# Patient Record
Sex: FEMALE | Race: WHITE | NOT HISPANIC OR LATINO | Employment: FULL TIME | URBAN - METROPOLITAN AREA
[De-identification: names, ages, dates, MRNs, and addresses within clinical notes are randomized per-mention and may not be internally consistent; named-entity substitution may affect disease eponyms.]

---

## 2019-10-23 LAB
EXTERNAL CHLAMYDIA RESULT: NEGATIVE
EXTERNAL HIV SCREEN: NORMAL
HCV AB SER-ACNC: <0.1
N GONORRHOEA RRNA SPEC QL PROBE: NEGATIVE

## 2020-06-30 ENCOUNTER — APPOINTMENT (OUTPATIENT)
Dept: LAB | Facility: CLINIC | Age: 45
End: 2020-06-30
Payer: COMMERCIAL

## 2020-06-30 ENCOUNTER — OFFICE VISIT (OUTPATIENT)
Dept: FAMILY MEDICINE CLINIC | Facility: CLINIC | Age: 45
End: 2020-06-30
Payer: COMMERCIAL

## 2020-06-30 VITALS
WEIGHT: 185 LBS | OXYGEN SATURATION: 99 % | SYSTOLIC BLOOD PRESSURE: 102 MMHG | DIASTOLIC BLOOD PRESSURE: 72 MMHG | TEMPERATURE: 97.9 F | BODY MASS INDEX: 32.78 KG/M2 | HEART RATE: 49 BPM | HEIGHT: 63 IN

## 2020-06-30 DIAGNOSIS — E03.9 HYPOTHYROIDISM, UNSPECIFIED TYPE: ICD-10-CM

## 2020-06-30 DIAGNOSIS — I42.9 CARDIOMYOPATHY, UNSPECIFIED TYPE (HCC): ICD-10-CM

## 2020-06-30 DIAGNOSIS — J45.20 MILD INTERMITTENT ASTHMA, UNSPECIFIED WHETHER COMPLICATED: ICD-10-CM

## 2020-06-30 DIAGNOSIS — D50.9 IRON DEFICIENCY ANEMIA, UNSPECIFIED IRON DEFICIENCY ANEMIA TYPE: ICD-10-CM

## 2020-06-30 DIAGNOSIS — Z00.00 PREVENTATIVE HEALTH CARE: Primary | ICD-10-CM

## 2020-06-30 DIAGNOSIS — T78.40XA ALLERGIC STATE, INITIAL ENCOUNTER: ICD-10-CM

## 2020-06-30 DIAGNOSIS — Z79.899 HIGH RISK MEDICATION USE: ICD-10-CM

## 2020-06-30 DIAGNOSIS — Z12.31 VISIT FOR SCREENING MAMMOGRAM: ICD-10-CM

## 2020-06-30 DIAGNOSIS — Z13.220 SCREENING, LIPID: ICD-10-CM

## 2020-06-30 DIAGNOSIS — Z00.00 ANNUAL PHYSICAL EXAM: ICD-10-CM

## 2020-06-30 LAB
ALBUMIN SERPL BCP-MCNC: 3.5 G/DL (ref 3.5–5)
ALP SERPL-CCNC: 95 U/L (ref 46–116)
ALT SERPL W P-5'-P-CCNC: 27 U/L (ref 12–78)
ANION GAP SERPL CALCULATED.3IONS-SCNC: 8 MMOL/L (ref 4–13)
AST SERPL W P-5'-P-CCNC: 20 U/L (ref 5–45)
BASOPHILS # BLD AUTO: 0.04 THOUSANDS/ΜL (ref 0–0.1)
BASOPHILS NFR BLD AUTO: 1 % (ref 0–1)
BILIRUB SERPL-MCNC: 0.74 MG/DL (ref 0.2–1)
BUN SERPL-MCNC: 11 MG/DL (ref 5–25)
CALCIUM SERPL-MCNC: 9.1 MG/DL (ref 8.3–10.1)
CHLORIDE SERPL-SCNC: 106 MMOL/L (ref 100–108)
CHOLEST SERPL-MCNC: 155 MG/DL (ref 50–200)
CO2 SERPL-SCNC: 24 MMOL/L (ref 21–32)
CREAT SERPL-MCNC: 0.81 MG/DL (ref 0.6–1.3)
EOSINOPHIL # BLD AUTO: 0.3 THOUSAND/ΜL (ref 0–0.61)
EOSINOPHIL NFR BLD AUTO: 5 % (ref 0–6)
ERYTHROCYTE [DISTWIDTH] IN BLOOD BY AUTOMATED COUNT: 12.9 % (ref 11.6–15.1)
GFR SERPL CREATININE-BSD FRML MDRD: 87 ML/MIN/1.73SQ M
GLUCOSE P FAST SERPL-MCNC: 90 MG/DL (ref 65–99)
HCT VFR BLD AUTO: 40.5 % (ref 34.8–46.1)
HDLC SERPL-MCNC: 52 MG/DL
HGB BLD-MCNC: 12.7 G/DL (ref 11.5–15.4)
IMM GRANULOCYTES # BLD AUTO: 0.01 THOUSAND/UL (ref 0–0.2)
IMM GRANULOCYTES NFR BLD AUTO: 0 % (ref 0–2)
LDLC SERPL CALC-MCNC: 90 MG/DL (ref 0–100)
LYMPHOCYTES # BLD AUTO: 2.59 THOUSANDS/ΜL (ref 0.6–4.47)
LYMPHOCYTES NFR BLD AUTO: 41 % (ref 14–44)
MCH RBC QN AUTO: 29.5 PG (ref 26.8–34.3)
MCHC RBC AUTO-ENTMCNC: 31.4 G/DL (ref 31.4–37.4)
MCV RBC AUTO: 94 FL (ref 82–98)
MONOCYTES # BLD AUTO: 0.64 THOUSAND/ΜL (ref 0.17–1.22)
MONOCYTES NFR BLD AUTO: 10 % (ref 4–12)
NEUTROPHILS # BLD AUTO: 2.82 THOUSANDS/ΜL (ref 1.85–7.62)
NEUTS SEG NFR BLD AUTO: 43 % (ref 43–75)
NONHDLC SERPL-MCNC: 103 MG/DL
NRBC BLD AUTO-RTO: 0 /100 WBCS
PLATELET # BLD AUTO: 205 THOUSANDS/UL (ref 149–390)
PMV BLD AUTO: 12.7 FL (ref 8.9–12.7)
POTASSIUM SERPL-SCNC: 4.2 MMOL/L (ref 3.5–5.3)
PROT SERPL-MCNC: 7.5 G/DL (ref 6.4–8.2)
RBC # BLD AUTO: 4.31 MILLION/UL (ref 3.81–5.12)
SODIUM SERPL-SCNC: 138 MMOL/L (ref 136–145)
TRIGL SERPL-MCNC: 67 MG/DL
TSH SERPL DL<=0.05 MIU/L-ACNC: 2.33 UIU/ML (ref 0.36–3.74)
WBC # BLD AUTO: 6.4 THOUSAND/UL (ref 4.31–10.16)

## 2020-06-30 PROCEDURE — 84443 ASSAY THYROID STIM HORMONE: CPT

## 2020-06-30 PROCEDURE — 80053 COMPREHEN METABOLIC PANEL: CPT

## 2020-06-30 PROCEDURE — 36415 COLL VENOUS BLD VENIPUNCTURE: CPT

## 2020-06-30 PROCEDURE — 99386 PREV VISIT NEW AGE 40-64: CPT | Performed by: FAMILY MEDICINE

## 2020-06-30 PROCEDURE — 80061 LIPID PANEL: CPT

## 2020-06-30 PROCEDURE — 85025 COMPLETE CBC W/AUTO DIFF WBC: CPT

## 2020-06-30 RX ORDER — ALBUTEROL SULFATE 90 UG/1
2 AEROSOL, METERED RESPIRATORY (INHALATION) 4 TIMES DAILY PRN
COMMUNITY
Start: 2007-01-08 | End: 2021-12-21 | Stop reason: SDUPTHER

## 2020-06-30 RX ORDER — LORAZEPAM 1 MG/1
TABLET ORAL
COMMUNITY
Start: 2020-06-09 | End: 2022-03-18 | Stop reason: SDUPTHER

## 2020-06-30 RX ORDER — MONTELUKAST SODIUM 10 MG/1
TABLET ORAL DAILY
COMMUNITY
Start: 2007-03-21 | End: 2020-07-16 | Stop reason: SDUPTHER

## 2020-06-30 RX ORDER — LISINOPRIL 10 MG/1
10 TABLET ORAL DAILY
COMMUNITY

## 2020-06-30 RX ORDER — LIOTHYRONINE SODIUM 5 UG/1
5 TABLET ORAL DAILY
COMMUNITY
End: 2020-07-16 | Stop reason: SDUPTHER

## 2020-06-30 RX ORDER — LANOLIN ALCOHOL/MO/W.PET/CERES
CREAM (GRAM) TOPICAL DAILY
COMMUNITY

## 2020-06-30 RX ORDER — BUSPIRONE HYDROCHLORIDE 10 MG/1
10 TABLET ORAL 3 TIMES DAILY
COMMUNITY
End: 2020-07-16 | Stop reason: SDUPTHER

## 2020-06-30 RX ORDER — ESCITALOPRAM OXALATE 20 MG/1
20 TABLET ORAL DAILY
COMMUNITY
End: 2020-07-16 | Stop reason: SDUPTHER

## 2020-06-30 RX ORDER — LEVOTHYROXINE SODIUM 175 UG/1
175 TABLET ORAL DAILY
COMMUNITY
End: 2020-07-16 | Stop reason: SDUPTHER

## 2020-06-30 RX ORDER — LORATADINE 10 MG/1
10 TABLET ORAL DAILY
COMMUNITY
End: 2020-07-16 | Stop reason: SDUPTHER

## 2020-07-16 ENCOUNTER — OFFICE VISIT (OUTPATIENT)
Dept: FAMILY MEDICINE CLINIC | Facility: CLINIC | Age: 45
End: 2020-07-16
Payer: COMMERCIAL

## 2020-07-16 VITALS
HEART RATE: 55 BPM | HEIGHT: 64 IN | SYSTOLIC BLOOD PRESSURE: 120 MMHG | BODY MASS INDEX: 31.58 KG/M2 | TEMPERATURE: 98.4 F | OXYGEN SATURATION: 95 % | DIASTOLIC BLOOD PRESSURE: 78 MMHG | WEIGHT: 185 LBS

## 2020-07-16 DIAGNOSIS — F41.9 ANXIETY: ICD-10-CM

## 2020-07-16 DIAGNOSIS — Z00.00 PREVENTATIVE HEALTH CARE: ICD-10-CM

## 2020-07-16 DIAGNOSIS — D50.9 IRON DEFICIENCY ANEMIA, UNSPECIFIED IRON DEFICIENCY ANEMIA TYPE: ICD-10-CM

## 2020-07-16 DIAGNOSIS — I10 ESSENTIAL HYPERTENSION: ICD-10-CM

## 2020-07-16 DIAGNOSIS — E03.9 HYPOTHYROIDISM, UNSPECIFIED TYPE: Primary | ICD-10-CM

## 2020-07-16 DIAGNOSIS — I42.9 CARDIOMYOPATHY, UNSPECIFIED TYPE (HCC): ICD-10-CM

## 2020-07-16 DIAGNOSIS — T78.40XA ALLERGIC STATE, INITIAL ENCOUNTER: ICD-10-CM

## 2020-07-16 PROBLEM — A08.4 VIRAL GASTROENTERITIS: Status: ACTIVE | Noted: 2020-07-16

## 2020-07-16 PROCEDURE — 3074F SYST BP LT 130 MM HG: CPT | Performed by: FAMILY MEDICINE

## 2020-07-16 PROCEDURE — 99215 OFFICE O/P EST HI 40 MIN: CPT | Performed by: FAMILY MEDICINE

## 2020-07-16 PROCEDURE — 1036F TOBACCO NON-USER: CPT | Performed by: FAMILY MEDICINE

## 2020-07-16 PROCEDURE — 3078F DIAST BP <80 MM HG: CPT | Performed by: FAMILY MEDICINE

## 2020-07-16 PROCEDURE — 3008F BODY MASS INDEX DOCD: CPT | Performed by: FAMILY MEDICINE

## 2020-07-16 RX ORDER — LORATADINE 10 MG/1
10 TABLET ORAL DAILY
Qty: 90 TABLET | Refills: 1 | Status: SHIPPED | OUTPATIENT
Start: 2020-07-16 | End: 2022-03-18 | Stop reason: SDUPTHER

## 2020-07-16 RX ORDER — BUSPIRONE HYDROCHLORIDE 10 MG/1
10 TABLET ORAL 2 TIMES DAILY
Qty: 180 TABLET | Refills: 0 | Status: SHIPPED | OUTPATIENT
Start: 2020-07-16 | End: 2021-12-21

## 2020-07-16 RX ORDER — ESCITALOPRAM OXALATE 20 MG/1
20 TABLET ORAL DAILY
Qty: 90 TABLET | Refills: 0 | Status: SHIPPED | OUTPATIENT
Start: 2020-07-16 | End: 2021-12-21

## 2020-07-16 RX ORDER — MONTELUKAST SODIUM 10 MG/1
10 TABLET ORAL DAILY
Qty: 90 TABLET | Refills: 1 | Status: SHIPPED | OUTPATIENT
Start: 2020-07-16 | End: 2022-03-18 | Stop reason: SDUPTHER

## 2020-07-16 RX ORDER — LEVOTHYROXINE SODIUM 175 UG/1
175 TABLET ORAL DAILY
Qty: 90 TABLET | Refills: 1 | Status: SHIPPED | OUTPATIENT
Start: 2020-07-16 | End: 2022-03-18 | Stop reason: SDUPTHER

## 2020-07-16 RX ORDER — LIOTHYRONINE SODIUM 5 UG/1
5 TABLET ORAL DAILY
Qty: 90 TABLET | Refills: 1 | Status: SHIPPED | OUTPATIENT
Start: 2020-07-16 | End: 2022-03-18 | Stop reason: SDUPTHER

## 2020-07-16 NOTE — PROGRESS NOTES
Subjective:      Patient ID: Bladimir Wheat is a 53 y o  female  Thyroid Problem   Presents for follow-up visit  Patient reports no anxiety, depressed mood, fatigue, menstrual problem, palpitations or tremors  The symptoms have been stable (Levothyroxine 175 micrograms, Cytomel 5 micrograms)  Hypertension   This is a chronic problem  The current episode started more than 1 year ago  The problem is controlled  Associated symptoms include anxiety  Pertinent negatives include no chest pain, headaches, palpitations, peripheral edema or shortness of breath  Risk factors: Cardiomyopathy  Treatments tried: Lisinopril 10 milligram, metoprolol 12 5 milligram b i d  The current treatment provides significant improvement  There are no compliance problems  Identifiable causes of hypertension include a thyroid problem  Anxiety   Presents for follow-up visit  Patient reports no chest pain, decreased concentration, depressed mood, excessive worry, insomnia, irritability, nervous/anxious behavior, palpitations, restlessness, shortness of breath or suicidal ideas  Panic: Improving  Symptoms occur occasionally  The severity of symptoms is causing significant distress  Compliance with medications: Lexapro 20 milligram, BuSpar 10 milligram b i d  Treatment side effects: None  Patient has history of cardiomyopathy, follows up with cardiologist   Brad Church for seasonal allergies  Requesting medication refill  Has history of intermittent uncomplicated asthma  Uses albuterol only as needed    Past Medical History:   Diagnosis Date    Allergic     Anxiety     Asthma     Cardiomyopathy ([address] )     Depression     Disease of thyroid gland     Interstitial cystitis        Family History   Problem Relation Age of Onset    Asthma Father     Arthritis Father     Cancer Mother     Cervical cancer Mother     Arthritis Mother        Past Surgical History:   Procedure Laterality Date    BARIATRIC SURGERY       SECTION      EAR RECONSTRUCTION      HYSTERECTOMY          reports that she has never smoked  She has never used smokeless tobacco  She reports that she drinks alcohol  She reports that she has current or past drug history  Drug: Marijuana  Current Outpatient Medications:     albuterol (Proventil HFA) 90 mcg/act inhaler, Inhale 2 puffs 4 (four) times a day as needed, Disp: , Rfl:     busPIRone (BUSPAR) 10 mg tablet, Take 1 tablet (10 mg total) by mouth 2 (two) times a day, Disp: 180 tablet, Rfl: 0    escitalopram (LEXAPRO) 20 mg tablet, Take 1 tablet (20 mg total) by mouth daily, Disp: 90 tablet, Rfl: 0    levothyroxine 175 mcg tablet, Take 1 tablet (175 mcg total) by mouth daily Needs to be name brand, Disp: 90 tablet, Rfl: 1    liothyronine (CYTOMEL) 5 mcg tablet, Take 1 tablet (5 mcg total) by mouth daily, Disp: 90 tablet, Rfl: 1    lisinopril (ZESTRIL) 10 mg tablet, Take 10 mg by mouth daily, Disp: , Rfl:     loratadine (CLARITIN) 10 mg tablet, Take 1 tablet (10 mg total) by mouth daily, Disp: 90 tablet, Rfl: 1    LORazepam (ATIVAN) 1 mg tablet, TK 1 T PO QD PRA, Disp: , Rfl:     metoprolol tartrate (LOPRESSOR) 25 mg tablet, Take 0 5 tablets (12 5 mg total) by mouth every 12 (twelve) hours, Disp: 30 tablet, Rfl: 0    montelukast (Singulair) 10 mg tablet, Take 1 tablet (10 mg total) by mouth daily, Disp: 90 tablet, Rfl: 1    vitamin B-12 (VITAMIN B-12) 1,000 mcg tablet, Take by mouth daily, Disp: , Rfl:     The following portions of the patient's history were reviewed and updated as appropriate: allergies, current medications, past family history, past medical history, past social history, past surgical history and problem list     Review of Systems   Constitutional: Negative  Negative for fatigue and irritability  Eyes: Negative  Respiratory: Negative  Negative for shortness of breath  Cardiovascular: Negative  Negative for chest pain and palpitations     Gastrointestinal: Negative  Endocrine: Negative  Genitourinary: Negative  Negative for menstrual problem  Musculoskeletal: Negative  Negative for myalgias  Neurological: Negative  Negative for tremors and headaches  Psychiatric/Behavioral: Negative  Negative for decreased concentration and suicidal ideas  The patient is not nervous/anxious and does not have insomnia  Objective:    /78   Pulse 55   Temp 98 4 °F (36 9 °C)   Ht 5' 4" (1 626 m)   Wt 83 9 kg (185 lb)   SpO2 95%   BMI 31 76 kg/m²      Physical Exam   Constitutional: She is oriented to person, place, and time  She appears well-developed and well-nourished  Cardiovascular: Normal rate, regular rhythm and normal heart sounds  Pulmonary/Chest: Effort normal and breath sounds normal    Abdominal: Soft  Bowel sounds are normal    Neurological: She is alert and oriented to person, place, and time     Psychiatric: Her behavior is normal  Judgment normal          Recent Results (from the past 1008 hour(s))   Comprehensive metabolic panel    Collection Time: 06/30/20 10:01 AM   Result Value Ref Range    Sodium 138 136 - 145 mmol/L    Potassium 4 2 3 5 - 5 3 mmol/L    Chloride 106 100 - 108 mmol/L    CO2 24 21 - 32 mmol/L    ANION GAP 8 4 - 13 mmol/L    BUN 11 5 - 25 mg/dL    Creatinine 0 81 0 60 - 1 30 mg/dL    Glucose, Fasting 90 65 - 99 mg/dL    Calcium 9 1 8 3 - 10 1 mg/dL    AST 20 5 - 45 U/L    ALT 27 12 - 78 U/L    Alkaline Phosphatase 95 46 - 116 U/L    Total Protein 7 5 6 4 - 8 2 g/dL    Albumin 3 5 3 5 - 5 0 g/dL    Total Bilirubin 0 74 0 20 - 1 00 mg/dL    eGFR 87 ml/min/1 73sq m   Lipid panel    Collection Time: 06/30/20 10:01 AM   Result Value Ref Range    Cholesterol 155 50 - 200 mg/dL    Triglycerides 67 <=150 mg/dL    HDL, Direct 52 >=40 mg/dL    LDL Calculated 90 0 - 100 mg/dL    Non-HDL-Chol (CHOL-HDL) 103 mg/dl   TSH, 3rd generation with Free T4 reflex    Collection Time: 06/30/20 10:01 AM   Result Value Ref Range    TSH 3RD GENERATON 2 330 0 358 - 3 740 uIU/mL   CBC and differential    Collection Time: 06/30/20 10:01 AM   Result Value Ref Range    WBC 6 40 4 31 - 10 16 Thousand/uL    RBC 4 31 3 81 - 5 12 Million/uL    Hemoglobin 12 7 11 5 - 15 4 g/dL    Hematocrit 40 5 34 8 - 46 1 %    MCV 94 82 - 98 fL    MCH 29 5 26 8 - 34 3 pg    MCHC 31 4 31 4 - 37 4 g/dL    RDW 12 9 11 6 - 15 1 %    MPV 12 7 8 9 - 12 7 fL    Platelets 065 457 - 986 Thousands/uL    nRBC 0 /100 WBCs    Neutrophils Relative 43 43 - 75 %    Immat GRANS % 0 0 - 2 %    Lymphocytes Relative 41 14 - 44 %    Monocytes Relative 10 4 - 12 %    Eosinophils Relative 5 0 - 6 %    Basophils Relative 1 0 - 1 %    Neutrophils Absolute 2 82 1 85 - 7 62 Thousands/µL    Immature Grans Absolute 0 01 0 00 - 0 20 Thousand/uL    Lymphocytes Absolute 2 59 0 60 - 4 47 Thousands/µL    Monocytes Absolute 0 64 0 17 - 1 22 Thousand/µL    Eosinophils Absolute 0 30 0 00 - 0 61 Thousand/µL    Basophils Absolute 0 04 0 00 - 0 10 Thousands/µL       Assessment/Plan:         Problem List Items Addressed This Visit        Endocrine    Hypothyroidism - Primary     Stable on levothyroxine 175 micrograms  On Cytomel 5  Micrograms, which was added to help with persisting fatigue  Patient responded well to Cytomel  Continue same  Relevant Medications    levothyroxine 175 mcg tablet    liothyronine (CYTOMEL) 5 mcg tablet    metoprolol tartrate (LOPRESSOR) 25 mg tablet    Other Relevant Orders    TSH, 3rd generation with Free T4 reflex       Cardiovascular and Mediastinum    Cardiomyopathy ([facility] [address] )     Continue monitoring/management per Cardiology  Relevant Medications    metoprolol tartrate (LOPRESSOR) 25 mg tablet    Essential hypertension     Stable on lisinopril 10 milligram, metoprolol 12 5 milligram b i d   Continue same           Relevant Medications    metoprolol tartrate (LOPRESSOR) 25 mg tablet    Other Relevant Orders    Microalbumin / creatinine urine ratio       Other Allergies     Continue Singulair 10 milligram          Relevant Medications    montelukast (Singulair) 10 mg tablet    loratadine (CLARITIN) 10 mg tablet    Iron deficiency anemia     Hemoglobin stable  Continue monitoring with labs  Relevant Orders    CBC and differential    Anxiety     Stable on BuSpar 10 milligram b i d , Lexapro 20 milligram   Patient takes Ativan and infrequently only as needed  Does not need a refill yet  Will call back for medication refill  Relevant Medications    busPIRone (BUSPAR) 10 mg tablet    escitalopram (LEXAPRO) 20 mg tablet    Other Relevant Orders    Comprehensive metabolic panel      Other Visit Diagnoses     Preventative health care        Relevant Orders    Ambulatory referral to Gynecology        I have spent 40 minutes with Patient and family today in which greater than 50% of this time was spent in counseling/coordination of care regarding Diagnostic results, Prognosis, Risks and benefits of tx options, Intructions for management, Patient and family education, Importance of tx compliance, Risk factor reductions and Impressions

## 2020-07-17 NOTE — ASSESSMENT & PLAN NOTE
Stable on BuSpar 10 milligram b i d , Lexapro 20 milligram   Patient takes Ativan and infrequently only as needed  Does not need a refill yet  Will call back for medication refill

## 2020-07-17 NOTE — ASSESSMENT & PLAN NOTE
Stable on levothyroxine 175 micrograms  On Cytomel 5  Micrograms, which was added to help with persisting fatigue  Patient responded well to Cytomel  Continue same

## 2021-12-21 ENCOUNTER — OFFICE VISIT (OUTPATIENT)
Dept: FAMILY MEDICINE CLINIC | Facility: CLINIC | Age: 46
End: 2021-12-21
Payer: COMMERCIAL

## 2021-12-21 VITALS
BODY MASS INDEX: 29.23 KG/M2 | HEIGHT: 63 IN | TEMPERATURE: 97 F | SYSTOLIC BLOOD PRESSURE: 110 MMHG | WEIGHT: 165 LBS | RESPIRATION RATE: 18 BRPM | DIASTOLIC BLOOD PRESSURE: 60 MMHG | OXYGEN SATURATION: 99 % | HEART RATE: 61 BPM

## 2021-12-21 DIAGNOSIS — I10 ESSENTIAL HYPERTENSION: Primary | ICD-10-CM

## 2021-12-21 DIAGNOSIS — M19.90 ARTHRITIS: ICD-10-CM

## 2021-12-21 DIAGNOSIS — Z98.84 S/P BARIATRIC SURGERY: ICD-10-CM

## 2021-12-21 DIAGNOSIS — Z13.6 SCREENING FOR CARDIOVASCULAR CONDITION: ICD-10-CM

## 2021-12-21 DIAGNOSIS — J45.20 MILD INTERMITTENT ASTHMA, UNSPECIFIED WHETHER COMPLICATED: ICD-10-CM

## 2021-12-21 DIAGNOSIS — F41.9 ANXIETY: ICD-10-CM

## 2021-12-21 DIAGNOSIS — D50.9 IRON DEFICIENCY ANEMIA, UNSPECIFIED IRON DEFICIENCY ANEMIA TYPE: ICD-10-CM

## 2021-12-21 DIAGNOSIS — E03.9 HYPOTHYROIDISM, UNSPECIFIED TYPE: ICD-10-CM

## 2021-12-21 PROBLEM — A08.4 VIRAL GASTROENTERITIS: Status: RESOLVED | Noted: 2020-07-16 | Resolved: 2021-12-21

## 2021-12-21 PROCEDURE — 3725F SCREEN DEPRESSION PERFORMED: CPT | Performed by: FAMILY MEDICINE

## 2021-12-21 PROCEDURE — 3008F BODY MASS INDEX DOCD: CPT | Performed by: FAMILY MEDICINE

## 2021-12-21 PROCEDURE — 99204 OFFICE O/P NEW MOD 45 MIN: CPT | Performed by: FAMILY MEDICINE

## 2021-12-21 PROCEDURE — 1036F TOBACCO NON-USER: CPT | Performed by: FAMILY MEDICINE

## 2021-12-21 RX ORDER — TRAZODONE HYDROCHLORIDE 50 MG/1
TABLET ORAL DAILY
COMMUNITY
Start: 2021-12-16 | End: 2022-03-18 | Stop reason: SDUPTHER

## 2021-12-21 RX ORDER — PAROXETINE 30 MG/1
TABLET, FILM COATED ORAL DAILY
COMMUNITY
Start: 2021-12-15 | End: 2022-03-18 | Stop reason: SDUPTHER

## 2021-12-21 RX ORDER — ALBUTEROL SULFATE 90 UG/1
2 AEROSOL, METERED RESPIRATORY (INHALATION) 4 TIMES DAILY PRN
Qty: 1 G | Refills: 3 | Status: SHIPPED | OUTPATIENT
Start: 2021-12-21

## 2021-12-23 ENCOUNTER — TELEPHONE (OUTPATIENT)
Dept: ADMINISTRATIVE | Facility: OTHER | Age: 46
End: 2021-12-23

## 2021-12-23 NOTE — LETTER
Procedure Request Form: Mammogram      Date Requested: 21  Patient: Giorgi Carty  Patient : 1975   Referring Provider: Abby Pettit, MD        Date of Procedure ______________________________       The above patient has informed us that they have completed their   most recent Mammogram at your facility  Please complete   this form and attach all corresponding procedure reports/results  Comments __________________________________________________________  ____________________________________________________________________  ____________________________________________________________________  ____________________________________________________________________    Facility Completing Procedure _________________________________________    Form Completed By (print name) _______________________________________      Signature __________________________________________________________      These reports are needed for  compliance  Please fax this completed form and a copy of the procedure report to our office located at [place] [address] as soon as possible to [phone] attention Shayy Lindsay: Phone [phone]    We thank you for your assistance in treating our mutual patient

## 2021-12-23 NOTE — TELEPHONE ENCOUNTER
----- Message from Alexis Ascencio DO sent at 12/21/2021  1:21 PM EST -----  Regarding: Terryl Abe Dr Claudeen Fitz - ordered ammo we need for chart - old pcp in [place]

## 2021-12-28 NOTE — TELEPHONE ENCOUNTER
Upon review of the In Basket request and the patient's chart, initial outreach has been made via fax, please see Contacts section for details       Thank you  Bri Hernandez MA

## 2022-01-03 NOTE — TELEPHONE ENCOUNTER
Upon review of the In Basket request we were able to locate, review, and update the patient chart as requested for Mammogram     Any additional questions or concerns should be emailed to the Practice Liaisons via [email or web address], please do not reply via In Basket      Thank you  Helen Wyatt MA

## 2022-01-20 ENCOUNTER — DOCUMENTATION (OUTPATIENT)
Dept: AUDIOLOGY | Facility: CLINIC | Age: 47
End: 2022-01-20

## 2022-01-20 DIAGNOSIS — H90.3 SENSORY HEARING LOSS, BILATERAL: Primary | ICD-10-CM

## 2022-01-20 NOTE — PROGRESS NOTES
Progress Note    Name:  Bladimir Wheat  :  1975  Age:  48 y o    Date of Evaluation: 22     Scanned copy of audio from [facility]      Netta Gonzalez, Evonne , [address] #[id number]

## 2022-02-08 ENCOUNTER — OFFICE VISIT (OUTPATIENT)
Dept: AUDIOLOGY | Facility: CLINIC | Age: 47
End: 2022-02-08
Payer: COMMERCIAL

## 2022-02-08 DIAGNOSIS — H90.3 SENSORY HEARING LOSS, BILATERAL: Primary | ICD-10-CM

## 2022-02-08 PROCEDURE — 92557 COMPREHENSIVE HEARING TEST: CPT | Performed by: AUDIOLOGIST

## 2022-02-08 PROCEDURE — 92567 TYMPANOMETRY: CPT | Performed by: AUDIOLOGIST

## 2022-02-08 NOTE — PROGRESS NOTES
HEARING EVALUATION    Name:  Elissa Pace  :  1975  Age:  48 y o  Date of Evaluation: 22      History: Known Hearing Loss binaurally  Reason for visit: Elissa Pace was seen today at the request of Dr Andreina Kelley for an evaluation of hearing  Michell Garcia reported constant tinnitus in both ears, no otalgia, and no dizziness  Michell Garcia has a family history of hearing loss and her sister wears binaural hearing aids  Michell Garcia is having trouble hearing and understanding especially in noisy environments  EVALUATION:    Otoscopic Evaluation:   Right Ear: Clear and healthy ear canal and tympanic membrane   Left Ear: Clear and healthy ear canal and tympanic membrane, small scab in bottom portion of ear canal    Tympanometry:   Right Ear: Type A - normal middle ear pressure and compliance   Left Ear: Type A - normal middle ear pressure and compliance    Audiogram Results:  Pure tone testing revealed a mild sloping to moderately severe sensorineural hearing loss bilaterally  Speech Reception Thresholds (SRT) were obhtained at 40 dB HL for the right ear and 35 dB HL for the left ear  Testing was consistent and reliable  Word recognition scores were excellent bilaterally  This degree of hearing loss will interfere with Sweta's ability to hear and understand conversational speech especially in noisy environments or when the speaker is at a distance  Michell Garcia is an excellent candidate for hearing aids  *see attached audiogram      RECOMMENDATIONS:  Hearing Aid Evaluation for a trial with hearing aids  Return for yearly audiologic evaluations to monitor hearing sensitivity  PATIENT EDUCATION:   These results and recommendations were discussed with Michell Garcia  Questions were addressed and the patient was encouraged to contact our department should concerns arise  Michell Garcia was very interested in obtaining hearing aids but will wait until she is more financially able   I gave Michell Garcia reading material about living with tinnitus and living with hearing loss         Betty Bolton, AuD , [address] [facility] #[id number]

## 2022-03-18 DIAGNOSIS — E03.9 HYPOTHYROIDISM, UNSPECIFIED TYPE: ICD-10-CM

## 2022-03-18 DIAGNOSIS — T78.40XA ALLERGY, INITIAL ENCOUNTER: ICD-10-CM

## 2022-03-18 DIAGNOSIS — I42.9 CARDIOMYOPATHY, UNSPECIFIED TYPE (HCC): ICD-10-CM

## 2022-03-18 DIAGNOSIS — F41.9 ANXIETY: Primary | ICD-10-CM

## 2022-03-18 RX ORDER — TRAZODONE HYDROCHLORIDE 50 MG/1
50 TABLET ORAL DAILY
Qty: 30 TABLET | Refills: 0 | Status: SHIPPED | OUTPATIENT
Start: 2022-03-18 | End: 2022-04-28 | Stop reason: SDUPTHER

## 2022-03-18 RX ORDER — LORAZEPAM 1 MG/1
1 TABLET ORAL DAILY PRN
Qty: 30 TABLET | Refills: 0 | Status: SHIPPED | OUTPATIENT
Start: 2022-03-18 | End: 2022-06-20 | Stop reason: SDUPTHER

## 2022-03-18 RX ORDER — LEVOTHYROXINE SODIUM 175 UG/1
175 TABLET ORAL DAILY
Qty: 90 TABLET | Refills: 1 | Status: SHIPPED | OUTPATIENT
Start: 2022-03-18 | End: 2022-07-13 | Stop reason: SDUPTHER

## 2022-03-18 RX ORDER — LORATADINE 10 MG/1
10 TABLET ORAL DAILY
Qty: 90 TABLET | Refills: 1 | Status: SHIPPED | OUTPATIENT
Start: 2022-03-18 | End: 2022-07-13

## 2022-03-18 RX ORDER — PAROXETINE 30 MG/1
30 TABLET, FILM COATED ORAL DAILY
Qty: 90 TABLET | Refills: 1 | Status: SHIPPED | OUTPATIENT
Start: 2022-03-18

## 2022-03-18 RX ORDER — VITAMIN B COMPLEX
TABLET ORAL
COMMUNITY

## 2022-03-18 RX ORDER — MONTELUKAST SODIUM 10 MG/1
10 TABLET ORAL DAILY
Qty: 90 TABLET | Refills: 1 | Status: SHIPPED | OUTPATIENT
Start: 2022-03-18

## 2022-03-18 RX ORDER — LIOTHYRONINE SODIUM 5 UG/1
5 TABLET ORAL DAILY
Qty: 90 TABLET | Refills: 1 | Status: SHIPPED | OUTPATIENT
Start: 2022-03-18

## 2022-03-28 ENCOUNTER — APPOINTMENT (EMERGENCY)
Dept: RADIOLOGY | Facility: HOSPITAL | Age: 47
End: 2022-03-28
Payer: COMMERCIAL

## 2022-03-28 ENCOUNTER — HOSPITAL ENCOUNTER (EMERGENCY)
Facility: HOSPITAL | Age: 47
Discharge: HOME | End: 2022-03-28
Attending: EMERGENCY MEDICINE | Admitting: EMERGENCY MEDICINE
Payer: COMMERCIAL

## 2022-03-28 VITALS
SYSTOLIC BLOOD PRESSURE: 110 MMHG | BODY MASS INDEX: 29.23 KG/M2 | TEMPERATURE: 96.4 F | OXYGEN SATURATION: 98 % | DIASTOLIC BLOOD PRESSURE: 51 MMHG | HEIGHT: 63 IN | HEART RATE: 59 BPM | WEIGHT: 165 LBS | RESPIRATION RATE: 18 BRPM

## 2022-03-28 DIAGNOSIS — R10.13 EPIGASTRIC PAIN: Primary | ICD-10-CM

## 2022-03-28 LAB
ALBUMIN SERPL BCP-MCNC: 3.8 G/DL (ref 3.5–5)
ALP SERPL-CCNC: 66 U/L (ref 46–116)
ALT SERPL W P-5'-P-CCNC: 26 U/L (ref 12–78)
ANION GAP SERPL CALCULATED.3IONS-SCNC: 6 MMOL/L (ref 4–13)
AST SERPL W P-5'-P-CCNC: 24 U/L (ref 5–45)
ATRIAL RATE: 55 BPM
BASOPHILS # BLD AUTO: 0.04 THOUSANDS/ΜL (ref 0–0.1)
BASOPHILS NFR BLD AUTO: 1 % (ref 0–1)
BILIRUB DIRECT SERPL-MCNC: 0.25 MG/DL (ref 0–0.2)
BILIRUB SERPL-MCNC: 0.93 MG/DL (ref 0.2–1)
BUN SERPL-MCNC: 14 MG/DL (ref 5–25)
CALCIUM SERPL-MCNC: 8.2 MG/DL (ref 8.3–10.1)
CARDIAC TROPONIN I PNL SERPL HS: 3 NG/L
CHLORIDE SERPL-SCNC: 108 MMOL/L (ref 100–108)
CO2 SERPL-SCNC: 26 MMOL/L (ref 21–32)
CREAT SERPL-MCNC: 0.85 MG/DL (ref 0.6–1.3)
EOSINOPHIL # BLD AUTO: 0.25 THOUSAND/ΜL (ref 0–0.61)
EOSINOPHIL NFR BLD AUTO: 5 % (ref 0–6)
ERYTHROCYTE [DISTWIDTH] IN BLOOD BY AUTOMATED COUNT: 13.4 % (ref 11.6–15.1)
GFR SERPL CREATININE-BSD FRML MDRD: 81 ML/MIN/1.73SQ M
GLUCOSE SERPL-MCNC: 95 MG/DL (ref 65–140)
HCT VFR BLD AUTO: 37 % (ref 34.8–46.1)
HGB BLD-MCNC: 11.8 G/DL (ref 11.5–15.4)
IMM GRANULOCYTES # BLD AUTO: 0.01 THOUSAND/UL (ref 0–0.2)
IMM GRANULOCYTES NFR BLD AUTO: 0 % (ref 0–2)
LIPASE SERPL-CCNC: 55 U/L (ref 73–393)
LYMPHOCYTES # BLD AUTO: 1.77 THOUSANDS/ΜL (ref 0.6–4.47)
LYMPHOCYTES NFR BLD AUTO: 33 % (ref 14–44)
MCH RBC QN AUTO: 29.2 PG (ref 26.8–34.3)
MCHC RBC AUTO-ENTMCNC: 31.9 G/DL (ref 31.4–37.4)
MCV RBC AUTO: 92 FL (ref 82–98)
MONOCYTES # BLD AUTO: 0.72 THOUSAND/ΜL (ref 0.17–1.22)
MONOCYTES NFR BLD AUTO: 14 % (ref 4–12)
NEUTROPHILS # BLD AUTO: 2.54 THOUSANDS/ΜL (ref 1.85–7.62)
NEUTS SEG NFR BLD AUTO: 47 % (ref 43–75)
NRBC BLD AUTO-RTO: 0 /100 WBCS
P AXIS: 70 DEGREES
PLATELET # BLD AUTO: 191 THOUSANDS/UL (ref 149–390)
PMV BLD AUTO: 10.7 FL (ref 8.9–12.7)
POTASSIUM SERPL-SCNC: 3.3 MMOL/L (ref 3.5–5.3)
PR INTERVAL: 172 MS
PROT SERPL-MCNC: 7 G/DL (ref 6.4–8.2)
QRS AXIS: 54 DEGREES
QRSD INTERVAL: 90 MS
QT INTERVAL: 438 MS
QTC INTERVAL: 419 MS
RBC # BLD AUTO: 4.04 MILLION/UL (ref 3.81–5.12)
SODIUM SERPL-SCNC: 140 MMOL/L (ref 136–145)
T WAVE AXIS: -57 DEGREES
VENTRICULAR RATE: 55 BPM
WBC # BLD AUTO: 5.33 THOUSAND/UL (ref 4.31–10.16)

## 2022-03-28 PROCEDURE — 93005 ELECTROCARDIOGRAM TRACING: CPT

## 2022-03-28 PROCEDURE — 93010 ELECTROCARDIOGRAM REPORT: CPT | Performed by: INTERNAL MEDICINE

## 2022-03-28 PROCEDURE — 36415 COLL VENOUS BLD VENIPUNCTURE: CPT | Performed by: EMERGENCY MEDICINE

## 2022-03-28 PROCEDURE — 96375 TX/PRO/DX INJ NEW DRUG ADDON: CPT

## 2022-03-28 PROCEDURE — 80048 BASIC METABOLIC PNL TOTAL CA: CPT | Performed by: EMERGENCY MEDICINE

## 2022-03-28 PROCEDURE — 99285 EMERGENCY DEPT VISIT HI MDM: CPT | Performed by: EMERGENCY MEDICINE

## 2022-03-28 PROCEDURE — 80076 HEPATIC FUNCTION PANEL: CPT | Performed by: EMERGENCY MEDICINE

## 2022-03-28 PROCEDURE — 85025 COMPLETE CBC W/AUTO DIFF WBC: CPT | Performed by: EMERGENCY MEDICINE

## 2022-03-28 PROCEDURE — 76705 ECHO EXAM OF ABDOMEN: CPT

## 2022-03-28 PROCEDURE — 99284 EMERGENCY DEPT VISIT MOD MDM: CPT

## 2022-03-28 PROCEDURE — 84484 ASSAY OF TROPONIN QUANT: CPT | Performed by: EMERGENCY MEDICINE

## 2022-03-28 PROCEDURE — 96374 THER/PROPH/DIAG INJ IV PUSH: CPT

## 2022-03-28 PROCEDURE — 83690 ASSAY OF LIPASE: CPT | Performed by: EMERGENCY MEDICINE

## 2022-03-28 RX ORDER — KETOROLAC TROMETHAMINE 30 MG/ML
15 INJECTION, SOLUTION INTRAMUSCULAR; INTRAVENOUS ONCE
Status: COMPLETED | OUTPATIENT
Start: 2022-03-28 | End: 2022-03-28

## 2022-03-28 RX ORDER — FAMOTIDINE 20 MG/1
20 TABLET, FILM COATED ORAL 2 TIMES DAILY
Qty: 28 TABLET | Refills: 0 | Status: SHIPPED | OUTPATIENT
Start: 2022-03-28 | End: 2022-07-13

## 2022-03-28 RX ADMIN — FAMOTIDINE 20 MG: 10 INJECTION INTRAVENOUS at 09:57

## 2022-03-28 RX ADMIN — KETOROLAC TROMETHAMINE 15 MG: 30 INJECTION, SOLUTION INTRAMUSCULAR at 09:57

## 2022-03-28 NOTE — ED PROCEDURE NOTE
PROCEDURE  ECG 12 Lead Documentation Only    Date/Time: 3/28/2022 10:13 AM  Performed by: Angelica Tolbert DO  Authorized by: Angelica Tolbert DO     ECG reviewed by me, the ED Provider: yes    Patient location:  ED  Interpretation:     Interpretation: abnormal    Rate:     ECG rate:  55    ECG rate assessment: bradycardic    Rhythm:     Rhythm: sinus rhythm    ST segments:     ST segments:  Normal  T waves:     T waves: inverted      Inverted:  V3 and V4         Angelica Tolbert DO  03/28/22 1014

## 2022-03-29 NOTE — ED PROVIDER NOTES
History  Chief Complaint   Patient presents with    Abdominal Pain     RUQ pain for 2-3 weeks and pregressively getting worse     Patient presents for evaluation of abdominal pain for last 2-3 weeks  Patient states has been getting progressively worsened has been constant  Patient has not tried taking anything for the pain at home  Patient denies any modifying factors for symptoms  Pain is not made worse or better by food  Denies any nausea or vomiting  Denies any diarrhea or constipation  Pain sometimes radiates up into her chest       History provided by:  Patient   used: No    Abdominal Pain  Associated symptoms: chest pain    Associated symptoms: no chills, no constipation, no cough, no diarrhea, no dysuria, no fever, no hematuria, no nausea, no shortness of breath, no sore throat and no vomiting        Prior to Admission Medications   Prescriptions Last Dose Informant Patient Reported? Taking?    LORazepam (ATIVAN) 1 mg tablet   No No   Sig: Take 1 tablet (1 mg total) by mouth daily as needed for anxiety   PARoxetine (PAXIL) 30 mg tablet   No No   Sig: Take 1 tablet (30 mg total) by mouth in the morning   albuterol (Proventil HFA) 90 mcg/act inhaler   No No   Sig: Inhale 2 puffs 4 (four) times a day as needed for wheezing   cholecalciferol (VITAMIN D3) 25 mcg (1,000 units) tablet   Yes No   levothyroxine 175 mcg tablet   No No   Sig: Take 1 tablet (175 mcg total) by mouth daily Needs to be name brand   liothyronine (CYTOMEL) 5 mcg tablet   No No   Sig: Take 1 tablet (5 mcg total) by mouth daily   lisinopril (ZESTRIL) 10 mg tablet   Yes No   Sig: Take 10 mg by mouth daily   loratadine (CLARITIN) 10 mg tablet   No No   Sig: Take 1 tablet (10 mg total) by mouth daily   metoprolol tartrate (LOPRESSOR) 25 mg tablet   No No   Sig: Take 0 5 tablets (12 5 mg total) by mouth every 12 (twelve) hours   montelukast (Singulair) 10 mg tablet   No No   Sig: Take 1 tablet (10 mg total) by mouth daily traZODone (DESYREL) 50 mg tablet   No No   Sig: Take 1 tablet (50 mg total) by mouth daily   vitamin B-12 (VITAMIN B-12) 1,000 mcg tablet   Yes No   Sig: Take by mouth daily      Facility-Administered Medications: None       Past Medical History:   Diagnosis Date    Allergic     Anxiety     Asthma     Cardiomyopathy ([facility] [address] )     Depression     Disease of thyroid gland     Interstitial cystitis        Past Surgical History:   Procedure Laterality Date    BARIATRIC SURGERY       SECTION      EAR RECONSTRUCTION      HYSTERECTOMY         Family History   Problem Relation Age of Onset    Asthma Father     Arthritis Father     Cancer Mother     Cervical cancer Mother     Arthritis Mother      I have reviewed and agree with the history as documented  E-Cigarette/Vaping    E-Cigarette Use Never User      E-Cigarette/Vaping Substances    Nicotine No     THC No     CBD No     Flavoring No     Other No     Unknown No      Social History     Tobacco Use    Smoking status: Never Smoker    Smokeless tobacco: Never Used   Vaping Use    Vaping Use: Never used   Substance Use Topics    Alcohol use: Yes     Comment: social    Drug use: Yes     Types: Marijuana       Review of Systems   Constitutional: Negative for chills and fever  HENT: Negative for ear pain and sore throat  Eyes: Negative for pain and visual disturbance  Respiratory: Negative for cough and shortness of breath  Cardiovascular: Positive for chest pain  Negative for palpitations  Gastrointestinal: Positive for abdominal pain  Negative for blood in stool, constipation, diarrhea, nausea and vomiting  Genitourinary: Negative for dysuria and hematuria  Musculoskeletal: Negative for arthralgias and back pain  Skin: Negative for color change and rash  Neurological: Negative for seizures and syncope  All other systems reviewed and are negative  Physical Exam  Physical Exam  Vitals and nursing note reviewed  Constitutional:       General: She is not in acute distress  HENT:      Head: Atraumatic  Right Ear: External ear normal       Left Ear: External ear normal       Nose: Nose normal       Mouth/Throat:      Mouth: Mucous membranes are moist       Pharynx: Oropharynx is clear  Eyes:      General: No scleral icterus  Conjunctiva/sclera: Conjunctivae normal    Cardiovascular:      Rate and Rhythm: Normal rate and regular rhythm  Pulses: Normal pulses  Pulmonary:      Effort: Pulmonary effort is normal  No respiratory distress  Breath sounds: Normal breath sounds  Abdominal:      General: Abdomen is flat  Bowel sounds are normal  There is no distension  Palpations: Abdomen is soft  Tenderness: There is abdominal tenderness  There is no guarding or rebound  Comments: Tenderness in epigastric and right upper quadrant  Musculoskeletal:         General: No deformity  Normal range of motion  Skin:     Capillary Refill: Capillary refill takes less than 2 seconds  Findings: No rash  Neurological:      General: No focal deficit present  Mental Status: She is alert and oriented to person, place, and time           Vital Signs  ED Triage Vitals   Temperature Pulse Respirations Blood Pressure SpO2   03/28/22 0912 03/28/22 0912 03/28/22 0912 03/28/22 0912 03/28/22 0912   98 1 °F (36 7 °C) 73 18 127/60 99 %      Temp Source Heart Rate Source Patient Position - Orthostatic VS BP Location FiO2 (%)   03/28/22 1121 03/28/22 1121 03/28/22 1121 03/28/22 1121 --   Tympanic Monitor Lying Right arm       Pain Score       03/28/22 0912       7           Vitals:    03/28/22 0912 03/28/22 1121   BP: 127/60 110/51   Pulse: 73 59   Patient Position - Orthostatic VS:  Lying         Visual Acuity      ED Medications  Medications   ketorolac (TORADOL) injection 15 mg (15 mg Intravenous Given 3/28/22 0957)   famotidine (PEPCID) injection 20 mg (20 mg Intravenous Given 3/28/22 0957) Diagnostic Studies  Results Reviewed     Procedure Component Value Units Date/Time    HS Troponin 0hr (reflex protocol) [[id number]]  (Normal) Collected: 03/28/22 0927    Lab Status: Final result Specimen: Blood from Arm, Left Updated: 03/28/22 1053     hs TnI 0hr 3 ng/L     Hepatic function panel [[id number]]  (Abnormal) Collected: 03/28/22 0927    Lab Status: Final result Specimen: Blood from Arm, Left Updated: 03/28/22 0950     Total Bilirubin 0 93 mg/dL      Bilirubin, Direct 0 25 mg/dL      Alkaline Phosphatase 66 U/L      AST 24 U/L      ALT 26 U/L      Total Protein 7 0 g/dL      Albumin 3 8 g/dL     Lipase [[id number]]  (Abnormal) Collected: 03/28/22 0927    Lab Status: Final result Specimen: Blood from Arm, Left Updated: 03/28/22 0950     Lipase 55 u/L     Basic metabolic panel [[id number]]  (Abnormal) Collected: 03/28/22 0927    Lab Status: Final result Specimen: Blood from Arm, Left Updated: 03/28/22 0950     Sodium 140 mmol/L      Potassium 3 3 mmol/L      Chloride 108 mmol/L      CO2 26 mmol/L      ANION GAP 6 mmol/L      BUN 14 mg/dL      Creatinine 0 85 mg/dL      Glucose 95 mg/dL      Calcium 8 2 mg/dL      eGFR 81 ml/min/1 73sq m     Narrative:      Atif guidelines for Chronic Kidney Disease (CKD):     Stage 1 with normal or high GFR (GFR > 90 mL/min/1 73 square meters)    Stage 2 Mild CKD (GFR = 60-89 mL/min/1 73 square meters)    Stage 3A Moderate CKD (GFR = 45-59 mL/min/1 73 square meters)    Stage 3B Moderate CKD (GFR = 30-44 mL/min/1 73 square meters)    Stage 4 Severe CKD (GFR = 15-29 mL/min/1 73 square meters)    Stage 5 End Stage CKD (GFR <15 mL/min/1 73 square meters)  Note: GFR calculation is accurate only with a steady state creatinine    CBC and differential [[id number]]  (Abnormal) Collected: 03/28/22 0927    Lab Status: Final result Specimen: Blood from Arm, Left Updated: 03/28/22 0933     WBC 5 33 Thousand/uL      RBC 4 04 Million/uL Hemoglobin 11 8 g/dL      Hematocrit 37 0 %      MCV 92 fL      MCH 29 2 pg      MCHC 31 9 g/dL      RDW 13 4 %      MPV 10 7 fL      Platelets 166 Thousands/uL      nRBC 0 /100 WBCs      Neutrophils Relative 47 %      Immat GRANS % 0 %      Lymphocytes Relative 33 %      Monocytes Relative 14 %      Eosinophils Relative 5 %      Basophils Relative 1 %      Neutrophils Absolute 2 54 Thousands/µL      Immature Grans Absolute 0 01 Thousand/uL      Lymphocytes Absolute 1 77 Thousands/µL      Monocytes Absolute 0 72 Thousand/µL      Eosinophils Absolute 0 25 Thousand/µL      Basophils Absolute 0 04 Thousands/µL                  US right upper quadrant   Final Result by Dahiana Gaviria MD (03/28 1109)      Normal       Workstation performed: [id number]                    Procedures  Procedures         ED Course                               SBIRT 22yo+      Most Recent Value   SBIRT (23 yo +)    In order to provide better care to our patients, we are screening all of our patients for alcohol and drug use  Would it be okay to ask you these screening questions? No Filed at: 03/28/2022 5309                    [facility]  Number of Diagnoses or Management Options  Epigastric pain  Diagnosis management comments: Pulse ox 98% on room air indicating adequate oxygenation  On repeat exam patient reported feeling better  Lab work and ultrasound results discussed with the patient  Will start patient on Pepcid twice a day and have her follow-up with GI  Patient advised to return if symptoms worsen         Amount and/or Complexity of Data Reviewed  Clinical lab tests: ordered and reviewed  Tests in the radiology section of CPT®: ordered and reviewed  Decide to obtain previous medical records or to obtain history from someone other than the patient: yes  Review and summarize past medical records: yes    Patient Progress  Patient progress: improved      Disposition  Final diagnoses:   Epigastric pain     Time reflects when diagnosis was documented in both MDM as applicable and the Disposition within this note     Time User Action Codes Description Comment    3/28/2022 11:11 AM Rhiannon Stock Add [R10 13] Epigastric pain       ED Disposition     ED Disposition Condition Date/Time Comment    Discharge Stable Mon Mar 28, 2022 11:11 AM Chirag Nearing discharge to home/self care              Follow-up Information     Follow up With Specialties Details Why Contact Kemar García MD Gastroenterology In 1 week  [facility]  [phone]            Discharge Medication List as of 3/28/2022 11:12 AM      START taking these medications    Details   famotidine (PEPCID) 20 mg tablet Take 1 tablet (20 mg total) by mouth 2 (two) times a day for 14 days, Starting Mon 3/28/2022, Until Mon 4/11/2022, Normal         CONTINUE these medications which have NOT CHANGED    Details   albuterol (Proventil HFA) 90 mcg/act inhaler Inhale 2 puffs 4 (four) times a day as needed for wheezing, Starting Tue 12/21/2021, Normal      cholecalciferol (VITAMIN D3) 25 mcg (1,000 units) tablet Historical Med      levothyroxine 175 mcg tablet Take 1 tablet (175 mcg total) by mouth daily Needs to be name brand, Starting Fri 3/18/2022, Normal      liothyronine (CYTOMEL) 5 mcg tablet Take 1 tablet (5 mcg total) by mouth daily, Starting Fri 3/18/2022, Normal      lisinopril (ZESTRIL) 10 mg tablet Take 10 mg by mouth daily, Historical Med      loratadine (CLARITIN) 10 mg tablet Take 1 tablet (10 mg total) by mouth daily, Starting Fri 3/18/2022, Normal      LORazepam (ATIVAN) 1 mg tablet Take 1 tablet (1 mg total) by mouth daily as needed for anxiety, Starting Fri 3/18/2022, Normal      metoprolol tartrate (LOPRESSOR) 25 mg tablet Take 0 5 tablets (12 5 mg total) by mouth every 12 (twelve) hours, Starting Fri 3/18/2022, No Print      montelukast (Singulair) 10 mg tablet Take 1 tablet (10 mg total) by mouth daily, Starting Fri 3/18/2022, Normal      PARoxetine (PAXIL) 30 mg tablet Take 1 tablet (30 mg total) by mouth in the morning, Starting Fri 3/18/2022, Normal      traZODone (DESYREL) 50 mg tablet Take 1 tablet (50 mg total) by mouth daily, Starting Fri 3/18/2022, Normal      vitamin B-12 (VITAMIN B-12) 1,000 mcg tablet Take by mouth daily, Historical Med             No discharge procedures on file      PDMP Review     None          ED Provider  Electronically Signed by           Rosario Sánchez DO  03/29/22 0759

## 2022-04-05 ENCOUNTER — OFFICE VISIT (OUTPATIENT)
Dept: GASTROENTEROLOGY | Facility: CLINIC | Age: 47
End: 2022-04-05
Payer: COMMERCIAL

## 2022-04-05 VITALS
HEART RATE: 45 BPM | TEMPERATURE: 97.9 F | SYSTOLIC BLOOD PRESSURE: 108 MMHG | DIASTOLIC BLOOD PRESSURE: 70 MMHG | WEIGHT: 167 LBS | BODY MASS INDEX: 29.59 KG/M2 | OXYGEN SATURATION: 99 % | HEIGHT: 63 IN

## 2022-04-05 DIAGNOSIS — R10.13 EPIGASTRIC PAIN: Primary | ICD-10-CM

## 2022-04-05 DIAGNOSIS — Z12.11 COLON CANCER SCREENING: ICD-10-CM

## 2022-04-05 PROCEDURE — 1036F TOBACCO NON-USER: CPT | Performed by: PHYSICIAN ASSISTANT

## 2022-04-05 PROCEDURE — 3008F BODY MASS INDEX DOCD: CPT | Performed by: PHYSICIAN ASSISTANT

## 2022-04-05 PROCEDURE — 99213 OFFICE O/P EST LOW 20 MIN: CPT | Performed by: PHYSICIAN ASSISTANT

## 2022-04-05 RX ORDER — OMEPRAZOLE 40 MG/1
40 CAPSULE, DELAYED RELEASE ORAL DAILY
Qty: 30 CAPSULE | Refills: 2 | Status: SHIPPED | OUTPATIENT
Start: 2022-04-05

## 2022-04-05 NOTE — PATIENT INSTRUCTIONS
Scheduled date of colonoscopy/EGD (as of today): 05/04/22  Physician performing colonoscopy: Karol Abdul  Location of colonoscopy: [facility]  Bowel prep reviewed with patient: Marshall Gallego  Instructions reviewed with patient by: VIRGINIA  Clearances: COVID TEST 04/28/22

## 2022-04-05 NOTE — PROGRESS NOTES
[facility] - Outpatient Consultation  Emperatriz Mclean 48 y o  female MRN: [id number]  Encounter: [id number]          ASSESSMENT AND PLAN:      #1  Epigastric pain: for last several weeks, was seen in ED on 3/28, had normal US and labs, has hx gastric bypass, suspect PUD or anastomotic ulcer, some relief with pepcid if she takes 2 at a time  No significant NSAID use  -start omeprazole 40 mg once daily, call in 1-2 weeks if no improvement and can increase to BID  -avoid NSAIDs and alcohol  -nonulcerogenic diet  -patient does admit to drinking large amounts of caffeine  -plan for EGD to further assess, rule out H pylori, assess anastomosis, discussed procedure, risks and benefits in detail with patient  -advised to go to ED with any black stools or vomiting coffee grounds or blood  #2  Colon cancer screening: average risk, no family history of colon cancer, reports having colonoscopy in her 19's, no polyps at that time  -plan for colonoscopy  -Discussed procedure, prep, risks including bleeding, infection, and perforation and benefits with patient in detail  -Suprep ordered       ______________________________________________________________________    HPI:  This is a 75-year-old female with a history of hypothyroidism, cardiomyopathy, hypertension, anxiety, and history of gastric bypass in 2015 who reports for a new patient visit for epigastric abdominal pain  Patient reports that she was having epigastric discomfort approximately a 5/10 regularly for a few weeks and subsequently went to the emergency room on 03/28 to rule out cardiac etiology  She had labs done as well as an ultrasound which did not show any issues with her gallbladder or her pancreas  It was suspected that she could have gastritis or peptic ulcer disease and she was sent home on Pepcid 20 mg twice daily    She reports that at times she will take 2 of these at a time and reports that this gives her some relief in her abdominal discomfort  She sometimes has worsening pain after eating  It is typically in her epigastric radiating into right upper quadrant  She has been eating regularly and denies any nausea or vomiting  She reports that she does not use NSAIDs very frequently but she does admit to drinking a large amount of coffee on a daily basis  She denies any tobacco use reports that she drinks a maximum of 3 drinks on a weekly basis  She denies any recent unexplained weight loss  She denies any black tarry stools or blood in the stool  Her bowel movements are regular  Denies any family history of esophageal, gastric, or colon cancer  She reports she had a lap band previously which slipped and then ended up getting this removed and having gastric bypass performed in 2015  She reports having an endoscopy and colonoscopy in her 25s but have not had anything since then  She does have a family history of colitis  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 [employer] but looking for a job  REVIEW OF SYSTEMS:    CONSTITUTIONAL: Denies any fever, chills, rigors, and weight loss  HEENT: No earache or tinnitus  Denies hearing loss or visual disturbances  CARDIOVASCULAR: No chest pain or palpitations  RESPIRATORY: Denies any cough, hemoptysis, shortness of breath or dyspnea on exertion  GASTROINTESTINAL: As noted in the History of Present Illness  GENITOURINARY: No problems with urination  Denies any hematuria or dysuria  NEUROLOGIC: No dizziness or vertigo, denies headaches  MUSCULOSKELETAL: Denies any muscle or joint pain  SKIN: Denies skin rashes or itching  ENDOCRINE: Denies excessive thirst  Denies intolerance to heat or cold  PSYCHOSOCIAL: Denies depression or anxiety  Denies any recent memory loss         Historical Information   Past Medical History:   Diagnosis Date    Allergic     Anxiety     Asthma     Cardiomyopathy ([facility] [address]     Depression  Disease of thyroid gland     Interstitial cystitis      Past Surgical History:   Procedure Laterality Date    BARIATRIC SURGERY       SECTION      EAR RECONSTRUCTION      HYSTERECTOMY       Social History   Social History     Substance and Sexual Activity   Alcohol Use Yes    Comment: social     Social History     Substance and Sexual Activity   Drug Use Yes    Types: Marijuana     Social History     Tobacco Use   Smoking Status Never Smoker   Smokeless Tobacco Never Used     Family History   Problem Relation Age of Onset    Asthma Father     Arthritis Father     Cancer Mother     Cervical cancer Mother     Arthritis Mother        Meds/Allergies       Current Outpatient Medications:     albuterol (Proventil HFA) 90 mcg/act inhaler    cholecalciferol (VITAMIN D3) 25 mcg (1,000 units) tablet    famotidine (PEPCID) 20 mg tablet    levothyroxine 175 mcg tablet    liothyronine (CYTOMEL) 5 mcg tablet    lisinopril (ZESTRIL) 10 mg tablet    LORazepam (ATIVAN) 1 mg tablet    metoprolol tartrate (LOPRESSOR) 25 mg tablet    montelukast (Singulair) 10 mg tablet    PARoxetine (PAXIL) 30 mg tablet    traZODone (DESYREL) 50 mg tablet    vitamin B-12 (VITAMIN B-12) 1,000 mcg tablet    loratadine (CLARITIN) 10 mg tablet    Na Sulfate-K Sulfate-Mg Sulf 17 5-3 13-1 6 GM/177ML SOLN    omeprazole (PriLOSEC) 40 MG capsule    No Known Allergies        Objective     Blood pressure 108/70, pulse (!) 45, temperature 97 9 °F (36 6 °C), height 5' 3" (1 6 m), weight 75 8 kg (167 lb), SpO2 99 %  Body mass index is 29 58 kg/m²  PHYSICAL EXAM:      General Appearance:   Alert, cooperative, no distress   HEENT:   Normocephalic, atraumatic, anicteric      Neck:  Supple, symmetrical, trachea midline   Lungs:   Clear to auscultation bilaterally; no rales, rhonchi or wheezing; respirations unlabored    Heart[de-identified]   Regular rate and rhythm; no murmur, rub, or gallop     Abdomen:   Soft, epigastric discomfort to palpation, non-distended; normal bowel sounds; no masses, no organomegaly    Genitalia:   Deferred    Rectal:   Deferred    Extremities:  No cyanosis, clubbing or edema    Pulses:  2+ and symmetric    Skin:  No jaundice, rashes, or lesions    Lymph nodes:  No palpable cervical lymphadenopathy        Lab Results:   No visits with results within 1 Day(s) from this visit     Latest known visit with results is:   Admission on 03/28/2022, Discharged on 03/28/2022   Component Date Value    WBC 03/28/2022 5 33     RBC 03/28/2022 4 04     Hemoglobin 03/28/2022 11 8     Hematocrit 03/28/2022 37 0     MCV 03/28/2022 92     MCH 03/28/2022 29 2     MCHC 03/28/2022 31 9     RDW 03/28/2022 13 4     MPV 03/28/2022 10 7     Platelets 76/17/8265 191     nRBC 03/28/2022 0     Neutrophils Relative 03/28/2022 47     Immat GRANS % 03/28/2022 0     Lymphocytes Relative 03/28/2022 33     Monocytes Relative 03/28/2022 14*    Eosinophils Relative 03/28/2022 5     Basophils Relative 03/28/2022 1     Neutrophils Absolute 03/28/2022 2 54     Immature Grans Absolute 03/28/2022 0 01     Lymphocytes Absolute 03/28/2022 1 77     Monocytes Absolute 03/28/2022 0 72     Eosinophils Absolute 03/28/2022 0 25     Basophils Absolute 03/28/2022 0 04     Sodium 03/28/2022 140     Potassium 03/28/2022 3 3*    Chloride 03/28/2022 108     CO2 03/28/2022 26     ANION GAP 03/28/2022 6     BUN 03/28/2022 14     Creatinine 03/28/2022 0 85     Glucose 03/28/2022 95     Calcium 03/28/2022 8 2*    eGFR 03/28/2022 81     Total Bilirubin 03/28/2022 0 93     Bilirubin, Direct 03/28/2022 0 25*    Alkaline Phosphatase 03/28/2022 66     AST 03/28/2022 24     ALT 03/28/2022 26     Total Protein 03/28/2022 7 0     Albumin 03/28/2022 3 8     Lipase 03/28/2022 55*    hs TnI 0hr 03/28/2022 3     Ventricular Rate 03/28/2022 55     Atrial Rate 03/28/2022 55     NH Interval 03/28/2022 172     QRSD Interval 03/28/2022 90     QT Interval 03/28/2022 438     QTC Interval 03/28/2022 419     P Axis 03/28/2022 70     QRS Axis 03/28/2022 54     T Wave Axis 03/28/2022 -62          Radiology Results:   US right upper quadrant    Result Date: 3/28/2022  Narrative: RIGHT UPPER QUADRANT ULTRASOUND INDICATION:     RUQ pain  COMPARISON:  No prior ultrasound available for comparison  Correlation with CT abdomen pelvis August 31, 2015 TECHNIQUE:   Real-time ultrasound of the right upper quadrant was performed with a curvilinear transducer with both volumetric sweeps and still imaging techniques  FINDINGS: PANCREAS:  Visualized portions of the pancreas are within normal limits  AORTA AND IVC:  Visualized portions are normal for patient age  LIVER: Size:  Within normal range  The liver measures 13 7 cm in the midclavicular line  Contour:  Surface contour is smooth  Parenchyma:  Echogenicity and echotexture are within normal limits  No liver mass identified  Limited imaging of the main portal vein shows it to be patent and hepatopetal   BILIARY: The gallbladder is normal in caliber  No wall thickening or pericholecystic fluid  No stones or sludge identified  No sonographic Cagle's sign  No intrahepatic biliary dilatation  CBD measures 5 mm  No choledocholithiasis  KIDNEY: Right kidney measures 11 6 x 4 8 x 5 2 cm  Volume 151 0 mL Kidney within normal limits  ASCITES:   None       Impression: Normal  Workstation performed: [id number]

## 2022-04-28 ENCOUNTER — TELEPHONE (OUTPATIENT)
Dept: GASTROENTEROLOGY | Facility: AMBULARY SURGERY CENTER | Age: 47
End: 2022-04-28

## 2022-04-28 DIAGNOSIS — F41.9 ANXIETY: ICD-10-CM

## 2022-04-28 RX ORDER — TRAZODONE HYDROCHLORIDE 50 MG/1
50 TABLET ORAL DAILY
Qty: 30 TABLET | Refills: 0 | Status: SHIPPED | OUTPATIENT
Start: 2022-04-28 | End: 2022-06-20 | Stop reason: SDUPTHER

## 2022-04-28 NOTE — TELEPHONE ENCOUNTER
Pt called the GI office to reschedule her colonoscopy/egd w/ dr Cortney Claire @ [facility] from 5/4/2022 to 7/7/2022/pt states she has too much going on right now per pt

## 2022-05-19 ENCOUNTER — TELEPHONE (OUTPATIENT)
Dept: FAMILY MEDICINE CLINIC | Facility: CLINIC | Age: 47
End: 2022-05-19

## 2022-05-19 NOTE — TELEPHONE ENCOUNTER
Looks like pt had labs ordered in December , they imnclude tsh etc   Can we print these up and have pt  to have done?

## 2022-05-19 NOTE — TELEPHONE ENCOUNTER
I believe pt has a number of labs to get  That were ordered at our last visist and I believe that includes the TSH    Please call pt, print labs she can  up front

## 2022-05-19 NOTE — TELEPHONE ENCOUNTER
Left message on machine letting pt know order is up front for   No further action needed   Bard Luciano, Kylie

## 2022-05-19 NOTE — TELEPHONE ENCOUNTER
----- Message from Ismael Ruiz, [address] [place] sent at 5/19/2022  1:25 PM EDT -----  Regarding: FW: Blood work    ----- Message -----  From: Elisa Cuevas  Sent: 5/19/2022   1:22 PM EDT  To: THE [facility]  Subject: Blood work                                       I'm looking for a copy of my blood work to get done from my last visit  Also I'm experiencing some fatigue more than usual wondering if he wanted to do a tyroid Panel or any other testing when I go to get other blood work completed  Please let me know   Thank you

## 2022-05-19 NOTE — TELEPHONE ENCOUNTER
----- Message from Kyle Molina sent at 5/19/2022  1:25 PM EDT -----  Regarding: FW: Blood work    ----- Message -----  From: Marlin Stern  Sent: 5/19/2022   1:22 PM EDT  To: THE [facility]  Subject: Blood work                                       I'm looking for a copy of my blood work to get done from my last visit  Also I'm experiencing some fatigue more than usual wondering if he wanted to do a tyroid Panel or any other testing when I go to get other blood work completed  Please let me know   Thank you

## 2022-06-08 DIAGNOSIS — L25.9 CONTACT DERMATITIS, UNSPECIFIED CONTACT DERMATITIS TYPE, UNSPECIFIED TRIGGER: Primary | ICD-10-CM

## 2022-06-08 RX ORDER — METHYLPREDNISOLONE 4 MG/1
TABLET ORAL
Qty: 21 EACH | Refills: 0 | Status: SHIPPED | OUTPATIENT
Start: 2022-06-08 | End: 2022-09-06

## 2022-06-13 ENCOUNTER — OFFICE VISIT (OUTPATIENT)
Dept: URGENT CARE | Facility: CLINIC | Age: 47
End: 2022-06-13
Payer: COMMERCIAL

## 2022-06-13 VITALS
HEART RATE: 49 BPM | WEIGHT: 167 LBS | HEIGHT: 63 IN | BODY MASS INDEX: 29.59 KG/M2 | OXYGEN SATURATION: 97 % | TEMPERATURE: 98 F

## 2022-06-13 DIAGNOSIS — L23.7 POISON IVY DERMATITIS: Primary | ICD-10-CM

## 2022-06-13 PROCEDURE — 99203 OFFICE O/P NEW LOW 30 MIN: CPT | Performed by: PHYSICIAN ASSISTANT

## 2022-06-13 PROCEDURE — 1036F TOBACCO NON-USER: CPT | Performed by: PHYSICIAN ASSISTANT

## 2022-06-13 PROCEDURE — 3008F BODY MASS INDEX DOCD: CPT | Performed by: PHYSICIAN ASSISTANT

## 2022-06-13 RX ORDER — PREDNISONE 10 MG/1
TABLET ORAL
Qty: 20 TABLET | Refills: 0 | Status: SHIPPED | OUTPATIENT
Start: 2022-06-14 | End: 2022-06-26

## 2022-06-14 NOTE — PROGRESS NOTES
[facility] [employer] [facility]        NAME: Cayden Snow is a 48 y o  female  : 1975    MRN: [id number]  DATE: 2022  TIME: 8:53 PM    Assessment and Plan   Poison ivy dermatitis [L23 7]  1  Poison ivy dermatitis  predniSONE 10 mg tablet     Poison ivy needs to be treated with at least 10-14 days of steroids or else rebound will occur  Explained this to pt and she verbalized understanding  She does have h/o cardiomyopathy but states she has taken steroids multiple times in the past without a problem  Discussed strict return to care precautions as well as red flag symptoms which should prompt immediate ED referral  Pt verbalized understanding and is in agreement with plan  Please follow up with your primary care provider within the next week  Please remember that your visit today was with an urgent care provider and should not replace follow up with your primary care provider for chronic medical issues or annual physicals  Patient Instructions       Follow up with PCP in 3-5 days  Proceed to  ER if symptoms worsen  Chief Complaint     Chief Complaint   Patient presents with   [employer]     Has been on dose pack finished today still spreading          History of Present Illness       Rash  This is a recurrent problem  The current episode started in the past 7 days  The problem has been gradually worsening since onset  The affected locations include the torso, left upper leg, left leg, left ankle and right arm  The rash is characterized by redness, swelling and itchiness  She was exposed to plant contact and poison ivy/oak  The rash first occurred outside  Associated symptoms include itching  Pertinent negatives include no congestion, cough, decreased physical activity, diarrhea, facial edema, fatigue, fever, joint pain, rhinorrhea, shortness of breath, sore throat or vomiting   Past treatments include oral steroids (just finished Medrol dose pack prescribed by pcp, started to improve but now worse again)  The treatment provided mild relief  There were no sick contacts  Review of Systems   Review of Systems   Constitutional: Negative for chills, diaphoresis, fatigue and fever  HENT: Negative for congestion, rhinorrhea and sore throat  Eyes: Negative for pain, discharge and itching  Respiratory: Negative for cough, chest tightness, shortness of breath and wheezing  Cardiovascular: Negative for chest pain  Gastrointestinal: Negative for diarrhea, nausea and vomiting  Musculoskeletal: Negative for joint pain and myalgias  Skin: Positive for itching and rash  Neurological: Negative for weakness and numbness  Current Medications       Current Outpatient Medications:     cholecalciferol (VITAMIN D3) 25 mcg (1,000 units) tablet, , Disp: , Rfl:     levothyroxine 175 mcg tablet, Take 1 tablet (175 mcg total) by mouth daily Needs to be name brand, Disp: 90 tablet, Rfl: 1    liothyronine (CYTOMEL) 5 mcg tablet, Take 1 tablet (5 mcg total) by mouth daily, Disp: 90 tablet, Rfl: 1    lisinopril (ZESTRIL) 10 mg tablet, Take 10 mg by mouth daily, Disp: , Rfl:     LORazepam (ATIVAN) 1 mg tablet, Take 1 tablet (1 mg total) by mouth daily as needed for anxiety, Disp: 30 tablet, Rfl: 0    metoprolol tartrate (LOPRESSOR) 25 mg tablet, Take 0 5 tablets (12 5 mg total) by mouth every 12 (twelve) hours, Disp: 30 tablet, Rfl: 0    montelukast (Singulair) 10 mg tablet, Take 1 tablet (10 mg total) by mouth daily, Disp: 90 tablet, Rfl: 1    PARoxetine (PAXIL) 30 mg tablet, Take 1 tablet (30 mg total) by mouth in the morning, Disp: 90 tablet, Rfl: 1    [START ON 6/14/2022] predniSONE 10 mg tablet, Take 1 tablet (10 mg total) by mouth 3 (three) times a day for 3 days, THEN 1 tablet (10 mg total) 2 (two) times a day for 3 days, THEN 1 tablet (10 mg total) daily for 3 days, THEN 0 5 tablets (5 mg total) daily for 3 days  , Disp: 20 tablet, Rfl: 0    traZODone (DESYREL) 50 mg tablet, Take 1 tablet (50 mg total) by mouth daily, Disp: 30 tablet, Rfl: 0    vitamin B-12 (VITAMIN B-12) 1,000 mcg tablet, Take by mouth daily, Disp: , Rfl:     albuterol (Proventil HFA) 90 mcg/act inhaler, Inhale 2 puffs 4 (four) times a day as needed for wheezing (Patient not taking: Reported on 2022), Disp: 1 g, Rfl: 3    famotidine (PEPCID) 20 mg tablet, Take 1 tablet (20 mg total) by mouth 2 (two) times a day for 14 days, Disp: 28 tablet, Rfl: 0    loratadine (CLARITIN) 10 mg tablet, Take 1 tablet (10 mg total) by mouth daily (Patient not taking: Reported on 2022 ), Disp: 90 tablet, Rfl: 1    methylPREDNISolone 4 MG tablet therapy pack, Use as directed on package (Patient not taking: Reported on 2022), Disp: 21 each, Rfl: 0    Na Sulfate-K Sulfate-Mg Sulf 17 5-3 13-1 6 GM/177ML SOLN, Take 1 kit by mouth once for 1 dose, Disp: 354 mL, Rfl: 0    omeprazole (PriLOSEC) 40 MG capsule, Take 1 capsule (40 mg total) by mouth daily (Patient not taking: Reported on 2022), Disp: 30 capsule, Rfl: 2    Current Allergies     Allergies as of 2022    (No Known Allergies)            The following portions of the patient's history were reviewed and updated as appropriate: allergies, current medications, past family history, past medical history, past social history, past surgical history and problem list      Past Medical History:   Diagnosis Date    Allergic     Anxiety     Asthma     Cardiomyopathy ([address] )     Depression     Disease of thyroid gland     Interstitial cystitis        Past Surgical History:   Procedure Laterality Date    BARIATRIC SURGERY       SECTION      EAR RECONSTRUCTION      HYSTERECTOMY         Family History   Problem Relation Age of Onset    Asthma Father     Arthritis Father     Cancer Mother     Cervical cancer Mother     Arthritis Mother          Medications have been verified          Objective   Pulse (!) 49 Comment: history of low heart rate from metoprolol Temp 98 °F (36 7 °C)   Ht 5' 3" (1 6 m)   Wt 75 8 kg (167 lb)   SpO2 97%   BMI 29 58 kg/m²        Physical Exam     Physical Exam  Vitals and nursing note reviewed  Constitutional:       General: She is not in acute distress  Appearance: Normal appearance  She is not ill-appearing  HENT:      Head: Normocephalic and atraumatic  Cardiovascular:      Rate and Rhythm: Normal rate  Pulmonary:      Effort: Pulmonary effort is normal  No respiratory distress  Skin:     General: Skin is warm and dry  Capillary Refill: Capillary refill takes less than 2 seconds  Findings: Rash (maculopapular rash present as described in HPI) present  Neurological:      Mental Status: She is alert and oriented to person, place, and time     Psychiatric:         Behavior: Behavior normal

## 2022-06-20 DIAGNOSIS — F41.9 ANXIETY: ICD-10-CM

## 2022-06-20 RX ORDER — TRAZODONE HYDROCHLORIDE 50 MG/1
50 TABLET ORAL DAILY
Qty: 30 TABLET | Refills: 0 | Status: SHIPPED | OUTPATIENT
Start: 2022-06-20 | End: 2022-08-04 | Stop reason: SDUPTHER

## 2022-06-20 RX ORDER — LORAZEPAM 1 MG/1
1 TABLET ORAL DAILY PRN
Qty: 30 TABLET | Refills: 0 | Status: SHIPPED | OUTPATIENT
Start: 2022-06-20

## 2022-06-20 NOTE — TELEPHONE ENCOUNTER
LVM for pt re: rescheduling GI procedure scheduled for 7/7/2022 at [facility] [place] per pt's request/provided direct phone # in scheduling on pt's phone message

## 2022-06-22 ENCOUNTER — TELEPHONE (OUTPATIENT)
Dept: GASTROENTEROLOGY | Facility: AMBULARY SURGERY CENTER | Age: 47
End: 2022-06-22

## 2022-06-22 NOTE — TELEPHONE ENCOUNTER
Pt called the GI office to schedule egd /colonoscopy that she cxd twice in the past  Pt is scheduled for 9/9/2022 at [facility] w/ dr Yoon Crawley  COVID test re-ordered for 9/3/2022/pt states she has bowel prep instructions from before

## 2022-07-12 LAB
ALBUMIN SERPL-MCNC: 4.1 G/DL (ref 3.8–4.8)
ALBUMIN/GLOB SERPL: 2.1 {RATIO} (ref 1.2–2.2)
ALP SERPL-CCNC: 60 IU/L (ref 44–121)
ALT SERPL-CCNC: 14 IU/L (ref 0–32)
ANA SER QL: NEGATIVE
AST SERPL-CCNC: 19 IU/L (ref 0–40)
BILIRUB SERPL-MCNC: 0.4 MG/DL (ref 0–1.2)
BUN SERPL-MCNC: 12 MG/DL (ref 6–24)
BUN/CREAT SERPL: 14 (ref 9–23)
CALCIUM SERPL-MCNC: 9 MG/DL (ref 8.7–10.2)
CCP IGA+IGG SERPL IA-ACNC: 5 UNITS (ref 0–19)
CHLORIDE SERPL-SCNC: 106 MMOL/L (ref 96–106)
CHOLEST SERPL-MCNC: 162 MG/DL (ref 100–199)
CO2 SERPL-SCNC: 23 MMOL/L (ref 20–29)
CREAT SERPL-MCNC: 0.83 MG/DL (ref 0.57–1)
CRP SERPL-MCNC: <1 MG/L (ref 0–10)
EGFR: 87 ML/MIN/1.73
ERYTHROCYTE [DISTWIDTH] IN BLOOD BY AUTOMATED COUNT: 13.2 % (ref 11.7–15.4)
ERYTHROCYTE [SEDIMENTATION RATE] IN BLOOD BY WESTERGREN METHOD: 2 MM/HR (ref 0–32)
GLOBULIN SER-MCNC: 2 G/DL (ref 1.5–4.5)
GLUCOSE SERPL-MCNC: 89 MG/DL (ref 65–99)
HCT VFR BLD AUTO: 31.8 % (ref 34–46.6)
HDLC SERPL-MCNC: 55 MG/DL
HGB BLD-MCNC: 10.6 G/DL (ref 11.1–15.9)
LDLC SERPL CALC-MCNC: 92 MG/DL (ref 0–99)
LDLC/HDLC SERPL: 1.7 RATIO (ref 0–3.2)
MCH RBC QN AUTO: 29.8 PG (ref 26.6–33)
MCHC RBC AUTO-ENTMCNC: 33.3 G/DL (ref 31.5–35.7)
MCV RBC AUTO: 89 FL (ref 79–97)
MICRODELETION SYND BLD/T FISH: NORMAL
PLATELET # BLD AUTO: 172 X10E3/UL (ref 150–450)
POTASSIUM SERPL-SCNC: 3.9 MMOL/L (ref 3.5–5.2)
PROT SERPL-MCNC: 6.1 G/DL (ref 6–8.5)
RBC # BLD AUTO: 3.56 X10E6/UL (ref 3.77–5.28)
RHEUMATOID FACT SERPL-ACNC: <10 IU/ML
SL AMB VLDL CHOLESTEROL CALC: 15 MG/DL (ref 5–40)
SODIUM SERPL-SCNC: 143 MMOL/L (ref 134–144)
TRIGL SERPL-MCNC: 80 MG/DL (ref 0–149)
TSH SERPL DL<=0.005 MIU/L-ACNC: 5.5 UIU/ML (ref 0.45–4.5)
WBC # BLD AUTO: 5.3 X10E3/UL (ref 3.4–10.8)

## 2022-07-13 ENCOUNTER — OFFICE VISIT (OUTPATIENT)
Dept: FAMILY MEDICINE CLINIC | Facility: CLINIC | Age: 47
End: 2022-07-13
Payer: COMMERCIAL

## 2022-07-13 VITALS
DIASTOLIC BLOOD PRESSURE: 50 MMHG | HEIGHT: 63 IN | TEMPERATURE: 98.2 F | OXYGEN SATURATION: 97 % | RESPIRATION RATE: 16 BRPM | SYSTOLIC BLOOD PRESSURE: 80 MMHG | HEART RATE: 72 BPM | WEIGHT: 176 LBS | BODY MASS INDEX: 31.18 KG/M2

## 2022-07-13 DIAGNOSIS — E66.9 OBESITY (BMI 30.0-34.9): ICD-10-CM

## 2022-07-13 DIAGNOSIS — I42.9 CARDIOMYOPATHY, UNSPECIFIED TYPE (HCC): ICD-10-CM

## 2022-07-13 DIAGNOSIS — Z23 NEED FOR VACCINATION: ICD-10-CM

## 2022-07-13 DIAGNOSIS — E03.9 HYPOTHYROIDISM, UNSPECIFIED TYPE: ICD-10-CM

## 2022-07-13 DIAGNOSIS — I10 ESSENTIAL HYPERTENSION: Primary | ICD-10-CM

## 2022-07-13 DIAGNOSIS — D50.9 IRON DEFICIENCY ANEMIA, UNSPECIFIED IRON DEFICIENCY ANEMIA TYPE: ICD-10-CM

## 2022-07-13 PROCEDURE — 3074F SYST BP LT 130 MM HG: CPT | Performed by: FAMILY MEDICINE

## 2022-07-13 PROCEDURE — 99214 OFFICE O/P EST MOD 30 MIN: CPT | Performed by: FAMILY MEDICINE

## 2022-07-13 PROCEDURE — 90471 IMMUNIZATION ADMIN: CPT

## 2022-07-13 PROCEDURE — 90715 TDAP VACCINE 7 YRS/> IM: CPT

## 2022-07-13 PROCEDURE — 3078F DIAST BP <80 MM HG: CPT | Performed by: FAMILY MEDICINE

## 2022-07-13 RX ORDER — LISINOPRIL 5 MG/1
5 TABLET ORAL DAILY
COMMUNITY
Start: 2022-04-19

## 2022-07-13 RX ORDER — METOPROLOL SUCCINATE 25 MG/1
TABLET, EXTENDED RELEASE ORAL DAILY
COMMUNITY
Start: 2022-04-19

## 2022-07-13 RX ORDER — LEVOTHYROXINE SODIUM 0.2 MG/1
200 TABLET ORAL DAILY
Qty: 90 TABLET | Refills: 1 | Status: SHIPPED | OUTPATIENT
Start: 2022-07-13

## 2022-07-13 NOTE — PATIENT INSTRUCTIONS
Obesity   AMBULATORY CARE:   Obesity  means your body mass index (BMI) is greater than 30  Your healthcare provider will use your height and weight to measure your BMI  The risks of obesity include  many health problems, including injuries or physical disability  · Diabetes (high blood sugar level)    · High blood pressure or high cholesterol    · Heart disease    · Stroke    · Gallbladder or liver disease    · Cancer of the colon, breast, prostate, liver, or kidney    · Sleep apnea    · Arthritis or gout    Screening  is done to check for health conditions before you have signs or symptoms  If you are 28to 79years old, your blood sugar level may be checked every 3 years for signs of prediabetes or diabetes  Your healthcare provider will check your blood pressure at each visit  High blood pressure can lead to a stroke or other problems  Your provider may check for signs of heart disease, cancer, or other health problems  Seek care immediately if:   · You have a severe headache, confusion, or difficulty speaking  · You have weakness on one side of your body  · You have chest pain, sweating, or shortness of breath  Call your doctor if:   · You have symptoms of gallbladder or liver disease, such as pain in your upper abdomen  · You have knee or hip pain and discomfort while walking  · You have symptoms of diabetes, such as intense hunger and thirst, and frequent urination  · You have symptoms of sleep apnea, such as snoring or daytime sleepiness  · You have questions or concerns about your condition or care  Treatment for obesity  focuses on helping you lose weight to improve your health  Even a small decrease in BMI can reduce the risk for many health problems  Your healthcare provider will help you set a weight-loss goal   · Lifestyle changes  are the first step in treating obesity  These include making healthy food choices and getting regular physical activity   Your healthcare provider may suggest a weight-loss program that involves coaching, education, and therapy  · Medicine  may help you lose weight when it is used with a healthy foods and physical activity  · Surgery  can help you lose weight if you are very obese and have other health problems  There are several types of weight-loss surgery  Ask your healthcare provider for more information  Tips for safe weight loss:   · Set small, realistic goals  An example of a small goal is to walk for 20 minutes 5 days a week  Anther goal is to lose 5% of your body weight  · Tell friends, family members, and coworkers about your goals  and ask for their support  Ask a friend to lose weight with you, or join a weight-loss support group  · Identify foods or triggers that may cause you to overeat , and find ways to avoid them  Remove tempting high-calorie foods from your home and workplace  Place a bowl of fresh fruit on your kitchen counter  If stress causes you to eat, then find other ways to cope with stress  A counselor or therapist may be able to help you  · Keep a diary to track what you eat and drink  Also write down how many minutes of physical activity you do each day  Weigh yourself once a week and record it in your diary  Eating changes: You will need to eat 500 to 1,000 fewer calories each day than you currently eat to lose 1 to 2 pounds a week  The following changes will help you cut calories:  · Eat smaller portions  Use small plates, no larger than 9 inches in diameter  Fill your plate half full of fruits and vegetables  Measure your food using measuring cups until you know what a serving size looks like  · Eat 3 meals and 1 or 2 snacks each day  Plan your meals in advance  [place] and eat at home most of the time  Eat slowly  Do not skip meals  Skipping meals can lead to overeating later in the day  This can make it harder for you to lose weight   Talk with a dietitian to help you make a meal plan and schedule that is right for you  · Eat fruits and vegetables at every meal   They are low in calories and high in fiber, which makes you feel full  Do not add butter, margarine, or cream sauce to vegetables  Use herbs to season steamed vegetables  · Eat less fat and fewer fried foods  Eat more baked or grilled chicken and fish  These protein sources are lower in calories and fat than red meat  Limit fast food  Dress your salads with olive oil and vinegar instead of bottled dressing  · Limit the amount of sugar you eat  Do not drink sugary beverages  Limit alcohol  Activity changes:  Physical activity is good for your body in many ways  It helps you burn calories and build strong muscles  It decreases stress and depression, and improves your mood  It can also help you sleep better  Talk to your healthcare provider before you begin an exercise program   · Exercise for at least 30 minutes 5 days a week  Start slowly  Set aside time each day for physical activity that you enjoy and that is convenient for you  It is best to do both weight training and an activity that increases your heart rate, such as walking, bicycling, or swimming  · Find ways to be more active  Do yard work and housecleaning  Walk up the stairs instead of using elevators  Spend your leisure time going to events that require walking, such as outdoor festivals or fairs  This extra physical activity can help you lose weight and keep it off  Follow up with your doctor as directed: You may need to meet with a dietitian  Write down your questions so you remember to ask them during your visits  © Copyright [employer] 2022 Information is for End User's use only and may not be sold, redistributed or otherwise used for commercial purposes  All illustrations and images included in CareNotes® are the copyrighted property of [employer]  or Hanna Jensen   The above information is an  only   It is not intended as medical advice for individual conditions or treatments  Talk to your doctor, nurse or pharmacist before following any medical regimen to see if it is safe and effective for you

## 2022-07-13 NOTE — PROGRESS NOTES
Assessment/Plan:    1  Essential hypertension  Assessment & Plan:  Pt states her BP is usually low like this all the time or lower - has always been    Orders:  -     Comprehensive metabolic panel; Future; Expected date: 12/25/2022  -     Lipid Panel with Direct LDL reflex; Future; Expected date: 12/25/2022  -     Comprehensive metabolic panel  -     Lipid Panel with Direct LDL reflex    2  Iron deficiency anemia, unspecified iron deficiency anemia type  Assessment & Plan: Will follow studies    Orders:  -     CBC; Future  -     TIBC; Future  -     Iron; Future  -     Ferritin; Future  -     CBC  -     TIBC  -     Iron  -     Ferritin  -     CBC; Future; Expected date: 12/25/2022  -     Lipid Panel with Direct LDL reflex; Future; Expected date: 12/25/2022  -     CBC  -     Lipid Panel with Direct LDL reflex    3  Peripartum cardiomyopathy  Assessment & Plan:  Pt on BB long term    Orders:  -     Lipid Panel with Direct LDL reflex; Future; Expected date: 12/25/2022  -     Lipid Panel with Direct LDL reflex    4  Hypothyroidism, unspecified type  Assessment & Plan:  Thyroid meds increased    Orders:  -     levothyroxine 200 mcg tablet; Take 1 tablet (200 mcg total) by mouth daily Needs to be name brand  -     TSH, 3rd generation; Future; Expected date: 08/27/2022  -     TSH, 3rd generation  -     Lipid Panel with Direct LDL reflex; Future; Expected date: 12/25/2022  -     TSH, 3rd generation; Future; Expected date: 12/25/2022  -     Lipid Panel with Direct LDL reflex  -     TSH, 3rd generation    5  Need for vaccination  -     TDAP VACCINE GREATER THAN OR EQUAL TO 6YO IM  -     Lipid Panel with Direct LDL reflex; Future; Expected date: 12/25/2022  -     Lipid Panel with Direct LDL reflex    6  Obesity (BMI 30 0-34 9)  -     Lipid Panel with Direct LDL reflex; Future; Expected date: 12/25/2022  -     Lipid Panel with Direct LDL reflex    7  BMI 31 0-31 9,adult  -     Lipid Panel with Direct LDL reflex;  Future; Expected date: 12/25/2022  -     Lipid Panel with Direct LDL reflex    8  Cardiomyopathy, unspecified type ([facility] [address] )  Assessment & Plan:  Pt on BB long term    Orders:  -     Lipid Panel with Direct LDL reflex; Future; Expected date: 12/25/2022  -     Lipid Panel with Direct LDL reflex            Patient Instructions     Obesity   AMBULATORY CARE:   Obesity  means your body mass index (BMI) is greater than 30  Your healthcare provider will use your height and weight to measure your BMI  The risks of obesity include  many health problems, including injuries or physical disability  Diabetes (high blood sugar level)    High blood pressure or high cholesterol    Heart disease    Stroke    Gallbladder or liver disease    Cancer of the colon, breast, prostate, liver, or kidney    Sleep apnea    Arthritis or gout    Screening  is done to check for health conditions before you have signs or symptoms  If you are 28to 79years old, your blood sugar level may be checked every 3 years for signs of prediabetes or diabetes  Your healthcare provider will check your blood pressure at each visit  High blood pressure can lead to a stroke or other problems  Your provider may check for signs of heart disease, cancer, or other health problems  Seek care immediately if:   You have a severe headache, confusion, or difficulty speaking  You have weakness on one side of your body  You have chest pain, sweating, or shortness of breath  Call your doctor if:   You have symptoms of gallbladder or liver disease, such as pain in your upper abdomen  You have knee or hip pain and discomfort while walking  You have symptoms of diabetes, such as intense hunger and thirst, and frequent urination  You have symptoms of sleep apnea, such as snoring or daytime sleepiness  You have questions or concerns about your condition or care  Treatment for obesity  focuses on helping you lose weight to improve your health   Even a small decrease in BMI can reduce the risk for many health problems  Your healthcare provider will help you set a weight-loss goal   Lifestyle changes  are the first step in treating obesity  These include making healthy food choices and getting regular physical activity  Your healthcare provider may suggest a weight-loss program that involves coaching, education, and therapy  Medicine  may help you lose weight when it is used with a healthy foods and physical activity  Surgery  can help you lose weight if you are very obese and have other health problems  There are several types of weight-loss surgery  Ask your healthcare provider for more information  Tips for safe weight loss:   Set small, realistic goals  An example of a small goal is to walk for 20 minutes 5 days a week  Anther goal is to lose 5% of your body weight  Tell friends, family members, and coworkers about your goals  and ask for their support  Ask a friend to lose weight with you, or join a weight-loss support group  Identify foods or triggers that may cause you to overeat , and find ways to avoid them  Remove tempting high-calorie foods from your home and workplace  Place a bowl of fresh fruit on your kitchen counter  If stress causes you to eat, then find other ways to cope with stress  A counselor or therapist may be able to help you  Keep a diary to track what you eat and drink  Also write down how many minutes of physical activity you do each day  Weigh yourself once a week and record it in your diary  Eating changes: You will need to eat 500 to 1,000 fewer calories each day than you currently eat to lose 1 to 2 pounds a week  The following changes will help you cut calories:  Eat smaller portions  Use small plates, no larger than 9 inches in diameter  Fill your plate half full of fruits and vegetables  Measure your food using measuring cups until you know what a serving size looks like           Eat 3 meals and 1 or 2 snacks each day  Plan your meals in advance  T[place] and eat at home most of the time  Eat slowly  Do not skip meals  Skipping meals can lead to overeating later in the day  This can make it harder for you to lose weight  Talk with a dietitian to help you make a meal plan and schedule that is right for you  Eat fruits and vegetables at every meal   They are low in calories and high in fiber, which makes you feel full  Do not add butter, margarine, or cream sauce to vegetables  Use herbs to season steamed vegetables  Eat less fat and fewer fried foods  Eat more baked or grilled chicken and fish  These protein sources are lower in calories and fat than red meat  Limit fast food  Dress your salads with olive oil and vinegar instead of bottled dressing  Limit the amount of sugar you eat  Do not drink sugary beverages  Limit alcohol  Activity changes:  Physical activity is good for your body in many ways  It helps you burn calories and build strong muscles  It decreases stress and depression, and improves your mood  It can also help you sleep better  Talk to your healthcare provider before you begin an exercise program   Exercise for at least 30 minutes 5 days a week  Start slowly  Set aside time each day for physical activity that you enjoy and that is convenient for you  It is best to do both weight training and an activity that increases your heart rate, such as walking, bicycling, or swimming  Find ways to be more active  Do yard work and housecleaning  Walk up the stairs instead of using elevators  Spend your leisure time going to events that require walking, such as outdoor festivals or fairs  This extra physical activity can help you lose weight and keep it off  Follow up with your doctor as directed: You may need to meet with a dietitian  Write down your questions so you remember to ask them during your visits    © Copyright [employer] 2022 Information is for End User's use only and may not be sold, redistributed or otherwise used for commercial purposes  All illustrations and images included in CareNotes® are the copyrighted property of A [employer] A M [employer] Inc  or Hanna Cazares  The above information is an  only  It is not intended as medical advice for individual conditions or treatments  Talk to your doctor, nurse or pharmacist before following any medical regimen to see if it is safe and effective for you  Return in about 6 months (around 1/13/2023) for Recheck  Subjective:      Patient ID: Franky Martinez is a 48 y o  female  Chief Complaint   Patient presents with    Follow-up     Cat Nguyen MA         Pt is here to follow up labs        The following portions of the patient's history were reviewed and updated as appropriate: allergies, current medications, past family history, past medical history, past social history, past surgical history and problem list     Review of Systems   Constitutional: Negative  Negative for activity change, appetite change, chills, diaphoresis and fatigue  HENT: Negative  Negative for dental problem, ear pain, sinus pressure and sore throat  Eyes: Negative  Negative for photophobia, pain, discharge, redness, itching and visual disturbance  Respiratory: Negative for apnea and chest tightness  Cardiovascular: Negative  Negative for chest pain, palpitations and leg swelling  Gastrointestinal: Negative  Negative for abdominal distention, abdominal pain, constipation and diarrhea  Endocrine: Negative  Negative for cold intolerance and heat intolerance  Genitourinary: Negative  Negative for difficulty urinating and dyspareunia  Musculoskeletal: Negative  Negative for arthralgias and back pain  Skin: Negative  Allergic/Immunologic: Negative for environmental allergies  Neurological: Negative  Negative for dizziness  Psychiatric/Behavioral: Negative  Negative for agitation           Current Outpatient Medications   Medication Sig Dispense Refill    albuterol (Proventil HFA) 90 mcg/act inhaler Inhale 2 puffs 4 (four) times a day as needed for wheezing 1 g 3    cholecalciferol (VITAMIN D3) 25 mcg (1,000 units) tablet       levothyroxine 200 mcg tablet Take 1 tablet (200 mcg total) by mouth daily Needs to be name brand 90 tablet 1    liothyronine (CYTOMEL) 5 mcg tablet Take 1 tablet (5 mcg total) by mouth daily 90 tablet 1    lisinopril (ZESTRIL) 5 mg tablet Take 5 mg by mouth daily      LORazepam (ATIVAN) 1 mg tablet Take 1 tablet (1 mg total) by mouth daily as needed for anxiety 30 tablet 0    metoprolol tartrate (LOPRESSOR) 25 mg tablet Take 0 5 tablets (12 5 mg total) by mouth every 12 (twelve) hours 30 tablet 0    montelukast (Singulair) 10 mg tablet Take 1 tablet (10 mg total) by mouth daily 90 tablet 1    Na Sulfate-K Sulfate-Mg Sulf 17 5-3 13-1 6 GM/177ML SOLN Take 1 kit by mouth once for 1 dose 354 mL 0    PARoxetine (PAXIL) 30 mg tablet Take 1 tablet (30 mg total) by mouth in the morning 90 tablet 1    traZODone (DESYREL) 50 mg tablet Take 1 tablet (50 mg total) by mouth daily 30 tablet 0    vitamin B-12 (VITAMIN B-12) 1,000 mcg tablet Take by mouth daily      lisinopril (ZESTRIL) 10 mg tablet Take 10 mg by mouth daily (Patient not taking: Reported on 7/13/2022)      methylPREDNISolone 4 MG tablet therapy pack Use as directed on package (Patient not taking: No sig reported) 21 each 0    metoprolol succinate (TOPROL-XL) 25 mg 24 hr tablet TAKE 1/2 (ONE-HALF) TABLET BY MOUTH ONCE DAILY (Patient not taking: Reported on 7/13/2022)      omeprazole (PriLOSEC) 40 MG capsule Take 1 capsule (40 mg total) by mouth daily (Patient not taking: No sig reported) 30 capsule 2     No current facility-administered medications for this visit         Objective:    BP (!) 80/50   Pulse 72   Temp 98 2 °F (36 8 °C)   Resp 16   Ht 5' 3" (1 6 m)   Wt 79 8 kg (176 lb)   SpO2 97%   BMI 31 18 kg/m² Physical Exam  Vitals and nursing note reviewed  Constitutional:       General: She is not in acute distress  Appearance: She is well-developed  She is not diaphoretic  HENT:      Head: Normocephalic and atraumatic  Right Ear: External ear normal       Left Ear: External ear normal       Nose: Nose normal       Mouth/Throat:      Pharynx: No oropharyngeal exudate  Eyes:      General: No scleral icterus  Right eye: No discharge  Left eye: No discharge  Pupils: Pupils are equal, round, and reactive to light  Neck:      Thyroid: No thyromegaly  Cardiovascular:      Rate and Rhythm: Normal rate  Heart sounds: Normal heart sounds  No murmur heard  Pulmonary:      Effort: Pulmonary effort is normal  No respiratory distress  Breath sounds: Normal breath sounds  No wheezing  Abdominal:      General: Bowel sounds are normal  There is no distension  Palpations: Abdomen is soft  There is no mass  Tenderness: There is no abdominal tenderness  There is no guarding or rebound  Musculoskeletal:         General: Normal range of motion  Skin:     General: Skin is warm and dry  Findings: No erythema or rash  Neurological:      Mental Status: She is alert        Coordination: Coordination normal       Deep Tendon Reflexes: Reflexes normal    Psychiatric:         Behavior: Behavior normal            Recent Results (from the past 672 hour(s))   CBC    Collection Time: 07/07/22  8:07 AM   Result Value Ref Range    White Blood Cell Count 5 3 3 4 - 10 8 x10E3/uL    Red Blood Cell Count 3 56 (L) 3 77 - 5 28 x10E6/uL    Hemoglobin 10 6 (L) 11 1 - 15 9 g/dL    HCT 31 8 (L) 34 0 - 46 6 %    MCV 89 79 - 97 fL    MCH 29 8 26 6 - 33 0 pg    MCHC 33 3 31 5 - 35 7 g/dL    RDW 13 2 11 7 - 15 4 %    Platelet Count 292 318 - 450 x10E3/uL   Comprehensive metabolic panel    Collection Time: 07/07/22  8:07 AM   Result Value Ref Range    Glucose, Random 89 65 - 99 mg/dL    BUN 12 6 - 24 mg/dL    Creatinine 0 83 0 57 - 1 00 mg/dL    eGFR 87 >59 mL/min/1 73    SL AMB BUN/CREATININE RATIO 14 9 - 23    Sodium 143 134 - 144 mmol/L    Potassium 3 9 3 5 - 5 2 mmol/L    Chloride 106 96 - 106 mmol/L    CO2 23 20 - 29 mmol/L    CALCIUM 9 0 8 7 - 10 2 mg/dL    Protein, Total 6 1 6 0 - 8 5 g/dL    Albumin 4 1 3 8 - 4 8 g/dL    Globulin, Total 2 0 1 5 - 4 5 g/dL    Albumin/Globulin Ratio 2 1 1 2 - 2 2    TOTAL BILIRUBIN 0 4 0 0 - 1 2 mg/dL    Alk Phos Isoenzymes 60 44 - 121 IU/L    AST 19 0 - 40 IU/L    ALT 14 0 - 32 IU/L   Lipid Panel with Direct LDL reflex    Collection Time: 07/07/22  8:07 AM   Result Value Ref Range    Cholesterol, Total 162 100 - 199 mg/dL    Triglycerides 80 0 - 149 mg/dL    HDL 55 >39 mg/dL    VLDL Cholesterol Calculated 15 5 - 40 mg/dL    LDL Calculated 92 0 - 99 mg/dL    LDl/HDL Ratio 1 7 0 0 - 3 2 ratio   TSH, 3rd generation    Collection Time: 07/07/22  8:07 AM   Result Value Ref Range    TSH 5 500 (H) 0 450 - 4 500 uIU/mL   Rheumatoid Arthritis Factor    Collection Time: 07/07/22  8:07 AM   Result Value Ref Range    Rheumatoid Factor (RF) <10 0 <14 0 IU/mL   Anti-CCP Ab, IgG/IgA    Collection Time: 07/07/22  8:07 AM   Result Value Ref Range    ANTI-CCP AB, IGG/IGA 5 0 - 19 units   LENIN w/Reflex    Collection Time: 07/07/22  8:07 AM   Result Value Ref Range    LENIN Negative Negative   Sedimentation rate, automated    Collection Time: 07/07/22  8:07 AM   Result Value Ref Range    Sedimentation Rate 2 0 - 32 mm/hr   C-reactive protein    Collection Time: 07/07/22  8:07 AM   Result Value Ref Range    C-Reactive Protein, Quant <1 0 - 10 mg/L   Cardiovascular Report    Collection Time: 07/07/22  8:07 AM   Result Value Ref Range    Interpretation Note           Tal Likes, DO  BMI Counseling: Body mass index is 31 18 kg/m²  The BMI is above normal  Nutrition recommendations include reducing portion sizes

## 2022-07-14 ENCOUNTER — TELEPHONE (OUTPATIENT)
Dept: FAMILY MEDICINE CLINIC | Facility: CLINIC | Age: 47
End: 2022-07-14

## 2022-07-14 NOTE — TELEPHONE ENCOUNTER
Sounds like she has been on both for quite some time, I just increased one last night    I do not care about brand name  Please confirm again with pt she is taking both

## 2022-07-15 NOTE — TELEPHONE ENCOUNTER
Patient stated she did not call  She is taking both medications as she discussed with Dr Miguel Angel Dooley  Looks like Selene was calling

## 2022-08-04 DIAGNOSIS — F41.9 ANXIETY: ICD-10-CM

## 2022-08-05 RX ORDER — TRAZODONE HYDROCHLORIDE 50 MG/1
50 TABLET ORAL DAILY
Qty: 30 TABLET | Refills: 0 | Status: SHIPPED | OUTPATIENT
Start: 2022-08-05 | End: 2022-09-08 | Stop reason: SDUPTHER

## 2022-08-18 LAB
ERYTHROCYTE [DISTWIDTH] IN BLOOD BY AUTOMATED COUNT: 13.4 % (ref 11.7–15.4)
FERRITIN SERPL-MCNC: 11 NG/ML (ref 15–150)
HCT VFR BLD AUTO: 35.2 % (ref 34–46.6)
HGB BLD-MCNC: 11.5 G/DL (ref 11.1–15.9)
IRON SATN MFR SERPL: 10 % (ref 15–55)
IRON SERPL-MCNC: 38 UG/DL (ref 27–159)
MCH RBC QN AUTO: 27.1 PG (ref 26.6–33)
MCHC RBC AUTO-ENTMCNC: 32.7 G/DL (ref 31.5–35.7)
MCV RBC AUTO: 83 FL (ref 79–97)
PLATELET # BLD AUTO: 267 X10E3/UL (ref 150–450)
RBC # BLD AUTO: 4.24 X10E6/UL (ref 3.77–5.28)
TIBC SERPL-MCNC: 400 UG/DL (ref 250–450)
UIBC SERPL-MCNC: 362 UG/DL (ref 131–425)
WBC # BLD AUTO: 6.5 X10E3/UL (ref 3.4–10.8)

## 2022-08-19 NOTE — RESULT ENCOUNTER NOTE
Labs are back you are not anemic at this time but your iron is a little low  If you are not doing soalready I would suggest OTC iron supplement once a day    We can follow the numbers at next blood draw

## 2022-09-08 DIAGNOSIS — F41.9 ANXIETY: ICD-10-CM

## 2022-09-09 RX ORDER — LORAZEPAM 1 MG/1
1 TABLET ORAL DAILY PRN
Qty: 30 TABLET | Refills: 0 | Status: SHIPPED | OUTPATIENT
Start: 2022-09-09

## 2022-09-09 RX ORDER — TRAZODONE HYDROCHLORIDE 50 MG/1
50 TABLET ORAL DAILY
Qty: 30 TABLET | Refills: 0 | Status: SHIPPED | OUTPATIENT
Start: 2022-09-09 | End: 2022-10-14 | Stop reason: SDUPTHER

## 2022-09-10 DIAGNOSIS — Z11.59 SCREENING FOR VIRAL DISEASE: ICD-10-CM

## 2022-09-10 PROCEDURE — U0003 INFECTIOUS AGENT DETECTION BY NUCLEIC ACID (DNA OR RNA); SEVERE ACUTE RESPIRATORY SYNDROME CORONAVIRUS 2 (SARS-COV-2) (CORONAVIRUS DISEASE [COVID-19]), AMPLIFIED PROBE TECHNIQUE, MAKING USE OF HIGH THROUGHPUT TECHNOLOGIES AS DESCRIBED BY CMS-2020-01-R: HCPCS | Performed by: INTERNAL MEDICINE

## 2022-09-10 PROCEDURE — U0005 INFEC AGEN DETEC AMPLI PROBE: HCPCS | Performed by: INTERNAL MEDICINE

## 2022-09-11 LAB — SARS-COV-2 RNA RESP QL NAA+PROBE: NEGATIVE

## 2022-09-16 ENCOUNTER — ANESTHESIA EVENT (OUTPATIENT)
Dept: GASTROENTEROLOGY | Facility: AMBULARY SURGERY CENTER | Age: 47
End: 2022-09-16

## 2022-09-16 ENCOUNTER — ANESTHESIA (OUTPATIENT)
Dept: GASTROENTEROLOGY | Facility: AMBULARY SURGERY CENTER | Age: 47
End: 2022-09-16

## 2022-09-16 ENCOUNTER — HOSPITAL ENCOUNTER (OUTPATIENT)
Dept: GASTROENTEROLOGY | Facility: AMBULARY SURGERY CENTER | Age: 47
Setting detail: OUTPATIENT SURGERY
Discharge: HOME | End: 2022-09-16
Attending: INTERNAL MEDICINE
Payer: COMMERCIAL

## 2022-09-16 VITALS
HEIGHT: 63 IN | RESPIRATION RATE: 18 BRPM | SYSTOLIC BLOOD PRESSURE: 118 MMHG | HEART RATE: 52 BPM | WEIGHT: 176 LBS | BODY MASS INDEX: 31.18 KG/M2 | TEMPERATURE: 98.2 F | OXYGEN SATURATION: 98 % | DIASTOLIC BLOOD PRESSURE: 70 MMHG

## 2022-09-16 DIAGNOSIS — R10.13 EPIGASTRIC PAIN: ICD-10-CM

## 2022-09-16 DIAGNOSIS — Z12.11 COLON CANCER SCREENING: ICD-10-CM

## 2022-09-16 DIAGNOSIS — D50.9 IRON DEFICIENCY ANEMIA, UNSPECIFIED IRON DEFICIENCY ANEMIA TYPE: ICD-10-CM

## 2022-09-16 PROCEDURE — 43235 EGD DIAGNOSTIC BRUSH WASH: CPT | Performed by: INTERNAL MEDICINE

## 2022-09-16 PROCEDURE — G0121 COLON CA SCRN NOT HI RSK IND: HCPCS | Performed by: INTERNAL MEDICINE

## 2022-09-16 RX ORDER — PROPOFOL 10 MG/ML
INJECTION, EMULSION INTRAVENOUS CONTINUOUS PRN
Status: DISCONTINUED | OUTPATIENT
Start: 2022-09-16 | End: 2022-09-16

## 2022-09-16 RX ORDER — PROPOFOL 10 MG/ML
INJECTION, EMULSION INTRAVENOUS AS NEEDED
Status: DISCONTINUED | OUTPATIENT
Start: 2022-09-16 | End: 2022-09-16

## 2022-09-16 RX ORDER — SODIUM CHLORIDE, SODIUM LACTATE, POTASSIUM CHLORIDE, CALCIUM CHLORIDE 600; 310; 30; 20 MG/100ML; MG/100ML; MG/100ML; MG/100ML
125 INJECTION, SOLUTION INTRAVENOUS CONTINUOUS
Status: DISCONTINUED | OUTPATIENT
Start: 2022-09-16 | End: 2022-09-20 | Stop reason: HOSPADM

## 2022-09-16 RX ORDER — SODIUM CHLORIDE, SODIUM LACTATE, POTASSIUM CHLORIDE, CALCIUM CHLORIDE 600; 310; 30; 20 MG/100ML; MG/100ML; MG/100ML; MG/100ML
INJECTION, SOLUTION INTRAVENOUS CONTINUOUS PRN
Status: DISCONTINUED | OUTPATIENT
Start: 2022-09-16 | End: 2022-09-16

## 2022-09-16 RX ORDER — SODIUM CHLORIDE, SODIUM LACTATE, POTASSIUM CHLORIDE, CALCIUM CHLORIDE 600; 310; 30; 20 MG/100ML; MG/100ML; MG/100ML; MG/100ML
125 INJECTION, SOLUTION INTRAVENOUS CONTINUOUS
Status: CANCELLED | OUTPATIENT
Start: 2022-09-16

## 2022-09-16 RX ORDER — LIDOCAINE HYDROCHLORIDE 10 MG/ML
INJECTION, SOLUTION EPIDURAL; INFILTRATION; INTRACAUDAL; PERINEURAL AS NEEDED
Status: DISCONTINUED | OUTPATIENT
Start: 2022-09-16 | End: 2022-09-16

## 2022-09-16 RX ADMIN — SODIUM CHLORIDE, SODIUM LACTATE, POTASSIUM CHLORIDE, AND CALCIUM CHLORIDE: .6; .31; .03; .02 INJECTION, SOLUTION INTRAVENOUS at 10:52

## 2022-09-16 RX ADMIN — PROPOFOL 50 MG: 10 INJECTION, EMULSION INTRAVENOUS at 11:04

## 2022-09-16 RX ADMIN — PROPOFOL 150 MG: 10 INJECTION, EMULSION INTRAVENOUS at 11:00

## 2022-09-16 RX ADMIN — LIDOCAINE HYDROCHLORIDE 50 MG: 10 INJECTION, SOLUTION EPIDURAL; INFILTRATION; INTRACAUDAL; PERINEURAL at 11:00

## 2022-09-16 RX ADMIN — PROPOFOL 100 MG: 10 INJECTION, EMULSION INTRAVENOUS at 11:02

## 2022-09-16 RX ADMIN — PROPOFOL 150 MCG/KG/MIN: 10 INJECTION, EMULSION INTRAVENOUS at 11:04

## 2022-09-16 NOTE — ANESTHESIA PREPROCEDURE EVALUATION
Procedure:  COLONOSCOPY  EGD    Relevant Problems   CARDIO   (+) Essential hypertension      ENDO   (+) Hypothyroidism      GI/HEPATIC   (+) S/P bariatric surgery      HEMATOLOGY   (+) Absolute anemia   (+) Iron deficiency anemia      NEURO/PSYCH   (+) Anxiety      PULMONARY   (+) Mild intermittent asthma        Physical Exam    Airway    Mallampati score: II  TM Distance: >3 FB  Neck ROM: full     Dental   No notable dental hx     Cardiovascular      Pulmonary      Other Findings        Anesthesia Plan  ASA Score- 2     Anesthesia Type- IV sedation with anesthesia with ASA Monitors  Additional Monitors:   Airway Plan:           Plan Factors-Exercise tolerance (METS): >4 METS  Chart reviewed  Existing labs reviewed  Patient summary reviewed  Patient is not a current smoker  Induction- intravenous  Postoperative Plan-     Informed Consent- Anesthetic plan and risks discussed with patient  I personally reviewed this patient with the CRNA  Discussed and agreed on the Anesthesia Plan with the CRNA  Jacob Dennis

## 2022-09-16 NOTE — H&P
History and Physical -  [facility]  Katharine Walter 50 y o  female MRN: [id number]        HPI:  75-year-old female history of gastric bypass surgery, cardiomyopathy reports having symptoms of bloating  Regular bowel movements  Historical Information   Past Medical History:   Diagnosis Date    Allergic     Anxiety     Asthma     Cardiomyopathy ([address] )     bradycardia normal 50-55 bpm    Depression     Disease of thyroid gland     Interstitial cystitis      Past Surgical History:   Procedure Laterality Date    BARIATRIC SURGERY      x2 surgeries lap band and bypass     SECTION      x2    EAR RECONSTRUCTION Left     age 4 skin drum skin graft    HYSTERECTOMY       Social History   Social History     Substance and Sexual Activity   Alcohol Use Yes    Comment: social     Social History     Substance and Sexual Activity   Drug Use Yes    Types: Marijuana     Social History     Tobacco Use   Smoking Status Never Smoker   Smokeless Tobacco Never Used     Family History   Problem Relation Age of Onset    Asthma Father     Arthritis Father     Cancer Mother     Cervical cancer Mother     Arthritis Mother        Meds/Allergies     (Not in a hospital admission)      No Known Allergies    Objective     Blood pressure 110/55, pulse 70, temperature 98 2 °F (36 8 °C), temperature source Temporal, resp  rate 18, height 5' 3" (1 6 m), weight 79 8 kg (176 lb), SpO2 98 %      PHYSICAL EXAM:    Gen: NAD  CV: S1 & S2 normal, RRR  CHEST: Clear to auscultate  ABD: soft, NT/ND, good bowel sounds  EXT: no edema    ASSESSMENT:     Dyspepsia, screening for colon cancer    PLAN:    EGD and colonoscopy

## 2022-09-16 NOTE — ANESTHESIA POSTPROCEDURE EVALUATION
Post-Op Assessment Note    CV Status:  Stable  Pain Score: 0    Pain management: adequate  Multimodal analgesia used between 6 hours prior to anesthesia start to PACU discharge    Mental Status:  Sleepy   Hydration Status:  Stable   PONV Controlled:  None   Airway Patency:  Patent   Two or more mitigation strategies used for obstructive sleep apnea   Post Op Vitals Reviewed: Yes      Staff: CRNA         No complications documented      BP   122/60   Temp      Pulse  50   Resp   20   SpO2   100

## 2022-10-22 LAB
ALBUMIN SERPL-MCNC: 4.4 G/DL (ref 3.8–4.8)
ALBUMIN/GLOB SERPL: 1.5 {RATIO} (ref 1.2–2.2)
ALP SERPL-CCNC: 78 IU/L (ref 44–121)
ALT SERPL-CCNC: 17 IU/L (ref 0–32)
AST SERPL-CCNC: 23 IU/L (ref 0–40)
BASOPHILS # BLD AUTO: 0.1 X10E3/UL (ref 0–0.2)
BASOPHILS NFR BLD AUTO: 1 %
BILIRUB SERPL-MCNC: 0.6 MG/DL (ref 0–1.2)
BUN SERPL-MCNC: 15 MG/DL (ref 6–24)
BUN/CREAT SERPL: 16 (ref 9–23)
CALCIUM SERPL-MCNC: 9.7 MG/DL (ref 8.7–10.2)
CHLORIDE SERPL-SCNC: 103 MMOL/L (ref 96–106)
CHOLEST SERPL-MCNC: 177 MG/DL (ref 100–199)
CO2 SERPL-SCNC: 23 MMOL/L (ref 20–29)
CREAT SERPL-MCNC: 0.94 MG/DL (ref 0.57–1)
EGFR: 74 ML/MIN/1.73
EOSINOPHIL # BLD AUTO: 0.1 X10E3/UL (ref 0–0.4)
EOSINOPHIL NFR BLD AUTO: 3 %
ERYTHROCYTE [DISTWIDTH] IN BLOOD BY AUTOMATED COUNT: 15 % (ref 11.7–15.4)
GLOBULIN SER-MCNC: 2.9 G/DL (ref 1.5–4.5)
GLUCOSE SERPL-MCNC: 59 MG/DL (ref 70–99)
HCT VFR BLD AUTO: 37.2 % (ref 34–46.6)
HDLC SERPL-MCNC: 56 MG/DL
HGB BLD-MCNC: 11.9 G/DL (ref 11.1–15.9)
IMM GRANULOCYTES # BLD: 0 X10E3/UL (ref 0–0.1)
IMM GRANULOCYTES NFR BLD: 0 %
LDLC SERPL CALC-MCNC: 103 MG/DL (ref 0–99)
LYMPHOCYTES # BLD AUTO: 2 X10E3/UL (ref 0.7–3.1)
LYMPHOCYTES NFR BLD AUTO: 41 %
MCH RBC QN AUTO: 27.2 PG (ref 26.6–33)
MCHC RBC AUTO-ENTMCNC: 32 G/DL (ref 31.5–35.7)
MCV RBC AUTO: 85 FL (ref 79–97)
MICRODELETION SYND BLD/T FISH: NORMAL
MONOCYTES # BLD AUTO: 0.5 X10E3/UL (ref 0.1–0.9)
MONOCYTES NFR BLD AUTO: 10 %
NEUTROPHILS # BLD AUTO: 2.1 X10E3/UL (ref 1.4–7)
NEUTROPHILS NFR BLD AUTO: 45 %
PLATELET # BLD AUTO: 205 X10E3/UL (ref 150–450)
POTASSIUM SERPL-SCNC: 4 MMOL/L (ref 3.5–5.2)
PROT SERPL-MCNC: 7.3 G/DL (ref 6–8.5)
RBC # BLD AUTO: 4.38 X10E6/UL (ref 3.77–5.28)
SODIUM SERPL-SCNC: 141 MMOL/L (ref 134–144)
TRIGL SERPL-MCNC: 99 MG/DL (ref 0–149)
TSH SERPL DL<=0.005 MIU/L-ACNC: 5.41 UIU/ML (ref 0.45–4.5)
WBC # BLD AUTO: 4.8 X10E3/UL (ref 3.4–10.8)

## 2022-10-27 ENCOUNTER — OFFICE VISIT (OUTPATIENT)
Dept: FAMILY MEDICINE CLINIC | Facility: CLINIC | Age: 47
End: 2022-10-27
Payer: COMMERCIAL

## 2022-10-27 VITALS
SYSTOLIC BLOOD PRESSURE: 110 MMHG | HEART RATE: 90 BPM | RESPIRATION RATE: 16 BRPM | BODY MASS INDEX: 30.82 KG/M2 | DIASTOLIC BLOOD PRESSURE: 60 MMHG | WEIGHT: 174 LBS | TEMPERATURE: 97.5 F

## 2022-10-27 DIAGNOSIS — G89.4 CHRONIC PAIN SYNDROME: ICD-10-CM

## 2022-10-27 DIAGNOSIS — M25.561 ACUTE PAIN OF RIGHT KNEE: ICD-10-CM

## 2022-10-27 DIAGNOSIS — H53.2 DOUBLE VISION WITH BOTH EYES OPEN: Primary | ICD-10-CM

## 2022-10-27 DIAGNOSIS — H54.3 VISION LOSS, BILATERAL: ICD-10-CM

## 2022-10-27 DIAGNOSIS — M25.50 ARTHRALGIA, UNSPECIFIED JOINT: ICD-10-CM

## 2022-10-27 PROCEDURE — 99214 OFFICE O/P EST MOD 30 MIN: CPT | Performed by: FAMILY MEDICINE

## 2022-10-27 RX ORDER — CELECOXIB 200 MG/1
200 CAPSULE ORAL 2 TIMES DAILY
Qty: 60 CAPSULE | Refills: 1 | Status: SHIPPED | OUTPATIENT
Start: 2022-10-27

## 2022-10-27 NOTE — PROGRESS NOTES
Assessment/Plan:    1  Double vision with both eyes open  -     MRI brain w wo contrast; Future; Expected date: 10/27/2022  -     MRI cervical spine w wo contrast; Future; Expected date: 10/27/2022    2  Chronic pain syndrome  -     celecoxib (CeleBREX) 200 mg capsule; Take 1 capsule (200 mg total) by mouth 2 (two) times a day  -     MRI brain w wo contrast; Future; Expected date: 10/27/2022  -     MRI cervical spine w wo contrast; Future; Expected date: 10/27/2022    3  Acute pain of right knee  -     XR knee 3 vw right non injury; Future; Expected date: 10/27/2022  -     celecoxib (CeleBREX) 200 mg capsule; Take 1 capsule (200 mg total) by mouth 2 (two) times a day  -     MRI brain w wo contrast; Future; Expected date: 10/27/2022  -     MRI cervical spine w wo contrast; Future; Expected date: 10/27/2022    4  Arthralgia, unspecified joint  -     XR spine cervical complete 4 or 5 vw non injury; Future; Expected date: 10/27/2022  -     celecoxib (CeleBREX) 200 mg capsule; Take 1 capsule (200 mg total) by mouth 2 (two) times a day  -     MRI brain w wo contrast; Future; Expected date: 10/27/2022  -     MRI cervical spine w wo contrast; Future; Expected date: 10/27/2022    5  Vision loss, bilateral  -     MRI brain w wo contrast; Future; Expected date: 10/27/2022  -     MRI cervical spine w wo contrast; Future; Expected date: 10/27/2022  With the chronic pain complaints as well as double vision and vision lss at night I wonder about MS  She has a number of vague seeminghly unrelated issues but this would connect them  Pt advised she may need eval by neuro  I will start celebrex for her arthrlagias        There are no Patient Instructions on file for this visit  No follow-ups on file  Subjective:      Patient ID: Soumya Nick is a 50 y o  female      Chief Complaint   Patient presents with   • Knee Pain   • Back Pain     Sas/cma       Pt is sched today for knee and back pain  Pt statesr baseline pain is a three generally states her back and neck is now a 5 or a 6  Back pain has been getting worse over the last few months  Pt states she cannot put weight on her rt knee when she is going to step up  States this started this week  States she hears her neck crack all the time  Hips crack as well    Mom had similar symptoms and was diagnosed with fibriomyalgia - she was on a duragesic pach and oxy      Pt states she also finds she is seeing double at night and needs to make an appt with an eye doc  States this came on suddenly  Pt states thios started around the end of setember  Pty repoprts chnges in her vision as well at night she has a blurr so reporting vision loss      The following portions of the patient's history were reviewed and updated as appropriate: allergies, current medications, past family history, past medical history, past social history, past surgical history and problem list     Review of Systems   Eyes: Positive for visual disturbance  Musculoskeletal: Positive for arthralgias, back pain, gait problem (anthalgic gait), myalgias, neck pain and neck stiffness           Current Outpatient Medications   Medication Sig Dispense Refill   • albuterol (Proventil HFA) 90 mcg/act inhaler Inhale 2 puffs 4 (four) times a day as needed for wheezing 1 g 3   • celecoxib (CeleBREX) 200 mg capsule Take 1 capsule (200 mg total) by mouth 2 (two) times a day 60 capsule 1   • cholecalciferol (VITAMIN D3) 25 mcg (1,000 units) tablet      • levothyroxine 200 mcg tablet Take 1 tablet (200 mcg total) by mouth daily Needs to be name brand 90 tablet 1   • liothyronine (CYTOMEL) 5 mcg tablet Take 1 tablet (5 mcg total) by mouth daily 90 tablet 1   • lisinopril (ZESTRIL) 5 mg tablet Take 5 mg by mouth daily     • LORazepam (ATIVAN) 1 mg tablet Take 1 tablet (1 mg total) by mouth daily as needed for anxiety 30 tablet 0   • metoprolol succinate (TOPROL-XL) 25 mg 24 hr tablet daily Takes half tab each dose 12 5mg     • montelukast (Singulair) 10 mg tablet Take 1 tablet (10 mg total) by mouth daily 90 tablet 1   • omeprazole (PriLOSEC) 40 MG capsule Take 1 capsule (40 mg total) by mouth daily 30 capsule 2   • PARoxetine (PAXIL) 30 mg tablet Take 1 tablet (30 mg total) by mouth in the morning (Patient taking differently: Take 30 mg by mouth daily at bedtime) 90 tablet 1   • traZODone (DESYREL) 50 mg tablet Take 1 tablet (50 mg total) by mouth daily 30 tablet 0   • vitamin B-12 (VITAMIN B-12) 1,000 mcg tablet Take by mouth daily     • Na Sulfate-K Sulfate-Mg Sulf 17 5-3 13-1 6 GM/177ML SOLN Take 1 kit by mouth once for 1 dose 354 mL 0     No current facility-administered medications for this visit  Objective:    /60   Pulse 90   Temp 97 5 °F (36 4 °C)   Resp 16   Wt 78 9 kg (174 lb)   BMI 30 82 kg/m²        Physical Exam  Vitals and nursing note reviewed  Constitutional:       General: She is not in acute distress  Appearance: She is well-developed  She is not diaphoretic  HENT:      Head: Normocephalic and atraumatic  Right Ear: External ear normal       Left Ear: External ear normal       Nose: Nose normal       Mouth/Throat:      Pharynx: No oropharyngeal exudate  Eyes:      General: No scleral icterus  Right eye: No discharge  Left eye: No discharge  Pupils: Pupils are equal, round, and reactive to light  Neck:      Thyroid: No thyromegaly  Cardiovascular:      Rate and Rhythm: Normal rate  Heart sounds: Normal heart sounds  No murmur heard  Pulmonary:      Effort: Pulmonary effort is normal  No respiratory distress  Breath sounds: Normal breath sounds  No wheezing  Abdominal:      General: Bowel sounds are normal  There is no distension  Palpations: Abdomen is soft  There is no mass  Tenderness: There is no abdominal tenderness  There is no guarding or rebound  Musculoskeletal:         General: Normal range of motion        Comments: creps on exam of rt knee  Joint is stable not swollen   Skin:     General: Skin is warm and dry  Findings: No erythema or rash  Neurological:      Mental Status: She is alert        Coordination: Coordination normal       Deep Tendon Reflexes: Reflexes normal    Psychiatric:         Behavior: Behavior normal                 Bonilla Prost

## 2022-10-28 DIAGNOSIS — H53.2 DOUBLE VISION WITH BOTH EYES OPEN: ICD-10-CM

## 2022-10-28 DIAGNOSIS — G89.4 CHRONIC PAIN SYNDROME: Primary | ICD-10-CM

## 2022-10-28 DIAGNOSIS — M25.561 ACUTE PAIN OF RIGHT KNEE: ICD-10-CM

## 2022-10-28 DIAGNOSIS — M25.50 ARTHRALGIA, UNSPECIFIED JOINT: ICD-10-CM

## 2022-10-31 ENCOUNTER — APPOINTMENT (OUTPATIENT)
Dept: RADIOLOGY | Facility: CLINIC | Age: 47
End: 2022-10-31

## 2022-10-31 DIAGNOSIS — M25.50 ARTHRALGIA, UNSPECIFIED JOINT: ICD-10-CM

## 2022-10-31 DIAGNOSIS — M25.561 ACUTE PAIN OF RIGHT KNEE: ICD-10-CM

## 2022-11-02 ENCOUNTER — TELEPHONE (OUTPATIENT)
Dept: FAMILY MEDICINE CLINIC | Facility: CLINIC | Age: 47
End: 2022-11-02

## 2022-11-02 NOTE — TELEPHONE ENCOUNTER
----- Message from Maria Del Carmen Click sent at 11/2/2022 10:09 AM EDT -----  Regarding: Xrays  Have you received reports yet?

## 2022-11-15 DIAGNOSIS — F41.9 ANXIETY: ICD-10-CM

## 2022-11-16 RX ORDER — TRAZODONE HYDROCHLORIDE 50 MG/1
50 TABLET ORAL DAILY
Qty: 30 TABLET | Refills: 0 | Status: SHIPPED | OUTPATIENT
Start: 2022-11-16

## 2022-11-22 ENCOUNTER — TELEPHONE (OUTPATIENT)
Dept: FAMILY MEDICINE CLINIC | Facility: CLINIC | Age: 47
End: 2022-11-22

## 2022-11-22 NOTE — TELEPHONE ENCOUNTER
Keo Flanagan is calling to clarify if the MRI is suppose to be with 3T magnet?   Usually it is 1 5    Patient apt is December 1st     Please call with questions, Caitlyn Lewis or any MRI tech at [phone]

## 2022-11-30 DIAGNOSIS — J45.20 MILD INTERMITTENT ASTHMA, UNSPECIFIED WHETHER COMPLICATED: ICD-10-CM

## 2022-11-30 DIAGNOSIS — F41.9 ANXIETY: ICD-10-CM

## 2022-11-30 RX ORDER — LORAZEPAM 1 MG/1
1 TABLET ORAL DAILY PRN
Qty: 30 TABLET | Refills: 0 | Status: SHIPPED | OUTPATIENT
Start: 2022-11-30

## 2022-11-30 RX ORDER — ALBUTEROL SULFATE 90 UG/1
2 AEROSOL, METERED RESPIRATORY (INHALATION) 4 TIMES DAILY PRN
Qty: 1 G | Refills: 0 | Status: SHIPPED | OUTPATIENT
Start: 2022-11-30

## 2022-12-21 DIAGNOSIS — F41.9 ANXIETY: ICD-10-CM

## 2022-12-21 RX ORDER — TRAZODONE HYDROCHLORIDE 50 MG/1
50 TABLET ORAL DAILY
Qty: 30 TABLET | Refills: 0 | Status: SHIPPED | OUTPATIENT
Start: 2022-12-21

## 2022-12-27 DIAGNOSIS — I10 ESSENTIAL HYPERTENSION: Primary | ICD-10-CM

## 2022-12-27 RX ORDER — LISINOPRIL 5 MG/1
5 TABLET ORAL DAILY
Qty: 90 TABLET | Refills: 1 | Status: SHIPPED | OUTPATIENT
Start: 2022-12-27

## 2022-12-27 RX ORDER — METOPROLOL SUCCINATE 25 MG/1
25 TABLET, EXTENDED RELEASE ORAL DAILY
Qty: 90 TABLET | Refills: 1 | Status: SHIPPED | OUTPATIENT
Start: 2022-12-27 | End: 2023-06-27

## 2022-12-30 ENCOUNTER — TELEPHONE (OUTPATIENT)
Dept: FAMILY MEDICINE CLINIC | Facility: CLINIC | Age: 47
End: 2022-12-30

## 2022-12-30 DIAGNOSIS — F41.9 ANXIETY: ICD-10-CM

## 2022-12-30 RX ORDER — PAROXETINE 30 MG/1
30 TABLET, FILM COATED ORAL DAILY
Qty: 90 TABLET | Refills: 0 | Status: SHIPPED | OUTPATIENT
Start: 2022-12-30

## 2022-12-30 NOTE — TELEPHONE ENCOUNTER
LMOM for patient to return call  I am thinking she is splitting the 25mg tablet and taking twice daily but want to confirm    Delmas Litter

## 2023-01-05 ENCOUNTER — RA CDI HCC (OUTPATIENT)
Dept: OTHER | Facility: HOSPITAL | Age: 48
End: 2023-01-05

## 2023-01-11 ENCOUNTER — HOSPITAL ENCOUNTER (OUTPATIENT)
Dept: RADIOLOGY | Facility: HOSPITAL | Age: 48
Discharge: HOME | End: 2023-01-11
Attending: FAMILY MEDICINE

## 2023-01-11 DIAGNOSIS — H54.3 VISION LOSS, BILATERAL: ICD-10-CM

## 2023-01-11 DIAGNOSIS — M25.50 ARTHRALGIA, UNSPECIFIED JOINT: ICD-10-CM

## 2023-01-11 DIAGNOSIS — H53.2 DOUBLE VISION WITH BOTH EYES OPEN: ICD-10-CM

## 2023-01-11 DIAGNOSIS — M25.561 ACUTE PAIN OF RIGHT KNEE: ICD-10-CM

## 2023-01-11 DIAGNOSIS — G89.4 CHRONIC PAIN SYNDROME: ICD-10-CM

## 2023-01-11 RX ADMIN — GADOBUTROL 8 ML: 604.72 INJECTION INTRAVENOUS at 08:48

## 2023-01-12 ENCOUNTER — OFFICE VISIT (OUTPATIENT)
Dept: FAMILY MEDICINE CLINIC | Facility: CLINIC | Age: 48
End: 2023-01-12

## 2023-01-12 VITALS
WEIGHT: 176 LBS | HEART RATE: 56 BPM | DIASTOLIC BLOOD PRESSURE: 64 MMHG | BODY MASS INDEX: 31.18 KG/M2 | HEIGHT: 63 IN | RESPIRATION RATE: 18 BRPM | OXYGEN SATURATION: 98 % | TEMPERATURE: 97.9 F | SYSTOLIC BLOOD PRESSURE: 112 MMHG

## 2023-01-12 DIAGNOSIS — I42.9 CARDIOMYOPATHY, UNSPECIFIED TYPE (HCC): ICD-10-CM

## 2023-01-12 DIAGNOSIS — H53.2 DOUBLE VISION: ICD-10-CM

## 2023-01-12 DIAGNOSIS — H54.7 ALTERATION IN VISION: ICD-10-CM

## 2023-01-12 DIAGNOSIS — Z23 NEED FOR VACCINATION: ICD-10-CM

## 2023-01-12 DIAGNOSIS — I10 ESSENTIAL HYPERTENSION: Primary | ICD-10-CM

## 2023-01-12 DIAGNOSIS — F41.9 ANXIETY: ICD-10-CM

## 2023-01-12 DIAGNOSIS — R79.89 ABNORMAL TSH: ICD-10-CM

## 2023-01-12 RX ORDER — PAROXETINE HYDROCHLORIDE 40 MG/1
40 TABLET, FILM COATED ORAL DAILY
Qty: 90 TABLET | Refills: 1 | Status: SHIPPED | OUTPATIENT
Start: 2023-01-12 | End: 2023-07-11

## 2023-01-12 NOTE — PROGRESS NOTES
Assessment/Plan:    1  Essential hypertension  Assessment & Plan:  stable      2  Need for vaccination  -     influenza vaccine, quadrivalent, 0 5 mL, preservative-free    3  Cardiomyopathy, unspecified type ([address] )    4  Abnormal TSH  -     TSH, 3rd generation; Future  -     TSH, 3rd generation    5  Double vision  -     Ambulatory Referral to Ophthalmology; Future    6  Alteration in vision  -     Ambulatory Referral to Ophthalmology; Future    7  Anxiety  -     PARoxetine (PAXIL) 40 MG tablet; Take 1 tablet (40 mg total) by mouth in the morning        MRI of the brain and c spine are not read yet    There are no Patient Instructions on file for this visit  Return for Next scheduled follow up  Subjective:      Patient ID: Nikkie Edouard is a 50 y o  female  Chief Complaint   Patient presents with   • Follow-up     Issues and MRI results  [id number]       Pt is sched to follow up MRI results  Pt would like her flu shot     Pt is asking about the possibility of the visual issues being related to Thyroid    Pt states the double vision has been the same, states it def light affected - states it blinds her at night, states she has not been driving much  Pt states it takes her more time than usual to refocus  Pt states this has been a consistant problem has not really gotten worse or better    Pt is asking about increasing her paxil  States she is having issues focusing etc      The following portions of the patient's history were reviewed and updated as appropriate: allergies, current medications, past family history, past medical history, past social history, past surgical history and problem list     Review of Systems   Constitutional: Negative  Negative for activity change, appetite change, chills, diaphoresis and fatigue  HENT: Negative  Negative for dental problem, ear pain, sinus pressure and sore throat  Eyes: Negative    Negative for photophobia, pain, discharge, redness, itching and visual disturbance  Double vision     Respiratory: Negative for apnea and chest tightness  Cardiovascular: Negative  Negative for chest pain, palpitations and leg swelling  Gastrointestinal: Negative  Negative for abdominal distention, abdominal pain, constipation and diarrhea  Endocrine: Negative  Negative for cold intolerance and heat intolerance  Genitourinary: Negative  Negative for difficulty urinating and dyspareunia  Musculoskeletal: Positive for arthralgias  Negative for back pain  Skin: Negative  Allergic/Immunologic: Negative for environmental allergies  Neurological: Negative  Negative for dizziness  Psychiatric/Behavioral: Negative  Negative for agitation           Current Outpatient Medications   Medication Sig Dispense Refill   • albuterol (Proventil HFA) 90 mcg/act inhaler Inhale 2 puffs 4 (four) times a day as needed for wheezing 1 g 0   • cholecalciferol (VITAMIN D3) 25 mcg (1,000 units) tablet      • levothyroxine 200 mcg tablet Take 1 tablet (200 mcg total) by mouth daily Needs to be name brand 90 tablet 1   • liothyronine (CYTOMEL) 5 mcg tablet Take 1 tablet (5 mcg total) by mouth daily 90 tablet 1   • lisinopril (ZESTRIL) 5 mg tablet Take 1 tablet (5 mg total) by mouth daily 90 tablet 1   • LORazepam (ATIVAN) 1 mg tablet Take 1 tablet (1 mg total) by mouth daily as needed for anxiety 30 tablet 0   • metoprolol succinate (TOPROL-XL) 25 mg 24 hr tablet Take 1 tablet (25 mg total) by mouth daily Takes half tab each dose 12 5mg 90 tablet 1   • montelukast (Singulair) 10 mg tablet Take 1 tablet (10 mg total) by mouth daily 90 tablet 1   • omeprazole (PriLOSEC) 40 MG capsule Take 1 capsule (40 mg total) by mouth daily 30 capsule 2   • PARoxetine (PAXIL) 40 MG tablet Take 1 tablet (40 mg total) by mouth in the morning 90 tablet 1   • traZODone (DESYREL) 50 mg tablet Take 1 tablet (50 mg total) by mouth daily 30 tablet 0   • vitamin B-12 (VITAMIN B-12) 1,000 mcg tablet Take by mouth daily     • Na Sulfate-K Sulfate-Mg Sulf 17 5-3 13-1 6 GM/177ML SOLN Take 1 kit by mouth once for 1 dose 354 mL 0     No current facility-administered medications for this visit  Objective:    /64   Pulse 56   Temp 97 9 °F (36 6 °C)   Resp 18   Ht 5' 3" (1 6 m)   Wt 79 8 kg (176 lb)   SpO2 98%   BMI 31 18 kg/m²        Physical Exam  Vitals and nursing note reviewed  Constitutional:       General: She is not in acute distress  Appearance: She is well-developed  She is not diaphoretic  HENT:      Head: Normocephalic and atraumatic  Right Ear: External ear normal       Left Ear: External ear normal       Nose: Nose normal       Mouth/Throat:      Pharynx: No oropharyngeal exudate  Eyes:      General: No scleral icterus  Right eye: No discharge  Left eye: No discharge  Pupils: Pupils are equal, round, and reactive to light  Neck:      Thyroid: No thyromegaly  Cardiovascular:      Rate and Rhythm: Normal rate  Heart sounds: Normal heart sounds  No murmur heard  Pulmonary:      Effort: Pulmonary effort is normal  No respiratory distress  Breath sounds: Normal breath sounds  No wheezing  Abdominal:      General: Bowel sounds are normal  There is no distension  Palpations: Abdomen is soft  There is no mass  Tenderness: There is no abdominal tenderness  There is no guarding or rebound  Musculoskeletal:         General: Normal range of motion  Skin:     General: Skin is warm and dry  Findings: No erythema or rash  Neurological:      Mental Status: She is alert        Coordination: Coordination normal       Deep Tendon Reflexes: Reflexes normal    Psychiatric:         Behavior: Behavior normal                 Chang Come, DO

## 2023-01-13 ENCOUNTER — TELEPHONE (OUTPATIENT)
Dept: FAMILY MEDICINE CLINIC | Facility: CLINIC | Age: 48
End: 2023-01-13

## 2023-01-13 DIAGNOSIS — H53.2 DOUBLE VISION: Primary | ICD-10-CM

## 2023-01-13 DIAGNOSIS — H54.7 ALTERATION IN VISION: ICD-10-CM

## 2023-01-13 NOTE — TELEPHONE ENCOUNTER
Spoke with pt about the MRI of the brain and c spine  Nothing specific was seen to lead us to M S  Pt will be seeing eye doc    Pt will also be sent to neuro  Advised of the foriminal narrowing as well  Pt does have pain symptoms as well

## 2023-01-18 ENCOUNTER — TELEPHONE (OUTPATIENT)
Dept: WOUND CARE | Facility: HOSPITAL | Age: 48
End: 2023-01-18

## 2023-01-18 ENCOUNTER — RA CDI HCC (OUTPATIENT)
Dept: OTHER | Facility: HOSPITAL | Age: 48
End: 2023-01-18

## 2023-01-18 DIAGNOSIS — H54.7 ALTERATION IN VISION: Primary | ICD-10-CM

## 2023-01-18 NOTE — TELEPHONE ENCOUNTER
Spoke to the patient, she wanted your opinion on Dr Shy Vega with Neurology? She also wanted to make you aware she is going to see Dr Te Walsh ophthalmology  She is requesting that a referral is placed for that    Yancey Cabot, LPN

## 2023-01-18 NOTE — TELEPHONE ENCOUNTER
I do not know the neurologist but I am sure he will be great    I will put order in for [facility]

## 2023-01-18 NOTE — TELEPHONE ENCOUNTER
----- Message from Bayron Lei, [address] sent at 1/18/2023 10:53 AM EST -----  Regarding: FW: MRIs are in  Contact: [phone]    ----- Message -----  From: Juan Manuel Rendon  Sent: 1/18/2023  10:48 AM EST  To: [facility]  Subject: MRIs are in                                      I have a few questions still  Can you call me when convenient for you   Ty

## 2023-01-19 NOTE — TELEPHONE ENCOUNTER
Called patient  Dr Lorenzo Steve is no longer taking new patients   She will call us back with which Dr perez will be using  Amrita Ames MA

## 2023-01-24 DIAGNOSIS — F41.9 ANXIETY: ICD-10-CM

## 2023-01-25 RX ORDER — TRAZODONE HYDROCHLORIDE 50 MG/1
50 TABLET ORAL DAILY
Qty: 30 TABLET | Refills: 0 | Status: SHIPPED | OUTPATIENT
Start: 2023-01-25

## 2023-01-25 RX ORDER — LORAZEPAM 1 MG/1
1 TABLET ORAL DAILY PRN
Qty: 30 TABLET | Refills: 0 | Status: SHIPPED | OUTPATIENT
Start: 2023-01-25

## 2023-02-28 DIAGNOSIS — F41.9 ANXIETY: ICD-10-CM

## 2023-03-01 RX ORDER — TRAZODONE HYDROCHLORIDE 50 MG/1
50 TABLET ORAL DAILY
Qty: 30 TABLET | Refills: 0 | Status: SHIPPED | OUTPATIENT
Start: 2023-03-01

## 2023-03-01 RX ORDER — LORAZEPAM 1 MG/1
1 TABLET ORAL DAILY PRN
Qty: 30 TABLET | Refills: 0 | Status: SHIPPED | OUTPATIENT
Start: 2023-03-01

## 2023-03-08 ENCOUNTER — OFFICE VISIT (OUTPATIENT)
Dept: FAMILY MEDICINE CLINIC | Facility: CLINIC | Age: 48
End: 2023-03-08

## 2023-03-08 VITALS
RESPIRATION RATE: 18 BRPM | BODY MASS INDEX: 30.48 KG/M2 | WEIGHT: 172 LBS | TEMPERATURE: 97.8 F | HEART RATE: 60 BPM | SYSTOLIC BLOOD PRESSURE: 122 MMHG | DIASTOLIC BLOOD PRESSURE: 72 MMHG | HEIGHT: 63 IN

## 2023-03-08 DIAGNOSIS — F41.9 ANXIETY: ICD-10-CM

## 2023-03-08 DIAGNOSIS — Z00.00 WELL ADULT EXAM: Primary | ICD-10-CM

## 2023-03-08 DIAGNOSIS — E66.9 OBESITY (BMI 30.0-34.9): ICD-10-CM

## 2023-03-08 DIAGNOSIS — D50.9 IRON DEFICIENCY ANEMIA, UNSPECIFIED IRON DEFICIENCY ANEMIA TYPE: ICD-10-CM

## 2023-03-08 DIAGNOSIS — E03.9 HYPOTHYROIDISM, UNSPECIFIED TYPE: ICD-10-CM

## 2023-03-08 DIAGNOSIS — Z12.31 SCREENING MAMMOGRAM FOR HIGH-RISK PATIENT: ICD-10-CM

## 2023-03-08 DIAGNOSIS — Z13.6 SCREENING FOR CARDIOVASCULAR CONDITION: ICD-10-CM

## 2023-03-08 DIAGNOSIS — I10 ESSENTIAL HYPERTENSION: ICD-10-CM

## 2023-03-09 DIAGNOSIS — R92.8 ABNORMAL MAMMOGRAM: Primary | ICD-10-CM

## 2023-03-09 NOTE — PATIENT INSTRUCTIONS
Obesity   AMBULATORY CARE:   Obesity  means your body mass index (BMI) is greater than 30  Your healthcare provider will use your age, height, and weight to measure your BMI  The risks of obesity include  many health problems, including injuries or physical disability  • Diabetes (high blood sugar level)    • High blood pressure or high cholesterol    • Heart disease    • Stroke    • Gallbladder or liver disease    • Cancer of the colon, breast, prostate, liver, or kidney    • Sleep apnea    • Arthritis or gout    Screening  is done to check for health conditions before you have signs or symptoms  If you are 28to 79years old, your blood sugar level may be checked every 3 years for signs of prediabetes or diabetes  Your healthcare provider will check your blood pressure at each visit  High blood pressure can lead to a stroke or other problems  Your provider may check for signs of heart disease, cancer, or other health problems  Seek care immediately if:   • You have a severe headache, confusion, or difficulty speaking  • You have weakness on one side of your body  • You have chest pain, sweating, or shortness of breath  Call your doctor if:   • You have symptoms of gallbladder or liver disease, such as pain in your upper abdomen  • You have knee or hip pain and discomfort while walking  • You have symptoms of diabetes, such as intense hunger and thirst, and frequent urination  • You have symptoms of sleep apnea, such as snoring or daytime sleepiness  • You have questions or concerns about your condition or care  Treatment for obesity  focuses on helping you lose weight to improve your health  Even a small decrease in BMI can reduce the risk for many health problems  Your healthcare provider will help you set a weight-loss goal   • Lifestyle changes  are the first step in treating obesity  These include making healthy food choices and getting regular physical activity   Your healthcare provider may suggest a weight-loss program that involves coaching, education, and therapy  • Medicine  may help you lose weight when it is used with a healthy foods and physical activity  • Surgery  can help you lose weight if you have obesity along with other health problems  Several types of weight-loss surgery are available  Ask your healthcare provider for more information  Tips for safe weight loss:   • Set small, realistic goals  An example of a small goal is to walk for 20 minutes 5 days a week  Anther goal is to lose 5% of your body weight  • Ask for support  Tell friends, family members, and coworkers about your goals  Ask someone to lose weight with you  You may also want to join a weight-loss support group  • Identify foods or triggers that may cause you to overeat  Remove tempting high-calorie foods from your home and workplace  Place a bowl of fresh fruit on your kitchen counter  If stress causes you to eat, find other ways to cope with stress  A counselor or therapist may be able to help you  • Track your daily calories and activity  Write down what you eat and drink  Also write down how many minutes of physical activity you do each day  • Track your weekly weight  Weigh yourself in the morning, before you eat or drink anything but after you use the bathroom  Use the same scale, in the same place, and in similar clothing each time  Only weigh yourself 1 to 2 times each week, or as directed  You may become discouraged if you weigh yourself every day  Eating changes: You will need to eat 500 to 1,000 fewer calories each day than you currently eat to lose 1 to 2 pounds a week  The following changes will help you cut calories:  • Eat smaller portions  Use small plates, no larger than 9 inches in diameter  Fill your plate half full of fruits and vegetables  Measure your food using measuring cups until you know what a serving size looks like           • Eat 3 meals and 1 or 2 snacks each day  Plan your meals in advance  Braxton Mai and eat at home most of the time  Eat slowly  Do not skip meals  Skipping meals can lead to overeating later in the day  This can make it harder for you to lose weight  Talk with a dietitian to help you make a meal plan and schedule that is right for you  • Eat fruits and vegetables at every meal   They are low in calories and high in fiber, which makes you feel full  Do not add butter, margarine, or cream sauce to vegetables  Use herbs to season steamed vegetables  • Eat less fat and fewer fried foods  Eat more baked or grilled chicken and fish  These protein sources are lower in calories and fat than red meat  Limit fast food  Dress your salads with olive oil and vinegar instead of bottled dressing  • Limit the amount of sugar you eat  Do not drink sugary beverages  Limit alcohol  Activity changes:  Physical activity is good for your body in many ways  It helps you burn calories and build strong muscles  It decreases stress and depression, and improves your mood  It can also help you sleep better  Talk to your healthcare provider before you begin an exercise program   • Exercise for at least 30 minutes 5 days a week  Start slowly  Set aside time each day for physical activity that you enjoy and that is convenient for you  It is best to do both weight training and an activity that increases your heart rate, such as walking, bicycling, or swimming  • Find ways to be more active  Do yard work and housecleaning  Walk up the stairs instead of using elevators  Spend your leisure time going to events that require walking, such as outdoor festivals or fairs  This extra physical activity can help you lose weight and keep it off  Follow up with your doctor as directed: You may need to meet with a dietitian  Write down your questions so you remember to ask them during your visits    © Copyright [employer] 2022 Information is for End User's use only and may not be sold, redistributed or otherwise used for commercial purposes  The above information is an  only  It is not intended as medical advice for individual conditions or treatments  Talk to your doctor, nurse or pharmacist before following any medical regimen to see if it is safe and effective for you

## 2023-03-09 NOTE — PROGRESS NOTES
[facility] HEALTH MAINTENANCE OFFICE VISIT  [facility] - [facility]    NAME: Ramiro Cornell  AGE: 50 y o  SEX: female  : 1975     DATE: 3/8/2023    Assessment and Plan     1  Well adult exam    2  Hypothyroidism, unspecified type  -     CBC; Future  -     Comprehensive metabolic panel; Future  -     Lipid Panel with Direct LDL reflex; Future  -     TSH, 3rd generation; Future  -     T4, free; Future  -     CBC  -     Comprehensive metabolic panel  -     Lipid Panel with Direct LDL reflex  -     TSH, 3rd generation  -     T4, free    3  Essential hypertension  Assessment & Plan:  stable    Orders:  -     CBC; Future  -     Comprehensive metabolic panel; Future  -     Lipid Panel with Direct LDL reflex; Future  -     TSH, 3rd generation; Future  -     T4, free; Future  -     CBC  -     Comprehensive metabolic panel  -     Lipid Panel with Direct LDL reflex  -     TSH, 3rd generation  -     T4, free    4  Iron deficiency anemia, unspecified iron deficiency anemia type  -     CBC; Future  -     Comprehensive metabolic panel; Future  -     Lipid Panel with Direct LDL reflex; Future  -     TSH, 3rd generation; Future  -     T4, free; Future  -     CBC  -     Comprehensive metabolic panel  -     Lipid Panel with Direct LDL reflex  -     TSH, 3rd generation  -     T4, free    5  Obesity (BMI 30 0-34 9)    6  BMI 30 0-30 9,adult    7  Anxiety  -     vortioxetine (TRINTELLIX) 10 MG tablet; Take 1 tablet (10 mg total) by mouth daily  -     CBC; Future  -     Comprehensive metabolic panel; Future  -     Lipid Panel with Direct LDL reflex; Future  -     TSH, 3rd generation; Future  -     T4, free; Future  -     CBC  -     Comprehensive metabolic panel  -     Lipid Panel with Direct LDL reflex  -     TSH, 3rd generation  -     T4, free  -     Ambulatory referral to Psychiatry; Future    8  Screening for cardiovascular condition  -     CBC;  Future  -     Comprehensive metabolic panel; Future  -     Lipid Panel with Direct LDL reflex; Future  -     TSH, 3rd generation; Future  -     T4, free; Future  -     CBC  -     Comprehensive metabolic panel  -     Lipid Panel with Direct LDL reflex  -     TSH, 3rd generation  -     T4, free    9  Screening mammogram for high-risk patient  -     Mammo screening bilateral w 3d & cad; Future; Expected date: 03/08/2023    10  Peripartum cardiomyopathy  -     Echo complete w/ contrast if indicated; Future; Expected date: 03/08/2023        Patient Counseling:   Nutrition: Stressed importance of a well balanced diet, moderation of sodium/saturated fat, caloric balance and sufficient intake of fiber  Exercise: Stressed the importance of regular exercise with a goal of 150 minutes per week  Dental Health: Discussed daily flossing and brushing and regular dental visits     Immunizations reviewed: Up To Date  Discussed benefits of:  Colon Cancer Screening, Mammogram , Cervical Cancer screening and Screening labs  BMI Counseling: Body mass index is 30 47 kg/m²  Discussed with patient's BMI with her  The BMI is above normal  Nutrition recommendations include reducing portion sizes  No follow-ups on file          Chief Complaint     Chief Complaint   Patient presents with   • Physical Exam     Sas/cma       History of Present Illness     Pt is sched for a full physical    Pt states since we last talked [facility] was unable [facility]  States she has been having trouble with focusing and anxiety at work  Pt tried cymbalta before but states in the past it was expensive        Well Adult Physical   Patient here for a comprehensive physical exam       Diet and Physical Activity  Diet: well balanced diet  Exercise: frequently      Depression Screen  PHQ-2/9 Depression Screening    Little interest or pleasure in doing things: 0 - not at all  Feeling down, depressed, or hopeless: 0 - not at all  Trouble falling or staying asleep, or sleeping too much: 3 - nearly every day  Feeling tired or having little energy: 0 - not at all  Poor appetite or overeatin - not at all  Feeling bad about yourself - or that you are a failure or have let yourself or your family down: 0 - not at all  Trouble concentrating on things, such as reading the newspaper or watching television: 1 - several days  Moving or speaking so slowly that other people could have noticed  Or the opposite - being so fidgety or restless that you have been moving around a lot more than usual: 1 - several days  Thoughts that you would be better off dead, or of hurting yourself in some way: 0 - not at all  PHQ-9 Score: 5   PHQ-9 Interpretation: Mild depression           General Health  Hearing: Normal:  bilateral  Vision: no vision problems  Dental: regular dental visits    Reproductive Health  No issues       The following portions of the patient's history were reviewed and updated as appropriate: allergies, current medications, past family history, past medical history, past social history, past surgical history and problem list     Review of Systems     Review of Systems   Constitutional: Negative  Negative for activity change, appetite change, chills, diaphoresis and fatigue  HENT: Negative  Negative for dental problem, ear pain, sinus pressure and sore throat  Eyes: Negative  Negative for photophobia, pain, discharge, redness, itching and visual disturbance  Respiratory: Negative for apnea and chest tightness  Cardiovascular: Negative  Negative for chest pain, palpitations and leg swelling  Gastrointestinal: Negative  Negative for abdominal distention, abdominal pain, constipation and diarrhea  Endocrine: Negative  Negative for cold intolerance and heat intolerance  Genitourinary: Negative  Negative for difficulty urinating and dyspareunia  Musculoskeletal: Negative  Negative for arthralgias and back pain  Skin: Negative      Allergic/Immunologic: Negative for environmental allergies  Neurological: Negative  Negative for dizziness  Psychiatric/Behavioral: Negative  Negative for agitation  Past Medical History     Past Medical History:   Diagnosis Date   • Allergic    • Anxiety    • Asthma    • Cardiomyopathy ([address] )     bradycardia normal 50-55 bpm   • Depression    • Disease of thyroid gland    • Interstitial cystitis        Past Surgical History     Past Surgical History:   Procedure Laterality Date   • BARIATRIC SURGERY      x2 surgeries lap band and bypass   •  SECTION      x2   • EAR RECONSTRUCTION Left     age 4 skin drum skin graft   • HYSTERECTOMY         Social History     Social History     Socioeconomic History   • Marital status: Legally      Spouse name: None   • Number of children: None   • Years of education: None   • Highest education level: None   Occupational History   • None   Tobacco Use   • Smoking status: Never   • Smokeless tobacco: Never   Vaping Use   • Vaping Use: Never used   Substance and Sexual Activity   • Alcohol use:  Yes     Alcohol/week: 1 0 standard drink     Types: 1 Cans of beer per week     Comment: social   • Drug use: Yes     Types: Marijuana   • Sexual activity: None   Other Topics Concern   • None   Social History Narrative   • None     Social Determinants of Health     Financial Resource Strain: Not on file   Food Insecurity: Not on file   Transportation Needs: Not on file   Physical Activity: Not on file   Stress: Not on file   Social Connections: Not on file   Intimate Partner Violence: Not on file   Housing Stability: Not on file       Family History     Family History   Problem Relation Age of Onset   • Asthma Father    • Arthritis Father    • Cancer Mother    • Cervical cancer Mother    • Arthritis Mother        Current Medications       Current Outpatient Medications:   •  albuterol (Proventil HFA) 90 mcg/act inhaler, Inhale 2 puffs 4 (four) times a day as needed for wheezing, Disp: 1 g, Rfl: 0  • cholecalciferol (VITAMIN D3) 25 mcg (1,000 units) tablet, , Disp: , Rfl:   •  levothyroxine 200 mcg tablet, Take 1 tablet (200 mcg total) by mouth daily Needs to be name brand, Disp: 90 tablet, Rfl: 1  •  liothyronine (CYTOMEL) 5 mcg tablet, Take 1 tablet (5 mcg total) by mouth daily, Disp: 90 tablet, Rfl: 1  •  lisinopril (ZESTRIL) 5 mg tablet, Take 1 tablet (5 mg total) by mouth daily, Disp: 90 tablet, Rfl: 1  •  LORazepam (ATIVAN) 1 mg tablet, Take 1 tablet (1 mg total) by mouth daily as needed for anxiety, Disp: 30 tablet, Rfl: 0  •  metoprolol succinate (TOPROL-XL) 25 mg 24 hr tablet, Take 1 tablet (25 mg total) by mouth daily Takes half tab each dose 12 5mg, Disp: 90 tablet, Rfl: 1  •  montelukast (Singulair) 10 mg tablet, Take 1 tablet (10 mg total) by mouth daily, Disp: 90 tablet, Rfl: 1  •  omeprazole (PriLOSEC) 40 MG capsule, Take 1 capsule (40 mg total) by mouth daily, Disp: 30 capsule, Rfl: 2  •  traZODone (DESYREL) 50 mg tablet, Take 1 tablet (50 mg total) by mouth daily, Disp: 30 tablet, Rfl: 0  •  vitamin B-12 (VITAMIN B-12) 1,000 mcg tablet, Take by mouth daily, Disp: , Rfl:   •  vortioxetine (TRINTELLIX) 10 MG tablet, Take 1 tablet (10 mg total) by mouth daily, Disp: 90 tablet, Rfl: 3  •  Na Sulfate-K Sulfate-Mg Sulf 17 5-3 13-1 6 GM/177ML SOLN, Take 1 kit by mouth once for 1 dose, Disp: 354 mL, Rfl: 0     Allergies     No Known Allergies    Objective     /72   Pulse 60   Temp 97 8 °F (36 6 °C)   Resp 18   Ht 5' 3" (1 6 m)   Wt 78 kg (172 lb)   BMI 30 47 kg/m²      Physical Exam  Vitals and nursing note reviewed  Constitutional:       General: She is not in acute distress  Appearance: She is well-developed  She is not diaphoretic  HENT:      Head: Normocephalic and atraumatic  Right Ear: External ear normal       Left Ear: External ear normal       Nose: Nose normal       Mouth/Throat:      Pharynx: No oropharyngeal exudate  Eyes:      General: No scleral icterus  Right eye: No discharge  Left eye: No discharge  Pupils: Pupils are equal, round, and reactive to light  Neck:      Thyroid: No thyromegaly  Cardiovascular:      Rate and Rhythm: Normal rate  Heart sounds: Normal heart sounds  No murmur heard  Pulmonary:      Effort: Pulmonary effort is normal  No respiratory distress  Breath sounds: Normal breath sounds  No wheezing  Abdominal:      General: Bowel sounds are normal  There is no distension  Palpations: Abdomen is soft  There is no mass  Tenderness: There is no abdominal tenderness  There is no guarding or rebound  Musculoskeletal:         General: Normal range of motion  Skin:     General: Skin is warm and dry  Findings: No erythema or rash  Neurological:      Mental Status: She is alert  Coordination: Coordination normal       Deep Tendon Reflexes: Reflexes normal    Psychiatric:         Behavior: Behavior normal            Vision Screening    Right eye Left eye Both eyes   Without correction 20/40 20/40 20/30   With correction              [facility]  BMI Counseling: Body mass index is 30 47 kg/m²  The BMI is above normal  Nutrition recommendations include reducing portion sizes  BMI Counseling: Body mass index is 30 47 kg/m²  The BMI is above normal  Nutrition recommendations include reducing portion sizes

## 2023-03-14 ENCOUNTER — TELEPHONE (OUTPATIENT)
Dept: FAMILY MEDICINE CLINIC | Facility: CLINIC | Age: 48
End: 2023-03-14

## 2023-03-14 ENCOUNTER — TELEPHONE (OUTPATIENT)
Dept: PSYCHIATRY | Facility: CLINIC | Age: 48
End: 2023-03-14

## 2023-03-14 DIAGNOSIS — F41.9 ANXIETY: Primary | ICD-10-CM

## 2023-03-14 DIAGNOSIS — I10 ESSENTIAL HYPERTENSION: ICD-10-CM

## 2023-03-14 NOTE — TELEPHONE ENCOUNTER
Ebenezer Nicole is aware and I gave her the phone number   No further action is required [address] [place] [address]

## 2023-03-14 NOTE — TELEPHONE ENCOUNTER
Patient stated that Dr Fannie York took over all of her cardiology needs? She stated if you need her to see a cardio you would need to recommend one

## 2023-03-14 NOTE — TELEPHONE ENCOUNTER
I left a message for [facility] to call back   Dr Kristen Osgood number is :  [phone] [facility]

## 2023-03-14 NOTE — TELEPHONE ENCOUNTER
Submitted prior auth for Trintellix  Waiting for a response      Key: [id number]  Rx#: [id number]    Wesley Fraser LPN

## 2023-03-17 NOTE — TELEPHONE ENCOUNTER
PA was denied  Filed out and faxed appeal form to fax number listed on it  Please note fax confirmation    Marla Gonzalez LPN

## 2023-03-20 RX ORDER — BUSPIRONE HYDROCHLORIDE 5 MG/1
5 TABLET ORAL 2 TIMES DAILY
Qty: 60 TABLET | Refills: 5 | Status: SHIPPED | OUTPATIENT
Start: 2023-03-20

## 2023-03-20 NOTE — TELEPHONE ENCOUNTER
Med was discontinued back in January  If patient has still been taking this then she is to continue and add on the buspar along with the paxil  Left message on machine requesting a call back    If she has any further questions to call us back  Chiqui Siu

## 2023-03-20 NOTE — TELEPHONE ENCOUNTER
Patient stated that she is fine with effexor  But she wanted to know about buspar and if that would work    Kylah Mcarthur

## 2023-03-24 DIAGNOSIS — F41.9 ANXIETY: ICD-10-CM

## 2023-03-24 DIAGNOSIS — F43.21 ADJUSTMENT REACTION WITH PROLONGED DEPRESSIVE REACTION: Primary | ICD-10-CM

## 2023-03-24 RX ORDER — PAROXETINE 30 MG/1
30 TABLET, FILM COATED ORAL EVERY MORNING
Start: 2023-03-24

## 2023-03-24 NOTE — TELEPHONE ENCOUNTER
Patient stated that she has been taking Paxil while dealing with the trintellix situation  Please update her chart to reflect this  Also patient wanted to know if she could continue taking the trazodone for sleep while on buspar  I asked Dr Jacky Morales and he stated that she is to continue all three meds and follow up with us for any other concern    Gardenia Biswas

## 2023-03-29 DIAGNOSIS — F41.9 ANXIETY: ICD-10-CM

## 2023-03-29 RX ORDER — TRAZODONE HYDROCHLORIDE 50 MG/1
50 TABLET ORAL DAILY
Qty: 30 TABLET | Refills: 0 | Status: SHIPPED | OUTPATIENT
Start: 2023-03-29

## 2023-04-28 DIAGNOSIS — F41.9 ANXIETY: ICD-10-CM

## 2023-04-28 RX ORDER — TRAZODONE HYDROCHLORIDE 50 MG/1
50 TABLET ORAL DAILY
Qty: 30 TABLET | Refills: 0 | Status: SHIPPED | OUTPATIENT
Start: 2023-04-28

## 2023-05-17 ENCOUNTER — CONSULT (OUTPATIENT)
Dept: CARDIOLOGY CLINIC | Facility: CLINIC | Age: 48
End: 2023-05-17

## 2023-05-17 VITALS
DIASTOLIC BLOOD PRESSURE: 70 MMHG | SYSTOLIC BLOOD PRESSURE: 110 MMHG | BODY MASS INDEX: 30.12 KG/M2 | WEIGHT: 170 LBS | HEIGHT: 63 IN

## 2023-05-17 DIAGNOSIS — I10 ESSENTIAL HYPERTENSION: ICD-10-CM

## 2023-05-17 DIAGNOSIS — I49.3 FREQUENT PVCS: ICD-10-CM

## 2023-05-18 NOTE — PROGRESS NOTES
[facility]  [address] [place] [address] [address]   [place], [address]    Cardiology Consultation    Florencio Wesley  [id number]  1975      Consult for:  Appreciate consult by: Samantha Rueda DO      Discussion/Summary:     1  Peripartum cardiomyopathy  POCT ECG    Ambulatory Referral to Cardiology    Echo complete w/ contrast if indicated    Holter monitor      2  Frequent PVCs        3  Essential hypertension  POCT ECG    Ambulatory Referral to Cardiology         -Patient with history of peripartum cardiomyopathy with last ejection fraction of 40%  She has had no previous hospitalization  Current medication regimen includes lisinopril and Toprol-XL 12 5 mg daily  Cannot increase dose due to hypotension   -She has frequent PVCs as well  -We will obtain 2D echocardiogram and Holter monitor   -Continue current medication regimen   -Encouraged exercise and diet modification  HPI:     Florencio Wesley is a 50 y o  here for valuation of peripartum cardiomyopathy  Patient was diagnosed with peripartum cardiomyopathy after the birth of her second child over 15 years ago  After baby was born, ECG was abnormal and echocardiogram showed an ejection fraction of 40%  Ejection fraction has remained stable over the years  She did not require any hospitalization for congestive heart failure in the past   Current medication regimen is lisinopril 5 mg daily and Toprol XL 12 5 mg daily  Dose was not able to be increased due to bradycardia and hypotension  She also has a history of frequent PVCs  Her last cardiac work-up was 2 years ago with her previous cardiologist   Cardiology records were reviewed  Denies any shortness of breath or chest pain with exertion  She does have myalgias and arthralgias along with anxiety disorder      Past Medical History:   Diagnosis Date   • Allergic    • Anxiety    • Asthma    • Cardiomyopathy ([address] )     bradycardia normal 50-55 bpm   • Depression • Disease of thyroid gland    • Interstitial cystitis      Social History     Tobacco Use   • Smoking status: Never   • Smokeless tobacco: Never   Vaping Use   • Vaping Use: Never used   Substance Use Topics   • Alcohol use:  Yes     Alcohol/week: 1 0 standard drink     Types: 1 Cans of beer per week     Comment: social   • Drug use: Yes     Types: Marijuana      Family History   Problem Relation Age of Onset   • Asthma Father    • Arthritis Father    • Cancer Mother    • Cervical cancer Mother    • Arthritis Mother      Past Surgical History:   Procedure Laterality Date   • BARIATRIC SURGERY      x2 surgeries lap band and bypass   •  SECTION      x2   • EAR RECONSTRUCTION Left     age 4 skin drum skin graft   • HYSTERECTOMY         Current Outpatient Medications:   •  albuterol (Proventil HFA) 90 mcg/act inhaler, Inhale 2 puffs 4 (four) times a day as needed for wheezing, Disp: 1 g, Rfl: 0  •  busPIRone (BUSPAR) 5 mg tablet, Take 1 tablet (5 mg total) by mouth 2 (two) times a day, Disp: 60 tablet, Rfl: 5  •  cholecalciferol (VITAMIN D3) 25 mcg (1,000 units) tablet, , Disp: , Rfl:   •  levothyroxine 200 mcg tablet, Take 1 tablet (200 mcg total) by mouth daily Needs to be name brand, Disp: 90 tablet, Rfl: 1  •  liothyronine (CYTOMEL) 5 mcg tablet, Take 1 tablet (5 mcg total) by mouth daily, Disp: 90 tablet, Rfl: 1  •  lisinopril (ZESTRIL) 5 mg tablet, Take 1 tablet (5 mg total) by mouth daily, Disp: 90 tablet, Rfl: 1  •  LORazepam (ATIVAN) 1 mg tablet, Take 1 tablet (1 mg total) by mouth daily as needed for anxiety, Disp: 30 tablet, Rfl: 0  •  metoprolol succinate (TOPROL-XL) 25 mg 24 hr tablet, Take 1 tablet (25 mg total) by mouth daily Takes half tab each dose 12 5mg, Disp: 90 tablet, Rfl: 1  •  montelukast (Singulair) 10 mg tablet, Take 1 tablet (10 mg total) by mouth daily, Disp: 90 tablet, Rfl: 1  •  omeprazole (PriLOSEC) 40 MG capsule, Take 1 capsule (40 mg total) by mouth daily, Disp: 30 capsule, Rfl: "2  •  traZODone (DESYREL) 50 mg tablet, Take 1 tablet (50 mg total) by mouth daily, Disp: 30 tablet, Rfl: 0  •  vitamin B-12 (VITAMIN B-12) 1,000 mcg tablet, Take by mouth daily, Disp: , Rfl:   •  Na Sulfate-K Sulfate-Mg Sulf 17 5-3 13-1 6 GM/177ML SOLN, Take 1 kit by mouth once for 1 dose, Disp: 354 mL, Rfl: 0  •  PARoxetine (PAXIL) 30 mg tablet, Take 1 tablet (30 mg total) by mouth every morning (Patient not taking: Reported on 5/17/2023), Disp: , Rfl:   No Known Allergies    Review of Systems:   Review of Systems   Constitutional: Positive for fatigue  Respiratory: Negative for shortness of breath  Cardiovascular: Negative for chest pain, palpitations and leg swelling  Musculoskeletal: Positive for arthralgias and myalgias  Psychiatric/Behavioral: The patient is nervous/anxious  All other systems reviewed and are negative  Physical Examination:     Vitals:    05/17/23 1511   BP: 110/70   BP Location: Right arm   Patient Position: Sitting   Cuff Size: Standard   Weight: 77 1 kg (170 lb)   Height: 5' 3\" (1 6 m)       Physical Exam   Constitutional: She appears healthy  No distress  Eyes: Pupils are equal, round, and reactive to light  Conjunctivae are normal    Neck: No JVD present  Cardiovascular: Normal rate and normal heart sounds  An irregular rhythm present  Frequent extrasystoles are present  Exam reveals no gallop and no friction rub  No murmur heard  Pulmonary/Chest: Effort normal and breath sounds normal  She has no wheezes  She has no rales  Musculoskeletal:         General: No tenderness, deformity or edema  Cervical back: Normal range of motion and neck supple  Neurological: She is alert and oriented to person, place, and time  Skin: Skin is warm and dry          Labs:     Lab Results   Component Value Date    WBC 5 7 04/19/2023    HGB 11 5 04/19/2023    HCT 35 2 04/19/2023    MCV 87 04/19/2023    RDW 13 8 04/19/2023     04/19/2023     BMP:  Lab Results " Component Value Date    SODIUM 141 04/19/2023    K 4 1 04/19/2023     04/19/2023    CO2 27 04/19/2023    BUN 10 04/19/2023    CREATININE 0 81 04/19/2023    GLUC 84 04/19/2023    GLUF 90 06/30/2020    CALCIUM 8 2 (L) 03/28/2022    EGFR 89 04/19/2023     LFT:  Lab Results   Component Value Date    AST 23 04/19/2023    ALT 15 04/19/2023    ALKPHOS 66 03/28/2022    TP 6 3 04/19/2023    ALB 4 0 04/19/2023      Lab Results   Component Value Date    ARL3DTSLZEOB 2 330 06/30/2020     No results found for: HGBA1C  Lipid Profile:   Lab Results   Component Value Date    CHOLESTEROL 151 04/19/2023    HDL 49 04/19/2023    LDLCALC 90 04/19/2023    TRIG 61 04/19/2023     Lab Results   Component Value Date    CHOLESTEROL 151 04/19/2023    CHOLESTEROL 177 10/21/2022     No results found for: CKTOTAL, CKMB, CKMBINDEX, TROPONINI  No results found for: NTBNP   No results found for this or any previous visit (from the past 672 hour(s))  Imaging & Testing   I have personally reviewed pertinent reports  Cardiac Testing   No results found for this or any previous visit  EKG: Personally reviewed  Normal sinus rhythm with frequent PVCs      Esteban Ibarra DO, [place]  [phone]  Please call with any questions

## 2023-05-21 ENCOUNTER — OFFICE VISIT (OUTPATIENT)
Dept: URGENT CARE | Facility: CLINIC | Age: 48
End: 2023-05-21

## 2023-05-21 VITALS
TEMPERATURE: 97 F | BODY MASS INDEX: 30.11 KG/M2 | WEIGHT: 170 LBS | HEART RATE: 52 BPM | OXYGEN SATURATION: 100 % | RESPIRATION RATE: 14 BRPM

## 2023-05-21 DIAGNOSIS — N39.0 URINARY TRACT INFECTION WITHOUT HEMATURIA, SITE UNSPECIFIED: Primary | ICD-10-CM

## 2023-05-21 LAB
SL AMB  POCT GLUCOSE, UA: ABNORMAL
SL AMB LEUKOCYTE ESTERASE,UA: ABNORMAL
SL AMB POCT BILIRUBIN,UA: ABNORMAL
SL AMB POCT BLOOD,UA: ABNORMAL
SL AMB POCT CLARITY,UA: ABNORMAL
SL AMB POCT COLOR,UA: YELLOW
SL AMB POCT KETONES,UA: ABNORMAL
SL AMB POCT NITRITE,UA: ABNORMAL
SL AMB POCT PH,UA: 6
SL AMB POCT SPECIFIC GRAVITY,UA: 1.01
SL AMB POCT URINE PROTEIN: ABNORMAL
SL AMB POCT UROBILINOGEN: 0.2

## 2023-05-21 RX ORDER — PHENAZOPYRIDINE HYDROCHLORIDE 200 MG/1
200 TABLET, FILM COATED ORAL
Qty: 6 TABLET | Refills: 0 | Status: SHIPPED | OUTPATIENT
Start: 2023-05-21 | End: 2023-05-23

## 2023-05-21 RX ORDER — FLUCONAZOLE 150 MG/1
150 TABLET ORAL ONCE
Qty: 1 TABLET | Refills: 0 | Status: SHIPPED | OUTPATIENT
Start: 2023-05-21 | End: 2023-05-21

## 2023-05-21 RX ORDER — CEPHALEXIN 500 MG/1
500 CAPSULE ORAL EVERY 12 HOURS SCHEDULED
Qty: 10 CAPSULE | Refills: 0 | Status: SHIPPED | OUTPATIENT
Start: 2023-05-21 | End: 2023-05-26

## 2023-05-21 NOTE — PROGRESS NOTES
[address] [facility]        NAME: Cortney Chauhan is a 50 y o  female  : 1975    MRN: [id number]  DATE: May 21, 2023  TIME: 12:42 PM    Assessment and Plan   Urinary tract infection without hematuria, site unspecified [N39 0]  1  Urinary tract infection without hematuria, site unspecified  POCT urine dip    Urine culture    cephalexin (KEFLEX) 500 mg capsule    phenazopyridine (PYRIDIUM) 200 mg tablet    fluconazole (DIFLUCAN) 150 mg tablet        Patient also requesting Diflucan in case she develops a yeast infection from the antibiotic  Discussed importance of maintaining adequate hydration  Discussed strict return to care precautions as well as red flag symptoms which should prompt immediate ED referral  Pt verbalized understanding and is in agreement with plan  Please follow up with your primary care provider within the next week  Please remember that your visit today was with an urgent care provider and should not replace follow up with your primary care provider for chronic medical issues or annual physicals  Patient Instructions       Follow up with PCP in 3-5 days  Proceed to  ER if symptoms worsen  Chief Complaint     Chief Complaint   Patient presents with   • Possible UTI     Pt has Hx of UTI ; mal odorous urine, spasms, pressure, started three days ago         History of Present Illness       Pt is a(n) 50 y o  female who presents out of concern for a UTI  Dysuria: Yes  Urgency: Yes  Frequency: Yes  Hematuria: No  Cloudy/malodorous urine: No  Vaginal discharge/itching: No  Concerns for STD: No  Possibility of pregnancy: No  LMP: No LMP recorded  Patient has had a hysterectomy  Fever: No but + chills  Flank pain: No  Nausea/vomiting: Yes nausea only  Previous UTIs: Yes          Review of Systems   Review of Systems   Constitutional: Positive for chills  Negative for diaphoresis and fever  Respiratory: Negative for shortness of breath  Cardiovascular: Negative for chest pain  Gastrointestinal: Positive for nausea  Negative for abdominal pain and vomiting  Genitourinary: Positive for dysuria, frequency and urgency  Negative for flank pain and hematuria  Musculoskeletal: Negative for myalgias  Psychiatric/Behavioral: Negative for confusion           Current Medications       Current Outpatient Medications:   •  albuterol (Proventil HFA) 90 mcg/act inhaler, Inhale 2 puffs 4 (four) times a day as needed for wheezing, Disp: 1 g, Rfl: 0  •  busPIRone (BUSPAR) 5 mg tablet, Take 1 tablet (5 mg total) by mouth 2 (two) times a day, Disp: 60 tablet, Rfl: 5  •  cephalexin (KEFLEX) 500 mg capsule, Take 1 capsule (500 mg total) by mouth every 12 (twelve) hours for 5 days, Disp: 10 capsule, Rfl: 0  •  cholecalciferol (VITAMIN D3) 25 mcg (1,000 units) tablet, , Disp: , Rfl:   •  fluconazole (DIFLUCAN) 150 mg tablet, Take 1 tablet (150 mg total) by mouth once for 1 dose, Disp: 1 tablet, Rfl: 0  •  levothyroxine 200 mcg tablet, Take 1 tablet (200 mcg total) by mouth daily Needs to be name brand, Disp: 90 tablet, Rfl: 1  •  liothyronine (CYTOMEL) 5 mcg tablet, Take 1 tablet (5 mcg total) by mouth daily, Disp: 90 tablet, Rfl: 1  •  lisinopril (ZESTRIL) 5 mg tablet, Take 1 tablet (5 mg total) by mouth daily, Disp: 90 tablet, Rfl: 1  •  LORazepam (ATIVAN) 1 mg tablet, Take 1 tablet (1 mg total) by mouth daily as needed for anxiety, Disp: 30 tablet, Rfl: 0  •  metoprolol succinate (TOPROL-XL) 25 mg 24 hr tablet, Take 1 tablet (25 mg total) by mouth daily Takes half tab each dose 12 5mg, Disp: 90 tablet, Rfl: 1  •  montelukast (Singulair) 10 mg tablet, Take 1 tablet (10 mg total) by mouth daily, Disp: 90 tablet, Rfl: 1  •  omeprazole (PriLOSEC) 40 MG capsule, Take 1 capsule (40 mg total) by mouth daily, Disp: 30 capsule, Rfl: 2  •  PARoxetine (PAXIL) 30 mg tablet, Take 1 tablet (30 mg total) by mouth every morning, Disp: , Rfl:   •  phenazopyridine (PYRIDIUM) 200 mg tablet, Take 1 tablet (200 mg total) by mouth 3 (three) times a day with meals for 2 days, Disp: 6 tablet, Rfl: 0  •  traZODone (DESYREL) 50 mg tablet, Take 1 tablet (50 mg total) by mouth daily, Disp: 30 tablet, Rfl: 0  •  vitamin B-12 (VITAMIN B-12) 1,000 mcg tablet, Take by mouth daily, Disp: , Rfl:   •  Na Sulfate-K Sulfate-Mg Sulf 17 5-3 13-1 6 GM/177ML SOLN, Take 1 kit by mouth once for 1 dose, Disp: 354 mL, Rfl: 0    Current Allergies     Allergies as of 2023   • (No Known Allergies)            The following portions of the patient's history were reviewed and updated as appropriate: allergies, current medications, past family history, past medical history, past social history, past surgical history and problem list      Past Medical History:   Diagnosis Date   • Allergic    • Anxiety    • Asthma    • Cardiomyopathy ([address] )     bradycardia normal 50-55 bpm   • Depression    • Disease of thyroid gland    • Interstitial cystitis        Past Surgical History:   Procedure Laterality Date   • BARIATRIC SURGERY      x2 surgeries lap band and bypass   •  SECTION      x2   • EAR RECONSTRUCTION Left     age 4 skin drum skin graft   • HYSTERECTOMY         Family History   Problem Relation Age of Onset   • Asthma Father    • Arthritis Father    • Cancer Mother    • Cervical cancer Mother    • Arthritis Mother          Medications have been verified  Objective   Pulse (!) 52   Temp (!) 97 °F (36 1 °C)   Resp 14   Wt 77 1 kg (170 lb)   SpO2 100%   BMI 30 11 kg/m²        Physical Exam     Physical Exam  Vitals and nursing note reviewed  Constitutional:       General: She is not in acute distress  Appearance: Normal appearance  She is not ill-appearing  HENT:      Head: Normocephalic and atraumatic  Cardiovascular:      Rate and Rhythm: Normal rate and regular rhythm  Heart sounds: Normal heart sounds  Pulmonary:      Effort: Pulmonary effort is normal  No respiratory distress  Breath sounds: Normal breath sounds  No wheezing, rhonchi or rales  Abdominal:      General: Abdomen is flat  Palpations: Abdomen is soft  Tenderness: There is no abdominal tenderness  There is no right CVA tenderness, left CVA tenderness or guarding  Skin:     General: Skin is warm and dry  Capillary Refill: Capillary refill takes less than 2 seconds  Neurological:      Mental Status: She is alert and oriented to person, place, and time     Psychiatric:         Behavior: Behavior normal

## 2023-05-24 ENCOUNTER — OFFICE VISIT (OUTPATIENT)
Dept: FAMILY MEDICINE CLINIC | Facility: CLINIC | Age: 48
End: 2023-05-24

## 2023-05-24 VITALS
HEART RATE: 68 BPM | BODY MASS INDEX: 30.11 KG/M2 | DIASTOLIC BLOOD PRESSURE: 70 MMHG | TEMPERATURE: 98.8 F | SYSTOLIC BLOOD PRESSURE: 122 MMHG | WEIGHT: 170 LBS | RESPIRATION RATE: 16 BRPM

## 2023-05-24 DIAGNOSIS — N39.0 URINARY TRACT INFECTION WITHOUT HEMATURIA, SITE UNSPECIFIED: Primary | ICD-10-CM

## 2023-05-24 PROBLEM — N30.10 INTERSTITIAL CYSTITIS: Status: ACTIVE | Noted: 2023-05-24

## 2023-05-24 LAB
SL AMB  POCT GLUCOSE, UA: ABNORMAL
SL AMB LEUKOCYTE ESTERASE,UA: ABNORMAL
SL AMB POCT BILIRUBIN,UA: ABNORMAL
SL AMB POCT BLOOD,UA: ABNORMAL
SL AMB POCT CLARITY,UA: ABNORMAL
SL AMB POCT COLOR,UA: YELLOW
SL AMB POCT KETONES,UA: ABNORMAL
SL AMB POCT NITRITE,UA: ABNORMAL
SL AMB POCT PH,UA: 6
SL AMB POCT SPECIFIC GRAVITY,UA: 1.03
SL AMB POCT URINE PROTEIN: ABNORMAL
SL AMB POCT UROBILINOGEN: 1

## 2023-05-24 RX ORDER — CIPROFLOXACIN 500 MG/1
500 TABLET, FILM COATED ORAL EVERY 12 HOURS SCHEDULED
Qty: 14 TABLET | Refills: 0 | Status: SHIPPED | OUTPATIENT
Start: 2023-05-24 | End: 2023-05-31

## 2023-05-24 NOTE — PROGRESS NOTES
Assessment/Plan:    1  Urinary tract infection without hematuria, site unspecified  -     POCT urine dip auto non-scope  -     Urine culture  -     ciprofloxacin (CIPRO) 500 mg tablet; Take 1 tablet (500 mg total) by mouth every 12 (twelve) hours for 7 days            There are no Patient Instructions on file for this visit  No follow-ups on file  Subjective:      Patient ID: Ari Hinojosa is a 50 y o  female  Chief Complaint   Patient presents with   • Urinary Tract Infection     Sas/cma       Pt is here for a same day appt for UTI  Pt was seen at urgent care for a UTI - was given keflex  Pt has hx of interstitial cystitios  Pt stillm has symptoms  Pt states she still has frequency and buyrning at end      The following portions of the patient's history were reviewed and updated as appropriate: allergies, current medications, past family history, past medical history, past social history, past surgical history and problem list     Review of Systems   Genitourinary: Positive for dysuria           Current Outpatient Medications   Medication Sig Dispense Refill   • albuterol (Proventil HFA) 90 mcg/act inhaler Inhale 2 puffs 4 (four) times a day as needed for wheezing 1 g 0   • busPIRone (BUSPAR) 5 mg tablet Take 1 tablet (5 mg total) by mouth 2 (two) times a day 60 tablet 5   • cephalexin (KEFLEX) 500 mg capsule Take 1 capsule (500 mg total) by mouth every 12 (twelve) hours for 5 days 10 capsule 0   • cholecalciferol (VITAMIN D3) 25 mcg (1,000 units) tablet      • ciprofloxacin (CIPRO) 500 mg tablet Take 1 tablet (500 mg total) by mouth every 12 (twelve) hours for 7 days 14 tablet 0   • levothyroxine 200 mcg tablet Take 1 tablet (200 mcg total) by mouth daily Needs to be name brand 90 tablet 1   • liothyronine (CYTOMEL) 5 mcg tablet Take 1 tablet (5 mcg total) by mouth daily 90 tablet 1   • lisinopril (ZESTRIL) 5 mg tablet Take 1 tablet (5 mg total) by mouth daily 90 tablet 1   • LORazepam (ATIVAN) 1 mg tablet Take 1 tablet (1 mg total) by mouth daily as needed for anxiety 30 tablet 0   • metoprolol succinate (TOPROL-XL) 25 mg 24 hr tablet Take 1 tablet (25 mg total) by mouth daily Takes half tab each dose 12 5mg 90 tablet 1   • montelukast (Singulair) 10 mg tablet Take 1 tablet (10 mg total) by mouth daily 90 tablet 1   • omeprazole (PriLOSEC) 40 MG capsule Take 1 capsule (40 mg total) by mouth daily 30 capsule 2   • PARoxetine (PAXIL) 30 mg tablet Take 1 tablet (30 mg total) by mouth every morning     • traZODone (DESYREL) 50 mg tablet Take 1 tablet (50 mg total) by mouth daily 30 tablet 0   • vitamin B-12 (VITAMIN B-12) 1,000 mcg tablet Take by mouth daily     • Na Sulfate-K Sulfate-Mg Sulf 17 5-3 13-1 6 GM/177ML SOLN Take 1 kit by mouth once for 1 dose 354 mL 0     No current facility-administered medications for this visit  Objective:    /70   Pulse 68   Temp 98 8 °F (37 1 °C)   Resp 16   Wt 77 1 kg (170 lb)   BMI 30 11 kg/m²        Physical Exam  Vitals reviewed  Constitutional:       General: She is not in acute distress  Appearance: She is well-developed  She is not diaphoretic  HENT:      Head: Normocephalic and atraumatic  Eyes:      General:         Right eye: No discharge  Left eye: No discharge  Conjunctiva/sclera: Conjunctivae normal    Pulmonary:      Effort: Pulmonary effort is normal  No respiratory distress  Musculoskeletal:      Cervical back: Normal range of motion                  Zoie Camacho DO

## 2023-05-25 LAB
BACTERIA UR CULT: ABNORMAL
BACTERIA UR CULT: NORMAL
Lab: ABNORMAL
Lab: NO GROWTH
SL AMB ANTIMICROBIAL SUSCEPTIBILITY: ABNORMAL

## 2023-05-26 DIAGNOSIS — E03.9 HYPOTHYROIDISM, UNSPECIFIED TYPE: ICD-10-CM

## 2023-05-26 DIAGNOSIS — F41.9 ANXIETY: ICD-10-CM

## 2023-05-26 DIAGNOSIS — T78.40XA ALLERGY, INITIAL ENCOUNTER: ICD-10-CM

## 2023-05-26 RX ORDER — TRAZODONE HYDROCHLORIDE 50 MG/1
50 TABLET ORAL DAILY
Qty: 30 TABLET | Refills: 0 | Status: SHIPPED | OUTPATIENT
Start: 2023-05-26

## 2023-05-26 RX ORDER — LORAZEPAM 1 MG/1
1 TABLET ORAL DAILY PRN
Qty: 30 TABLET | Refills: 0 | Status: SHIPPED | OUTPATIENT
Start: 2023-05-26

## 2023-05-26 RX ORDER — MONTELUKAST SODIUM 10 MG/1
TABLET ORAL
Qty: 90 TABLET | Refills: 0 | Status: SHIPPED | OUTPATIENT
Start: 2023-05-26

## 2023-05-26 RX ORDER — LEVOTHYROXINE SODIUM 0.2 MG/1
TABLET ORAL
Qty: 90 TABLET | Refills: 0 | Status: SHIPPED | OUTPATIENT
Start: 2023-05-26

## 2023-05-30 ENCOUNTER — TELEPHONE (OUTPATIENT)
Dept: NEUROLOGY | Facility: CLINIC | Age: 48
End: 2023-05-30

## 2023-06-06 DIAGNOSIS — N39.0 URINARY TRACT INFECTION WITHOUT HEMATURIA, SITE UNSPECIFIED: Primary | ICD-10-CM

## 2023-06-06 RX ORDER — FLUCONAZOLE 150 MG/1
150 TABLET ORAL ONCE
Qty: 1 TABLET | Refills: 0 | Status: SHIPPED | OUTPATIENT
Start: 2023-06-06 | End: 2023-06-06

## 2023-06-14 ENCOUNTER — CONSULT (OUTPATIENT)
Dept: NEUROLOGY | Facility: CLINIC | Age: 48
End: 2023-06-14
Payer: COMMERCIAL

## 2023-06-14 VITALS
DIASTOLIC BLOOD PRESSURE: 56 MMHG | HEART RATE: 53 BPM | BODY MASS INDEX: 30.48 KG/M2 | OXYGEN SATURATION: 98 % | WEIGHT: 172 LBS | TEMPERATURE: 97.1 F | HEIGHT: 63 IN | SYSTOLIC BLOOD PRESSURE: 110 MMHG

## 2023-06-14 DIAGNOSIS — G37.9 DEMYELINATING DISEASE (HCC): Primary | ICD-10-CM

## 2023-06-14 DIAGNOSIS — R29.898 WEAKNESS OF BOTH LEGS: ICD-10-CM

## 2023-06-14 DIAGNOSIS — H53.2 DOUBLE VISION: ICD-10-CM

## 2023-06-14 DIAGNOSIS — G89.29 CHRONIC LOWER BACK PAIN: ICD-10-CM

## 2023-06-14 DIAGNOSIS — H54.7 ALTERATION IN VISION: ICD-10-CM

## 2023-06-14 DIAGNOSIS — M54.50 CHRONIC LOWER BACK PAIN: ICD-10-CM

## 2023-06-14 PROCEDURE — 99205 OFFICE O/P NEW HI 60 MIN: CPT | Performed by: PSYCHIATRY & NEUROLOGY

## 2023-06-14 RX ORDER — FLUCONAZOLE 150 MG/1
TABLET ORAL AS NEEDED
COMMUNITY
Start: 2023-06-06

## 2023-06-14 NOTE — LETTER
June 14, 2023     Mirella Green DO  [address]  [facility] access hospital[facility] [address]    Patient: Wenceslao Taylor   YOB: 1975   Date of Visit: 6/14/2023       Dear Dr Bettye Ha:    Thank you for referring Carlos Ndiaye to me for evaluation  Below are my notes for this consultation  If you have questions, please do not hesitate to call me  I look forward to following your patient along with you  Sincerely,        Kade Fitzpatrick MD        CC: No Recipients    Kade Fitzpatrick MD  6/14/2023  3:41 PM  Incomplete  Outpatient Neurology History and Physical  Wenceslao Taylor  [id number]  50 y o   1975          Consult: Yes    Mirella Green DO      Chief Complaint   Patient presents with   • Alteration in vision   • Diplopia           History Obtained from: patient and sister     HPI:       Wenceslao Taylor is a 49 yo F with PMH of hearing loss, presents with cc of vision disturbance  She reports onset of double vision sometime in Dec/Jan  She says it's more of blurred vision  She does report working on computer all day long and at the end of day, they get very tired  She had reported associated pain  Her PCP had ordered MRI brain MS protocol which revealed mild white matter change within the frontal lobes is nonspecific for a patient of this age and differential considerations would include precocious chronic microangiopathic change, infectious/inflammatory etiology such as collagen vascular disorders or chronic demyelinating disease although pattern was not diagnostic for demyelinating condition  She had cervical spine MRI that had revealed C5-6 left > right canal stenosis and foraminal narrowing  Denies any extremity paresthesia or weakness  She has h/o post portum cardiomyopathy  She has been seen by ophthalmology who reported astigmatism and wanted her to wear bifocals but she hasn't gotten around to ordering them  She has poor tinnitus   She's had problem with her ears since childhood age  She also reports a lot of lower back pain  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       Past Medical History:   Diagnosis Date   • Allergic    • Anxiety    • Asthma    • Cardiomyopathy ([address] )     bradycardia normal 50-55 bpm   • Depression    • Disease of thyroid gland    • Interstitial cystitis                Current Outpatient Medications on File Prior to Visit   Medication Sig Dispense Refill   • albuterol (Proventil HFA) 90 mcg/act inhaler Inhale 2 puffs 4 (four) times a day as needed for wheezing 1 g 0   • busPIRone (BUSPAR) 5 mg tablet Take 1 tablet (5 mg total) by mouth 2 (two) times a day 60 tablet 5   • cholecalciferol (VITAMIN D3) 25 mcg (1,000 units) tablet      • fluconazole (DIFLUCAN) 150 mg tablet if needed     • levothyroxine 200 mcg tablet Take 1 tablet by mouth once daily 90 tablet 0   • liothyronine (CYTOMEL) 5 mcg tablet Take 1 tablet (5 mcg total) by mouth daily 90 tablet 1   • lisinopril (ZESTRIL) 5 mg tablet Take 1 tablet (5 mg total) by mouth daily 90 tablet 1   • LORazepam (ATIVAN) 1 mg tablet Take 1 tablet (1 mg total) by mouth daily as needed for anxiety 30 tablet 0   • metoprolol succinate (TOPROL-XL) 25 mg 24 hr tablet Take 1 tablet (25 mg total) by mouth daily Takes half tab each dose 12 5mg 90 tablet 1   • montelukast (SINGULAIR) 10 mg tablet Take 1 tablet by mouth once daily 90 tablet 0   • Na Sulfate-K Sulfate-Mg Sulf 17 5-3 13-1 6 GM/177ML SOLN Take 1 kit by mouth once for 1 dose (Patient taking differently: Take 1 kit by mouth if needed) 354 mL 0   • omeprazole (PriLOSEC) 40 MG capsule Take 1 capsule (40 mg total) by mouth daily (Patient taking differently: Take 40 mg by mouth if needed) 30 capsule 2   • PARoxetine (PAXIL) 30 mg tablet Take 1 tablet (30 mg total) by mouth every morning     • traZODone (DESYREL) 50 mg tablet Take 1 tablet (50 mg total) by mouth daily 30 tablet 0   • vitamin B-12 (VITAMIN B-12) 1,000 mcg tablet Take by mouth daily       No current facility-administered medications on file prior to visit  No Known Allergies      Family History   Problem Relation Age of Onset   • Asthma Father    • Arthritis Father    • Cancer Mother    • Cervical cancer Mother    • Arthritis Mother                 Past Surgical History:   Procedure Laterality Date   • BARIATRIC SURGERY      x2 surgeries lap band and bypass   •  SECTION      x2   • EAR RECONSTRUCTION Left     age 4 skin drum skin graft   • HYSTERECTOMY             Social History     Socioeconomic History   • Marital status: Legally      Spouse name: Not on file   • Number of children: Not on file   • Years of education: Not on file   • Highest education level: Not on file   Occupational History   • Not on file   Tobacco Use   • Smoking status: Never     Passive exposure: Never   • Smokeless tobacco: Never   Vaping Use   • Vaping Use: Never used   Substance and Sexual Activity   • Alcohol use:  Yes     Alcohol/week: 1 0 standard drink of alcohol     Types: 1 Cans of beer per week     Comment: social   • Drug use: Yes     Types: Marijuana   • Sexual activity: Not on file   Other Topics Concern   • Not on file   Social History Narrative   • Not on file     Social Determinants of Health     Financial Resource Strain: Not on file   Food Insecurity: Not on file   Transportation Needs: Not on file   Physical Activity: Not on file   Stress: Not on file   Social Connections: Not on file   Intimate Partner Violence: Not on file   Housing Stability: Not on file       Review of Systems  Refer to positive review of systems in HPI  Constitutional- No fever  Eyes- No visual change  ENT- Hearing normal  CV- No chest pain  Resp- No Shortness of breath  GI- No diarrhea  - Bladder normal  MS- No Arthritis   Skin- No rash  Psych- No depression  Endo- No DM  Heme- No nodes    PHYSICAL EXAM:    Vitals:    23 1454   BP: 110/56   BP Location: Left arm   Patient Position: "Sitting   Cuff Size: Standard   Pulse: (!) 53   Temp: (!) 97 1 °F (36 2 °C)   TempSrc: Tympanic   SpO2: 98%   Weight: 78 kg (172 lb)   Height: 5' 3\" (1 6 m)         Appearance: No Acute Distress  Ophthalmoscopic: Disc Flat, Normal fundus  Carotid/Heart/Peripheral Vascular: No Bruits, RRR  Orientation: Awake, Alert, and Oriented x 3  Mental status:  Memory: Registation 3/3 Recall 3/3  Attention: Normal  Knowledge: Appropriate  Language: No aphasia  Speech: No dysarthria  Cranial Nerves:  2 No Visual Defect on Confrontation; Pupils round, equal, reactive to light  3,4,6 Extraocular Movements Intact; no nystagmus  5 Facial Sensation Intact  7 No facial asymmetry  8 Intact hearing  9,10 Palate symmetric, normal gag  11 Good shoulder shrug  12 Tongue Midline  Gait: Stable, No ataxia, can perform tandem walking  Coordination: No ataxia with finger to nose testing and heel to shin testing  Sensory: Intact, Symmetric to Pinprick, Light Touch, Vibration, and Joint Position  Muscle Tone: Normal  Muscle exam  Arm Right Left Leg Right Left   Deltoid 5/5 5/5 Iliopsoas 4/5 4/5   Biceps 5/5 5/5 Quads 5/5 5/5   Triceps 5/5 5/5 Hamstrings 5/5 5/5   Wrist Extension 5/5 5/5 Ankle Dorsi Flexion 5/5 5/5   Wrist Flexion 5/5 5/5 Ankle Plantar Flexion 5/5 5/5   Interossei 5/5 5/5 Ankle Eversion 5/5 5/5   APB 5/5 5/5 Ankle Inversion 5/5 5/5       Reflexes   RJ BJ TJ KJ AJ Plantars Ornelas's   Right 2+ 2+ 2+ 2+ 2+ Downgoing Not present   Left 2+ 2+ 2+ 2+ 2+ Downgoing Not present         Personal review of         MRI brain:  Mild white matter change within the frontal lobes is nonspecific for a patient of this age and differential considerations would include precocious chronic microangiopathic change, infectious/inflammatory etiology such as collagen vascular disorders or   chronic demyelinating disease    Although the pattern is not diagnostic of demyelinating disease, this remains within the differential   MRI cervical spine:   Cervical " degenerative change at the C5-6 level, left greater than right with disc and endplate/uncinate joint hypertrophic change resulting in left lateral canal stenosis and foraminal narrowing  Assessment/Plan:     1  Demyelinating disease ([address] )  MRI brain MS wo and w contrast    MRI orbits wo and w contrast      2  Double vision  Ambulatory Referral to Neurology    MRI brain MS wo and w contrast    MRI orbits wo and w contrast      3  Alteration in vision  Ambulatory Referral to Neurology    MRI brain MS wo and w contrast    MRI orbits wo and w contrast      4  Weakness of both legs  Sedimentation rate, automated    C-reactive protein    CK    Lyme Disease Serology w/Reflex    RF Screen w/ Reflex to Titer    Protein electrophoresis, serum    Protein electrophoresis, urine    Vitamin B12    Vitamin D 25 hydroxy    Sedimentation rate, automated    C-reactive protein    CK    Lyme Disease Serology w/Reflex    Protein electrophoresis, serum    Vitamin B12    Vitamin D 25 hydroxy    MRI lumbar spine without contrast      5  Chronic lower back pain  MRI lumbar spine without contrast          Patient's vision symptoms appear to be from intraocular pathology than from demyelination  Will get repeat MRI brain and orbits in 2 months  I would prefer 3T if she may  Will check for reversible causes of her proximal LE weakness  Will also get lumbar spine MRI to r/o DDD, stenosis  Counseling Documentation:  The patient and/or patient's family were  counseled regarding diagnostic results  Instructions for management,risk factor reductions,prognosis of disease were discussed  Patient and family were educated regarding impressions,risks and benefits of treatment options,importance of compliance with treatment  Total time of encounter: 70 min   More than 50% of time was spent in counseling and coordination of care of patient  [facility]  [address] [place] [address] [place]  [place]

## 2023-06-14 NOTE — PROGRESS NOTES
Outpatient Neurology History and Physical  Rey Andrews  [id number]  18 y o   1975          Consult: Yes    Purnima Anderson,       Chief Complaint   Patient presents with   • Alteration in vision   • Diplopia           History Obtained from: patient and sister     HPI:       Rey Andrews is a 51 yo F with PMH of hearing loss, presents with cc of vision disturbance  She reports onset of double vision sometime in Dec/Jan  She says it's more of blurred vision  She does report working on computer all day long and at the end of day, they get very tired  She had reported associated pain  Her PCP had ordered MRI brain MS protocol which revealed mild white matter change within the frontal lobes is nonspecific for a patient of this age and differential considerations would include precocious chronic microangiopathic change, infectious/inflammatory etiology such as collagen vascular disorders or chronic demyelinating disease although pattern was not diagnostic for demyelinating condition  She had cervical spine MRI that had revealed C5-6 left > right canal stenosis and foraminal narrowing  Denies any extremity paresthesia or weakness  She has h/o post portum cardiomyopathy  She has been seen by ophthalmology who reported astigmatism and wanted her to wear bifocals but she hasn't gotten around to ordering them  She has poor tinnitus  She's had problem with her ears since childhood age  She also reports a lot of lower back pain  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       Past Medical History:   Diagnosis Date   • Allergic    • Anxiety    • Asthma    • Cardiomyopathy ([address] )     bradycardia normal 50-55 bpm   • Depression    • Disease of thyroid gland    • Interstitial cystitis                Current Outpatient Medications on File Prior to Visit   Medication Sig Dispense Refill   • albuterol (Proventil HFA) 90 mcg/act inhaler Inhale 2 puffs 4 (four) times a day as needed for wheezing 1 g 0   • busPIRone (BUSPAR) 5 mg tablet Take 1 tablet (5 mg total) by mouth 2 (two) times a day 60 tablet 5   • cholecalciferol (VITAMIN D3) 25 mcg (1,000 units) tablet      • fluconazole (DIFLUCAN) 150 mg tablet if needed     • levothyroxine 200 mcg tablet Take 1 tablet by mouth once daily 90 tablet 0   • liothyronine (CYTOMEL) 5 mcg tablet Take 1 tablet (5 mcg total) by mouth daily 90 tablet 1   • lisinopril (ZESTRIL) 5 mg tablet Take 1 tablet (5 mg total) by mouth daily 90 tablet 1   • LORazepam (ATIVAN) 1 mg tablet Take 1 tablet (1 mg total) by mouth daily as needed for anxiety 30 tablet 0   • metoprolol succinate (TOPROL-XL) 25 mg 24 hr tablet Take 1 tablet (25 mg total) by mouth daily Takes half tab each dose 12 5mg 90 tablet 1   • montelukast (SINGULAIR) 10 mg tablet Take 1 tablet by mouth once daily 90 tablet 0   • Na Sulfate-K Sulfate-Mg Sulf 17 5-3 13-1 6 GM/177ML SOLN Take 1 kit by mouth once for 1 dose (Patient taking differently: Take 1 kit by mouth if needed) 354 mL 0   • omeprazole (PriLOSEC) 40 MG capsule Take 1 capsule (40 mg total) by mouth daily (Patient taking differently: Take 40 mg by mouth if needed) 30 capsule 2   • PARoxetine (PAXIL) 30 mg tablet Take 1 tablet (30 mg total) by mouth every morning     • traZODone (DESYREL) 50 mg tablet Take 1 tablet (50 mg total) by mouth daily 30 tablet 0   • vitamin B-12 (VITAMIN B-12) 1,000 mcg tablet Take by mouth daily       No current facility-administered medications on file prior to visit         No Known Allergies      Family History   Problem Relation Age of Onset   • Asthma Father    • Arthritis Father    • Cancer Mother    • Cervical cancer Mother    • Arthritis Mother                 Past Surgical History:   Procedure Laterality Date   • BARIATRIC SURGERY      x2 surgeries lap band and bypass   •  SECTION      x2   • EAR RECONSTRUCTION Left     age 4 skin drum skin graft   • HYSTERECTOMY             Social History "    Socioeconomic History   • Marital status: Legally      Spouse name: Not on file   • Number of children: Not on file   • Years of education: Not on file   • Highest education level: Not on file   Occupational History   • Not on file   Tobacco Use   • Smoking status: Never     Passive exposure: Never   • Smokeless tobacco: Never   Vaping Use   • Vaping Use: Never used   Substance and Sexual Activity   • Alcohol use:  Yes     Alcohol/week: 1 0 standard drink of alcohol     Types: 1 Cans of beer per week     Comment: social   • Drug use: Yes     Types: Marijuana   • Sexual activity: Not on file   Other Topics Concern   • Not on file   Social History Narrative   • Not on file     Social Determinants of Health     Financial Resource Strain: Not on file   Food Insecurity: Not on file   Transportation Needs: Not on file   Physical Activity: Not on file   Stress: Not on file   Social Connections: Not on file   Intimate Partner Violence: Not on file   Housing Stability: Not on file       Review of Systems  Refer to positive review of systems in HPI  Constitutional- No fever  Eyes- No visual change  ENT- Hearing normal  CV- No chest pain  Resp- No Shortness of breath  GI- No diarrhea  - Bladder normal  MS- No Arthritis   Skin- No rash  Psych- No depression  Endo- No DM  Heme- No nodes    PHYSICAL EXAM:    Vitals:    06/14/23 1454   BP: 110/56   BP Location: Left arm   Patient Position: Sitting   Cuff Size: Standard   Pulse: (!) 53   Temp: (!) 97 1 °F (36 2 °C)   TempSrc: Tympanic   SpO2: 98%   Weight: 78 kg (172 lb)   Height: 5' 3\" (1 6 m)         Appearance: No Acute Distress  Ophthalmoscopic: Disc Flat, Normal fundus  Carotid/Heart/Peripheral Vascular: No Bruits, RRR  Orientation: Awake, Alert, and Oriented x 3  Mental status:  Memory: Registation 3/3 Recall 3/3  Attention: Normal  Knowledge: Appropriate  Language: No aphasia  Speech: No dysarthria  Cranial Nerves:  2 No Visual Defect on Confrontation; Pupils " round, equal, reactive to light  3,4,6 Extraocular Movements Intact; no nystagmus  5 Facial Sensation Intact  7 No facial asymmetry  8 Intact hearing  9,10 Palate symmetric, normal gag  11 Good shoulder shrug  12 Tongue Midline  Gait: Stable, No ataxia, can perform tandem walking  Coordination: No ataxia with finger to nose testing and heel to shin testing  Sensory: Intact, Symmetric to Pinprick, Light Touch, Vibration, and Joint Position  Muscle Tone: Normal  Muscle exam  Arm Right Left Leg Right Left   Deltoid 5/5 5/5 Iliopsoas 4/5 4/5   Biceps 5/5 5/5 Quads 5/5 5/5   Triceps 5/5 5/5 Hamstrings 5/5 5/5   Wrist Extension 5/5 5/5 Ankle Dorsi Flexion 5/5 5/5   Wrist Flexion 5/5 5/5 Ankle Plantar Flexion 5/5 5/5   Interossei 5/5 5/5 Ankle Eversion 5/5 5/5   APB 5/5 5/5 Ankle Inversion 5/5 5/5       Reflexes   RJ BJ TJ KJ AJ Plantars Ornelas's   Right 2+ 2+ 2+ 2+ 2+ Downgoing Not present   Left 2+ 2+ 2+ 2+ 2+ Downgoing Not present         Personal review of         MRI brain:  Mild white matter change within the frontal lobes is nonspecific for a patient of this age and differential considerations would include precocious chronic microangiopathic change, infectious/inflammatory etiology such as collagen vascular disorders or   chronic demyelinating disease  Although the pattern is not diagnostic of demyelinating disease, this remains within the differential   MRI cervical spine:   Cervical degenerative change at the C5-6 level, left greater than right with disc and endplate/uncinate joint hypertrophic change resulting in left lateral canal stenosis and foraminal narrowing  Assessment/Plan:     1  Demyelinating disease ([address] )  MRI brain MS wo and w contrast    MRI orbits wo and w contrast      2  Double vision  Ambulatory Referral to Neurology    MRI brain MS wo and w contrast    MRI orbits wo and w contrast      3   Alteration in vision  Ambulatory Referral to Neurology    MRI brain MS wo and w contrast    MRI orbits wo and w contrast      4  Weakness of both legs  Sedimentation rate, automated    C-reactive protein    CK    Lyme Disease Serology w/Reflex    RF Screen w/ Reflex to Titer    Protein electrophoresis, serum    Protein electrophoresis, urine    Vitamin B12    Vitamin D 25 hydroxy    Sedimentation rate, automated    C-reactive protein    CK    Lyme Disease Serology w/Reflex    Protein electrophoresis, serum    Vitamin B12    Vitamin D 25 hydroxy    MRI lumbar spine without contrast      5  Chronic lower back pain  MRI lumbar spine without contrast          Patient's vision symptoms appear to be from intraocular pathology than from demyelination  Will get repeat MRI brain and orbits in 2 months  I would prefer 3T if she may  Will check for reversible causes of her proximal LE weakness  Will also get lumbar spine MRI to r/o DDD, stenosis  Counseling Documentation:  The patient and/or patient's family were  counseled regarding diagnostic results  Instructions for management,risk factor reductions,prognosis of disease were discussed  Patient and family were educated regarding impressions,risks and benefits of treatment options,importance of compliance with treatment  Total time of encounter: 70 min   More than 50% of time was spent in counseling and coordination of care of patient  SOURAV Morales [facility]St[facility]  [id number]  [address]

## 2023-06-15 ENCOUNTER — TELEPHONE (OUTPATIENT)
Dept: FAMILY MEDICINE CLINIC | Facility: CLINIC | Age: 48
End: 2023-06-15

## 2023-06-15 DIAGNOSIS — N89.8 VAGINAL DISCHARGE: Primary | ICD-10-CM

## 2023-06-15 RX ORDER — METRONIDAZOLE 500 MG/1
500 TABLET ORAL EVERY 12 HOURS SCHEDULED
Qty: 28 TABLET | Refills: 0 | Status: SHIPPED | OUTPATIENT
Start: 2023-06-15 | End: 2023-06-27

## 2023-06-15 NOTE — TELEPHONE ENCOUNTER
----- Message from Jeff Carpenter Lynette Wilks sent at 6/12/2023  2:54 PM EDT -----  Regarding: FW: Melissa  Contact: [phone]  Patient informed and stated that she would rather wait for Dr Cici Valenzuela because he know that this is a chronic condition   ----- Message -----  From: JERONIMO Goff  Sent: 6/12/2023   2:09 PM EDT  To: [facility]  Subject: FW: Diflucan                                     She should be seen  Angus Collazo    ----- Message -----  From: Kyle Fields MA  Sent: 6/12/2023   9:23 AM EDT  To: JERONIMO Goff  Subject: FW: Diflucan                                     Please advise in Dr Julia Fields    ----- Message -----  From: Brittaney Junior  Sent: 6/11/2023   9:26 PM EDT  To: [facility]  Subject: Diflucan                                         Good evening, the diflucan helped short term, discharge is darker and odor    I might have had sex too soon and I think I have the bacterial infection  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 I can't go into [place]  I used to see one in [facility]  Please let me know  ty as always

## 2023-06-16 NOTE — TELEPHONE ENCOUNTER
Patient informed and expressed understanding  She already scheduled an appointment with her gyn  No further action    Shelby Jaffe, MOOSE

## 2023-06-21 DIAGNOSIS — E03.9 HYPOTHYROIDISM, UNSPECIFIED TYPE: ICD-10-CM

## 2023-06-21 DIAGNOSIS — F41.9 ANXIETY: ICD-10-CM

## 2023-06-21 RX ORDER — LIOTHYRONINE SODIUM 5 UG/1
5 TABLET ORAL DAILY
Qty: 90 TABLET | Refills: 0 | Status: SHIPPED | OUTPATIENT
Start: 2023-06-21

## 2023-06-21 RX ORDER — TRAZODONE HYDROCHLORIDE 50 MG/1
50 TABLET ORAL DAILY
Qty: 30 TABLET | Refills: 0 | Status: SHIPPED | OUTPATIENT
Start: 2023-06-21

## 2023-06-26 ENCOUNTER — HOSPITAL ENCOUNTER (OUTPATIENT)
Facility: HOSPITAL | Age: 48
Setting detail: OBSERVATION
Discharge: HOME | End: 2023-06-27
Attending: EMERGENCY MEDICINE | Admitting: STUDENT IN AN ORGANIZED HEALTH CARE EDUCATION/TRAINING PROGRAM
Payer: COMMERCIAL

## 2023-06-26 ENCOUNTER — APPOINTMENT (EMERGENCY)
Dept: RADIOLOGY | Facility: HOSPITAL | Age: 48
End: 2023-06-26
Payer: COMMERCIAL

## 2023-06-26 ENCOUNTER — APPOINTMENT (EMERGENCY)
Dept: NON INVASIVE DIAGNOSTICS | Facility: HOSPITAL | Age: 48
End: 2023-06-26
Payer: COMMERCIAL

## 2023-06-26 DIAGNOSIS — D64.9 ANEMIA: ICD-10-CM

## 2023-06-26 DIAGNOSIS — R07.9 CHEST PAIN: ICD-10-CM

## 2023-06-26 DIAGNOSIS — R00.1 SYMPTOMATIC BRADYCARDIA: ICD-10-CM

## 2023-06-26 DIAGNOSIS — R55 SYNCOPE AND COLLAPSE: Primary | ICD-10-CM

## 2023-06-26 LAB
2HR DELTA HS TROPONIN: 0 NG/L
4HR DELTA HS TROPONIN: 2 NG/L
ALBUMIN SERPL BCP-MCNC: 4.1 G/DL (ref 3.5–5)
ALP SERPL-CCNC: 77 U/L (ref 34–104)
ALT SERPL W P-5'-P-CCNC: 17 U/L (ref 7–52)
ANION GAP SERPL CALCULATED.3IONS-SCNC: 5 MMOL/L
APTT PPP: 29 SECONDS (ref 23–37)
AST SERPL W P-5'-P-CCNC: 30 U/L (ref 13–39)
ATRIAL RATE: 42 BPM
BASOPHILS # BLD AUTO: 0.03 THOUSANDS/ÂΜL (ref 0–0.1)
BASOPHILS NFR BLD AUTO: 1 % (ref 0–1)
BILIRUB SERPL-MCNC: 0.83 MG/DL (ref 0.2–1)
BNP SERPL-MCNC: 57 PG/ML (ref 0–100)
BUN SERPL-MCNC: 14 MG/DL (ref 5–25)
CALCIUM SERPL-MCNC: 8.9 MG/DL (ref 8.4–10.2)
CARDIAC TROPONIN I PNL SERPL HS: 4 NG/L
CARDIAC TROPONIN I PNL SERPL HS: 4 NG/L
CARDIAC TROPONIN I PNL SERPL HS: 6 NG/L
CHLORIDE SERPL-SCNC: 108 MMOL/L (ref 96–108)
CO2 SERPL-SCNC: 24 MMOL/L (ref 21–32)
CREAT SERPL-MCNC: 0.74 MG/DL (ref 0.6–1.3)
EOSINOPHIL # BLD AUTO: 0.13 THOUSAND/ÂΜL (ref 0–0.61)
EOSINOPHIL NFR BLD AUTO: 2 % (ref 0–6)
ERYTHROCYTE [DISTWIDTH] IN BLOOD BY AUTOMATED COUNT: 14.1 % (ref 11.6–15.1)
GFR SERPL CREATININE-BSD FRML MDRD: 95 ML/MIN/1.73SQ M
GLUCOSE SERPL-MCNC: 90 MG/DL (ref 65–140)
HCT VFR BLD AUTO: 34.8 % (ref 34.8–46.1)
HGB BLD-MCNC: 11.2 G/DL (ref 11.5–15.4)
IMM GRANULOCYTES # BLD AUTO: 0.02 THOUSAND/UL (ref 0–0.2)
IMM GRANULOCYTES NFR BLD AUTO: 0 % (ref 0–2)
INR PPP: 1.08 (ref 0.84–1.19)
LYMPHOCYTES # BLD AUTO: 1.96 THOUSANDS/ÂΜL (ref 0.6–4.47)
LYMPHOCYTES NFR BLD AUTO: 33 % (ref 14–44)
MCH RBC QN AUTO: 28.9 PG (ref 26.8–34.3)
MCHC RBC AUTO-ENTMCNC: 32.2 G/DL (ref 31.4–37.4)
MCV RBC AUTO: 90 FL (ref 82–98)
MONOCYTES # BLD AUTO: 0.84 THOUSAND/ÂΜL (ref 0.17–1.22)
MONOCYTES NFR BLD AUTO: 14 % (ref 4–12)
NEUTROPHILS # BLD AUTO: 2.89 THOUSANDS/ÂΜL (ref 1.85–7.62)
NEUTS SEG NFR BLD AUTO: 50 % (ref 43–75)
NRBC BLD AUTO-RTO: 0 /100 WBCS
P AXIS: 47 DEGREES
PLATELET # BLD AUTO: 183 THOUSANDS/UL (ref 149–390)
PMV BLD AUTO: 11.6 FL (ref 8.9–12.7)
POTASSIUM SERPL-SCNC: 4.4 MMOL/L (ref 3.5–5.3)
PR INTERVAL: 178 MS
PROT SERPL-MCNC: 6.9 G/DL (ref 6.4–8.4)
PROTHROMBIN TIME: 14.1 SECONDS (ref 11.6–14.5)
QRS AXIS: 45 DEGREES
QRSD INTERVAL: 90 MS
QT INTERVAL: 494 MS
QTC INTERVAL: 412 MS
RBC # BLD AUTO: 3.88 MILLION/UL (ref 3.81–5.12)
SODIUM SERPL-SCNC: 137 MMOL/L (ref 135–147)
T WAVE AXIS: 27 DEGREES
TSH SERPL DL<=0.05 MIU/L-ACNC: 1.75 UIU/ML (ref 0.45–4.5)
VENTRICULAR RATE: 42 BPM
WBC # BLD AUTO: 5.87 THOUSAND/UL (ref 4.31–10.16)

## 2023-06-26 PROCEDURE — 93306 TTE W/DOPPLER COMPLETE: CPT

## 2023-06-26 PROCEDURE — 85025 COMPLETE CBC W/AUTO DIFF WBC: CPT | Performed by: PHYSICIAN ASSISTANT

## 2023-06-26 PROCEDURE — 93005 ELECTROCARDIOGRAM TRACING: CPT

## 2023-06-26 PROCEDURE — 96361 HYDRATE IV INFUSION ADD-ON: CPT

## 2023-06-26 PROCEDURE — 80053 COMPREHEN METABOLIC PANEL: CPT | Performed by: PHYSICIAN ASSISTANT

## 2023-06-26 PROCEDURE — 36415 COLL VENOUS BLD VENIPUNCTURE: CPT | Performed by: PHYSICIAN ASSISTANT

## 2023-06-26 PROCEDURE — 85610 PROTHROMBIN TIME: CPT | Performed by: PHYSICIAN ASSISTANT

## 2023-06-26 PROCEDURE — 93010 ELECTROCARDIOGRAM REPORT: CPT | Performed by: INTERNAL MEDICINE

## 2023-06-26 PROCEDURE — 83880 ASSAY OF NATRIURETIC PEPTIDE: CPT | Performed by: PHYSICIAN ASSISTANT

## 2023-06-26 PROCEDURE — 70450 CT HEAD/BRAIN W/O DYE: CPT

## 2023-06-26 PROCEDURE — G1004 CDSM NDSC: HCPCS

## 2023-06-26 PROCEDURE — 84443 ASSAY THYROID STIM HORMONE: CPT | Performed by: PHYSICIAN ASSISTANT

## 2023-06-26 PROCEDURE — 84484 ASSAY OF TROPONIN QUANT: CPT | Performed by: PHYSICIAN ASSISTANT

## 2023-06-26 PROCEDURE — 85730 THROMBOPLASTIN TIME PARTIAL: CPT | Performed by: PHYSICIAN ASSISTANT

## 2023-06-26 PROCEDURE — 99285 EMERGENCY DEPT VISIT HI MDM: CPT

## 2023-06-26 PROCEDURE — 71045 X-RAY EXAM CHEST 1 VIEW: CPT

## 2023-06-26 PROCEDURE — 99215 OFFICE O/P EST HI 40 MIN: CPT | Performed by: INTERNAL MEDICINE

## 2023-06-26 PROCEDURE — 96360 HYDRATION IV INFUSION INIT: CPT

## 2023-06-26 PROCEDURE — 99223 1ST HOSP IP/OBS HIGH 75: CPT | Performed by: STUDENT IN AN ORGANIZED HEALTH CARE EDUCATION/TRAINING PROGRAM

## 2023-06-26 RX ORDER — LIOTHYRONINE SODIUM 5 UG/1
5 TABLET ORAL DAILY
Status: DISCONTINUED | OUTPATIENT
Start: 2023-06-27 | End: 2023-06-27 | Stop reason: HOSPADM

## 2023-06-26 RX ORDER — BUSPIRONE HYDROCHLORIDE 5 MG/1
5 TABLET ORAL 2 TIMES DAILY
Status: DISCONTINUED | OUTPATIENT
Start: 2023-06-26 | End: 2023-06-27 | Stop reason: HOSPADM

## 2023-06-26 RX ORDER — ENOXAPARIN SODIUM 100 MG/ML
40 INJECTION SUBCUTANEOUS DAILY
Status: DISCONTINUED | OUTPATIENT
Start: 2023-06-27 | End: 2023-06-27 | Stop reason: HOSPADM

## 2023-06-26 RX ORDER — DIPHENHYDRAMINE HYDROCHLORIDE 50 MG/ML
25 INJECTION INTRAMUSCULAR; INTRAVENOUS EVERY 8 HOURS PRN
Status: DISCONTINUED | OUTPATIENT
Start: 2023-06-26 | End: 2023-06-27 | Stop reason: HOSPADM

## 2023-06-26 RX ORDER — LANOLIN ALCOHOL/MO/W.PET/CERES
6 CREAM (GRAM) TOPICAL
Status: DISCONTINUED | OUTPATIENT
Start: 2023-06-26 | End: 2023-06-27 | Stop reason: HOSPADM

## 2023-06-26 RX ORDER — MONTELUKAST SODIUM 10 MG/1
10 TABLET ORAL
Status: DISCONTINUED | OUTPATIENT
Start: 2023-06-26 | End: 2023-06-27 | Stop reason: HOSPADM

## 2023-06-26 RX ORDER — KETOROLAC TROMETHAMINE 30 MG/ML
15 INJECTION, SOLUTION INTRAMUSCULAR; INTRAVENOUS EVERY 8 HOURS
Status: DISPENSED | OUTPATIENT
Start: 2023-06-26 | End: 2023-06-27

## 2023-06-26 RX ORDER — LEVOTHYROXINE SODIUM 0.1 MG/1
200 TABLET ORAL
Status: DISCONTINUED | OUTPATIENT
Start: 2023-06-27 | End: 2023-06-27 | Stop reason: HOSPADM

## 2023-06-26 RX ORDER — ALBUTEROL SULFATE 90 UG/1
2 AEROSOL, METERED RESPIRATORY (INHALATION) 4 TIMES DAILY PRN
Status: DISCONTINUED | OUTPATIENT
Start: 2023-06-26 | End: 2023-06-27 | Stop reason: HOSPADM

## 2023-06-26 RX ORDER — KETOROLAC TROMETHAMINE 30 MG/ML
15 INJECTION, SOLUTION INTRAMUSCULAR; INTRAVENOUS ONCE
Status: COMPLETED | OUTPATIENT
Start: 2023-06-26 | End: 2023-06-26

## 2023-06-26 RX ORDER — LISINOPRIL 5 MG/1
5 TABLET ORAL DAILY
Status: DISCONTINUED | OUTPATIENT
Start: 2023-06-27 | End: 2023-06-27 | Stop reason: HOSPADM

## 2023-06-26 RX ORDER — METOCLOPRAMIDE HYDROCHLORIDE 5 MG/ML
5 INJECTION INTRAMUSCULAR; INTRAVENOUS EVERY 8 HOURS SCHEDULED
Status: DISCONTINUED | OUTPATIENT
Start: 2023-06-26 | End: 2023-06-27 | Stop reason: HOSPADM

## 2023-06-26 RX ADMIN — MONTELUKAST 10 MG: 10 TABLET, FILM COATED ORAL at 21:10

## 2023-06-26 RX ADMIN — Medication 6 MG: at 21:35

## 2023-06-26 RX ADMIN — SODIUM CHLORIDE 1000 ML: 0.9 INJECTION, SOLUTION INTRAVENOUS at 12:05

## 2023-06-26 RX ADMIN — DIPHENHYDRAMINE HYDROCHLORIDE 25 MG: 50 INJECTION, SOLUTION INTRAMUSCULAR; INTRAVENOUS at 22:52

## 2023-06-26 RX ADMIN — BUSPIRONE HYDROCHLORIDE 5 MG: 5 TABLET ORAL at 17:01

## 2023-06-26 RX ADMIN — KETOROLAC TROMETHAMINE 15 MG: 30 INJECTION, SOLUTION INTRAMUSCULAR at 16:32

## 2023-06-26 RX ADMIN — KETOROLAC TROMETHAMINE 15 MG: 30 INJECTION, SOLUTION INTRAMUSCULAR at 21:35

## 2023-06-26 NOTE — ED PROVIDER NOTES
History  Chief Complaint   Patient presents with   • Chest Pain     Patient states that when she woke up mthis morning she had an episode of syncope and presently has a chest pain, Hx of cardiomyopathy  Parentsdied of both heart attack which worries her     51 y/o female, h/o cardiomyopathy/demyelinating disease, presenting today after a syncopal episode that occurred prior to arrival  Relays she believes she was getting up to go get something to eat when she felt lightheaded, next thing she knew she was waking up on the ground, believes that she lost consciousness  Patient was somewhat symptomatic prior to syncopal event however initially could not remember exact situation surrounding syncope  Currently has chest pain, she is unsure if this started before after she syncopized  Currently has midsternal chest pain  Concerned as she has a significant family history of cardiac related deaths  She currently is being evaluated by neurology for demyelinating disorder but also seems to run in the family  Notes a headache  Occasional blurry vision which has been ongoing  Patient states that she typically has a low heart rate, as recorded in past notes typically between 48 and 55, today she is in the high 30s to low 50s, does not become tachy  Denies diaphoresis, palpitations, numbness, paresthesias, double vision, abdominal pain, flank pain, recent illnesses, nausea, vomiting, diarrhea  Differential includes but is not limited to ACS, orthostatic hypotension, electrolyte abnormality, dehydration or cranial hemorrhage  Prior to Admission Medications   Prescriptions Last Dose Informant Patient Reported? Taking?    LORazepam (ATIVAN) 1 mg tablet   No No   Sig: Take 1 tablet (1 mg total) by mouth daily as needed for anxiety   Na Sulfate-K Sulfate-Mg Sulf 17 5-3 13-1 6 GM/177ML SOLN   No No   Sig: Take 1 kit by mouth once for 1 dose   Patient taking differently: Take 1 kit by mouth if needed   PARoxetine (PAXIL) 30 mg tablet  Self No No   Sig: Take 1 tablet (30 mg total) by mouth every morning   albuterol (Proventil HFA) 90 mcg/act inhaler  Self No No   Sig: Inhale 2 puffs 4 (four) times a day as needed for wheezing   busPIRone (BUSPAR) 5 mg tablet  Self No No   Sig: Take 1 tablet (5 mg total) by mouth 2 (two) times a day   cholecalciferol (VITAMIN D3) 25 mcg (1,000 units) tablet  Self Yes No   fluconazole (DIFLUCAN) 150 mg tablet   Yes No   Sig: if needed   levothyroxine 200 mcg tablet   No No   Sig: Take 1 tablet by mouth once daily   liothyronine (CYTOMEL) 5 mcg tablet   No No   Sig: Take 1 tablet (5 mcg total) by mouth daily   lisinopril (ZESTRIL) 5 mg tablet  Self No No   Sig: Take 1 tablet (5 mg total) by mouth daily   metoprolol succinate (TOPROL-XL) 25 mg 24 hr tablet  Self No No   Sig: Take 1 tablet (25 mg total) by mouth daily Takes half tab each dose 12 5mg   metroNIDAZOLE (FLAGYL) 500 mg tablet   No No   Sig: Take 1 tablet (500 mg total) by mouth every 12 (twelve) hours for 14 days   montelukast (SINGULAIR) 10 mg tablet   No No   Sig: Take 1 tablet by mouth once daily   omeprazole (PriLOSEC) 40 MG capsule  Self No No   Sig: Take 1 capsule (40 mg total) by mouth daily   Patient taking differently: Take 40 mg by mouth if needed   traZODone (DESYREL) 50 mg tablet   No No   Sig: Take 1 tablet (50 mg total) by mouth daily   vitamin B-12 (VITAMIN B-12) 1,000 mcg tablet  Self Yes No   Sig: Take by mouth daily      Facility-Administered Medications: None       Past Medical History:   Diagnosis Date   • Allergic    • Anxiety    • Asthma    • Cardiomyopathy (HCC)     bradycardia normal 50-55 bpm   • Depression    • Disease of thyroid gland    • Interstitial cystitis        Past Surgical History:   Procedure Laterality Date   • BARIATRIC SURGERY      x2 surgeries lap band and bypass   •  SECTION      x2   • EAR RECONSTRUCTION Left     age 4 skin drum skin graft   • HYSTERECTOMY         Family History   Problem Relation Age of Onset   • Asthma Father    • Arthritis Father    • Cancer Mother    • Cervical cancer Mother    • Arthritis Mother      I have reviewed and agree with the history as documented  E-Cigarette/Vaping   • E-Cigarette Use Never User      E-Cigarette/Vaping Substances   • Nicotine No    • THC No    • CBD No    • Flavoring No    • Other No    • Unknown No      Social History     Tobacco Use   • Smoking status: Never     Passive exposure: Never   • Smokeless tobacco: Never   Vaping Use   • Vaping Use: Never used   Substance Use Topics   • Alcohol use: Yes     Alcohol/week: 1 0 standard drink of alcohol     Types: 1 Cans of beer per week     Comment: social   • Drug use: Yes     Types: Marijuana       Review of Systems   Constitutional: Negative  Negative for chills, fatigue and fever  HENT: Negative  Negative for congestion, postnasal drip, rhinorrhea and sore throat  Eyes: Negative  Respiratory: Negative  Negative for cough, shortness of breath and wheezing  Cardiovascular: Positive for chest pain  Negative for palpitations and leg swelling  Gastrointestinal: Negative  Negative for abdominal pain, diarrhea, nausea and vomiting  Endocrine: Negative  Genitourinary: Negative  Musculoskeletal: Negative  Skin: Negative  Neurological: Positive for syncope and headaches  Negative for dizziness, tremors, seizures, facial asymmetry, speech difficulty, weakness, light-headedness and numbness  Hematological: Negative  Psychiatric/Behavioral: Negative  All other systems reviewed and are negative  Physical Exam  Physical Exam  Vitals and nursing note reviewed  Constitutional:       General: She is not in acute distress  Appearance: Normal appearance  She is well-developed and normal weight  She is not diaphoretic  HENT:      Head: Normocephalic and atraumatic        Right Ear: External ear normal       Left Ear: External ear normal       Nose: Nose normal  Mouth/Throat:      Pharynx: No oropharyngeal exudate  Eyes:      General: No scleral icterus  Right eye: No discharge  Left eye: No discharge  Conjunctiva/sclera: Conjunctivae normal       Pupils: Pupils are equal, round, and reactive to light  Cardiovascular:      Rate and Rhythm: Regular rhythm  Bradycardia present  Heart sounds: Normal heart sounds  No murmur heard  No friction rub  No gallop  Pulmonary:      Effort: Pulmonary effort is normal  No respiratory distress  Breath sounds: Normal breath sounds  No stridor  No wheezing, rhonchi or rales  Chest:      Chest wall: No tenderness  Abdominal:      General: Abdomen is flat  Bowel sounds are normal  There is no distension  Palpations: Abdomen is soft  There is no mass  Tenderness: There is no abdominal tenderness  There is no guarding or rebound  Hernia: No hernia is present  Musculoskeletal:      Cervical back: Normal range of motion and neck supple  Right lower leg: No edema  Left lower leg: No edema  Lymphadenopathy:      Cervical: No cervical adenopathy  Skin:     General: Skin is warm and dry  Capillary Refill: Capillary refill takes less than 2 seconds  Coloration: Skin is not pale  Findings: No erythema or rash  Neurological:      General: No focal deficit present  Mental Status: She is alert and oriented to person, place, and time  Mental status is at baseline           Vital Signs  ED Triage Vitals [06/26/23 1112]   Temperature Pulse Respirations Blood Pressure SpO2   98 5 °F (36 9 °C) (!) 44 18 145/66 100 %      Temp Source Heart Rate Source Patient Position - Orthostatic VS BP Location FiO2 (%)   Oral Monitor Lying Right arm --      Pain Score       7           Vitals:    06/26/23 1222 06/26/23 1224 06/26/23 1315 06/26/23 1400   BP: 116/57 123/61  119/51   Pulse: (!) 54 (!) 52 (!) 50 (!) 40   Patient Position - Orthostatic VS: Sitting - Orthostatic VS Standing - Orthostatic VS           Visual Acuity      ED Medications  Medications   ketorolac (TORADOL) injection 15 mg (has no administration in time range)   sodium chloride 0 9 % bolus 1,000 mL (1,000 mL Intravenous New Bag 6/26/23 1205)       Diagnostic Studies  Results Reviewed     Procedure Component Value Units Date/Time    HS Troponin I 2hr [[id number]]  (Normal) Collected: 06/26/23 1359    Lab Status: Final result Specimen: Blood from Line, Venous Updated: 06/26/23 1439     hs TnI 2hr 4 ng/L      Delta 2hr hsTnI 0 ng/L     TSH, 3rd generation with Free T4 reflex [[id number]]  (Normal) Collected: 06/26/23 1151    Lab Status: Final result Specimen: Blood from Line, Venous Updated: 06/26/23 1234     TSH 3RD GENERATON 1 748 uIU/mL     HS Troponin I 4hr [[id number]]     Lab Status: No result Specimen: Blood     HS Troponin 0hr (reflex protocol) [[id number]]  (Normal) Collected: 06/26/23 1151    Lab Status: Final result Specimen: Blood from Line, Venous Updated: 06/26/23 1225     hs TnI 0hr 4 ng/L     B-Type Natriuretic Peptide(BNP) [[id number]]  (Normal) Collected: 06/26/23 1151    Lab Status: Final result Specimen: Blood from Line, Venous Updated: 06/26/23 1224     BNP 57 pg/mL     Comprehensive metabolic panel [[id number]] Collected: 06/26/23 1151    Lab Status: Final result Specimen: Blood from Line, Venous Updated: 06/26/23 1216     Sodium 137 mmol/L      Potassium 4 4 mmol/L      Chloride 108 mmol/L      CO2 24 mmol/L      ANION GAP 5 mmol/L      BUN 14 mg/dL      Creatinine 0 74 mg/dL      Glucose 90 mg/dL      Calcium 8 9 mg/dL      AST 30 U/L      ALT 17 U/L      Alkaline Phosphatase 77 U/L      Total Protein 6 9 g/dL      Albumin 4 1 g/dL      Total Bilirubin 0 83 mg/dL      eGFR 95 ml/min/1 73sq m     Narrative:      Atif guidelines for Chronic Kidney Disease (CKD):   •  Stage 1 with normal or high GFR (GFR > 90 mL/min/1 73 square meters)  •  Stage 2 Mild CKD (GFR = 60-89 mL/min/1 73 square meters)  •  Stage 3A Moderate CKD (GFR = 45-59 mL/min/1 73 square meters)  •  Stage 3B Moderate CKD (GFR = 30-44 mL/min/1 73 square meters)  •  Stage 4 Severe CKD (GFR = 15-29 mL/min/1 73 square meters)  •  Stage 5 End Stage CKD (GFR <15 mL/min/1 73 square meters)  Note: GFR calculation is accurate only with a steady state creatinine    Protime-INR [[id number]]  (Normal) Collected: 06/26/23 1151    Lab Status: Final result Specimen: Blood from Line, Venous Updated: 06/26/23 1211     Protime 14 1 seconds      INR 1 08    APTT [[id number]]  (Normal) Collected: 06/26/23 1151    Lab Status: Final result Specimen: Blood from Line, Venous Updated: 06/26/23 1211     PTT 29 seconds     CBC and differential [[id number]]  (Abnormal) Collected: 06/26/23 1151    Lab Status: Final result Specimen: Blood from Line, Venous Updated: 06/26/23 1159     WBC 5 87 Thousand/uL      RBC 3 88 Million/uL      Hemoglobin 11 2 g/dL      Hematocrit 34 8 %      MCV 90 fL      MCH 28 9 pg      MCHC 32 2 g/dL      RDW 14 1 %      MPV 11 6 fL      Platelets 360 Thousands/uL      nRBC 0 /100 WBCs      Neutrophils Relative 50 %      Immat GRANS % 0 %      Lymphocytes Relative 33 %      Monocytes Relative 14 %      Eosinophils Relative 2 %      Basophils Relative 1 %      Neutrophils Absolute 2 89 Thousands/µL      Immature Grans Absolute 0 02 Thousand/uL      Lymphocytes Absolute 1 96 Thousands/µL      Monocytes Absolute 0 84 Thousand/µL      Eosinophils Absolute 0 13 Thousand/µL      Basophils Absolute 0 03 Thousands/µL                  XR chest 1 view portable   Final Result by Johana Navarro MD (06/26 1432)      No acute cardiopulmonary disease  Workstation performed: [id number]         CT head without contrast   Final Result by Mariano Raphael MD (06/26 1303)      No acute intracranial abnormality                    Workstation performed: [id number]                    Procedures  Procedures         ED Course  ED Course as of 06/26/23 1543   Mon Jun 26, 2023   1323 3400 [facility] cardiology on call Dr Shai Bernal  Patient updated, looking to see if we may obtain echo today  1330 Getting an echo after discussing with Dr Shai Bernal   [phone] Updated patient  Agreeable to admission  Discussed echo results with Dr Shai Bernal who suggests obs tele with treadmill stress in AM               HEART Risk Score    Flowsheet Row Most Recent Value   Heart Score Risk Calculator    History 0 Filed at: 06/26/2023 1532   ECG 0 Filed at: 06/26/2023 1532   Age 1 Filed at: 06/26/2023 1532   Risk Factors 2 Filed at: 06/26/2023 1532   Troponin 0 Filed at: 06/26/2023 1532   HEART Score 3 Filed at: 06/26/2023 1532                        SBIRT 22yo+    Flowsheet Row Most Recent Value   Initial Alcohol Screen: US AUDIT-C     1  How often do you have a drink containing alcohol? 2 Filed at: 06/26/2023 1114   3b  FEMALE Any Age, or MALE 65+: How often do you have 4 or more drinks on one occassion? 2 Filed at: 06/26/2023 1114   Audit-C Score 4 Filed at: 06/26/2023 1114   MENDY: How many times in the past year have you    Used an illegal drug or used a prescription medication for non-medical reasons? Never Filed at: 06/26/2023 1114                    Medical Decision Making  Patient negative for orthostatics  Persistently bradycardic here in the emergency department, does not wax and wane, typically staying between high 30s to low 50s  Continues with headache  Discussed case with Dr Shai Bernal who agrees to echo at this point in time and suggests admission for obs telemetry, stress test potentially in the morning  Patient did have some lightheadedness prior to syncopizing and however syncopal episode seemed to be somewhat sudden, she cannot recall the events very clearly initially  No seizure like activity  Hg of 11 2 low today compared to previous  Will admit for observation  Patient very comfortable with this plan      Anemia: acute illness or injury  Chest pain: acute illness or injury  Symptomatic bradycardia: acute illness or injury  Syncope and collapse: acute illness or injury  Amount and/or Complexity of Data Reviewed  Labs: ordered  Radiology: ordered  Risk  Prescription drug management  Decision regarding hospitalization  Disposition  Final diagnoses:   Syncope and collapse   Chest pain   Anemia   Symptomatic bradycardia     Time reflects when diagnosis was documented in both MDM as applicable and the Disposition within this note     Time User Action Codes Description Comment    6/26/2023  3:31 PM Delpha Minion Add [R55] Syncope and collapse     6/26/2023  3:38 PM Delpha Minion Add [R07 9] Chest pain     6/26/2023  3:38 PM Delpha Minion Add [D64 9] Anemia     6/26/2023  3:39 PM Delpha Minion Add [R00 1] Symptomatic bradycardia       ED Disposition     ED Disposition   Admit    Condition   Stable    Date/Time   Mon Jun 26, 2023  3:40 PM    Comment   Case was discussed with Dr Leora Beltran and the patient's admission status was agreed to be Admission Status: observation status to the service of Dr Savanna Lujan    None         Patient's Medications   Discharge Prescriptions    No medications on file       No discharge procedures on file      PDMP Review     None          ED Provider  Electronically Signed by           Khalif Rosen PA-C  06/26/23 4918

## 2023-06-26 NOTE — ASSESSMENT & PLAN NOTE
Patient presented to ED after syncopal episode this morning  She reports she was nauseous and fainted for an unknown amount of time  When she woke up, she had chest pressure and a headache    · History of peripartum cardiomyopathy and bradycardia  · EKG on admission showed sinus bradycardia HR 42, has remained bradycardic since admission  · CT head and CXR negative  · troponins negative  · Repeat ECHO pending  · Orthostatic vitals negative  · Continue to monitor on telemetry  · Hold pta metoprolol  · Cardiology consulted  · Plan for nuclear stress test tomorrow

## 2023-06-26 NOTE — PLAN OF CARE
Problem: CARDIOVASCULAR - ADULT  Goal: Maintains optimal cardiac output and hemodynamic stability  Description: INTERVENTIONS:  - Monitor I/O, vital signs and rhythm  - Monitor for S/S and trends of decreased cardiac output  - Administer and titrate ordered vasoactive medications to optimize hemodynamic stability  - Assess quality of pulses, skin color and temperature  - Assess for signs of decreased coronary artery perfusion  - Instruct patient to report change in severity of symptoms  Outcome: Progressing     Problem: MUSCULOSKELETAL - ADULT  Goal: Maintain or return mobility to safest level of function  Description: INTERVENTIONS:  - Assess patient's ability to carry out ADLs; assess patient's baseline for ADL function and identify physical deficits which impact ability to perform ADLs (bathing, care of mouth/teeth, toileting, grooming, dressing, etc )  - Assess/evaluate cause of self-care deficits   - Assess range of motion  - Assess patient's mobility  - Assess patient's need for assistive devices and provide as appropriate  - Encourage maximum independence but intervene and supervise when necessary  - Involve family in performance of ADLs  - Assess for home care needs following discharge   - Consider OT consult to assist with ADL evaluation and planning for discharge  - Provide patient education as appropriate  Outcome: Progressing     Problem: PAIN - ADULT  Goal: Verbalizes/displays adequate comfort level or baseline comfort level  Description: Interventions:  - Encourage patient to monitor pain and request assistance  - Assess pain using appropriate pain scale  - Administer analgesics based on type and severity of pain and evaluate response  - Implement non-pharmacological measures as appropriate and evaluate response  - Consider cultural and social influences on pain and pain management  - Notify physician/advanced practitioner if interventions unsuccessful or patient reports new pain  Outcome: Progressing     Problem: MOBILITY - ADULT  Goal: Maintain or return to baseline ADL function  Description: INTERVENTIONS:  -  Assess patient's ability to carry out ADLs; assess patient's baseline for ADL function and identify physical deficits which impact ability to perform ADLs (bathing, care of mouth/teeth, toileting, grooming, dressing, etc )  - Assess/evaluate cause of self-care deficits   - Assess range of motion  - Assess patient's mobility; develop plan if impaired  - Assess patient's need for assistive devices and provide as appropriate  - Encourage maximum independence but intervene and supervise when necessary  - Involve family in performance of ADLs  - Assess for home care needs following discharge   - Consider OT consult to assist with ADL evaluation and planning for discharge  - Provide patient education as appropriate  Outcome: Progressing

## 2023-06-26 NOTE — ASSESSMENT & PLAN NOTE
ECHO 10/10/2019: EF 40-45%, moderately dilated left atrium, mildly enlarged right atrium  · Repeat ECHO pending  · Cardiology following  · Hold metoprolol, continue lisinopril

## 2023-06-26 NOTE — PLAN OF CARE
Problem: CARDIOVASCULAR - ADULT  Goal: Maintains optimal cardiac output and hemodynamic stability  Description: INTERVENTIONS:  - Monitor I/O, vital signs and rhythm  - Monitor for S/S and trends of decreased cardiac output  - Administer and titrate ordered vasoactive medications to optimize hemodynamic stability  - Assess quality of pulses, skin color and temperature  - Assess for signs of decreased coronary artery perfusion  - Instruct patient to report change in severity of symptoms  Outcome: Progressing     Problem: MUSCULOSKELETAL - ADULT  Goal: Maintain or return mobility to safest level of function  Description: INTERVENTIONS:  - Assess patient's ability to carry out ADLs; assess patient's baseline for ADL function and identify physical deficits which impact ability to perform ADLs (bathing, care of mouth/teeth, toileting, grooming, dressing, etc )  - Assess/evaluate cause of self-care deficits   - Assess range of motion  - Assess patient's mobility  - Assess patient's need for assistive devices and provide as appropriate  - Encourage maximum independence but intervene and supervise when necessary  - Involve family in performance of ADLs  - Assess for home care needs following discharge   - Consider OT consult to assist with ADL evaluation and planning for discharge  - Provide patient education as appropriate  Outcome: Progressing     Problem: PAIN - ADULT  Goal: Verbalizes/displays adequate comfort level or baseline comfort level  Description: Interventions:  - Encourage patient to monitor pain and request assistance  - Assess pain using appropriate pain scale  - Administer analgesics based on type and severity of pain and evaluate response  - Implement non-pharmacological measures as appropriate and evaluate response  - Consider cultural and social influences on pain and pain management  - Notify physician/advanced practitioner if interventions unsuccessful or patient reports new pain  Outcome: Progressing

## 2023-06-26 NOTE — H&P
[address]  H&P  Name: Padmini Joshi 50 y o  female I MRN: [id number]  Unit/Bed#: [address] [place] [address] I Date of Admission: 6/26/2023   Date of Service: 6/26/2023 I [facility] Day: 0      Assessment/Plan   * Symptomatic bradycardia  Assessment & Plan  Patient presented to ED after syncopal episode this morning  She reports she was nauseous and fainted for an unknown amount of time  When she woke up, she had chest pressure and a headache  · History of peripartum cardiomyopathy and bradycardia  · EKG on admission showed sinus bradycardia HR 42, has remained bradycardic since admission  · CT head and CXR negative  · troponins negative  · Repeat ECHO pending  · Orthostatic vitals negative  · Continue to monitor on telemetry  · Hold pta metoprolol  · Cardiology consulted  · Plan for nuclear stress test tomorrow    Peripartum cardiomyopathy  Assessment & Plan  ECHO 10/10/2019: EF 40-45%, moderately dilated left atrium, mildly enlarged right atrium  · Repeat ECHO pending  · Cardiology following  · Hold metoprolol, continue lisinopril    Essential hypertension  Assessment & Plan  · Continue lisinopril  · Hold metoprolol in setting of bradycardia    Anxiety  Assessment & Plan  · Continue Buspar    Hypothyroidism  Assessment & Plan  · TSH WNL  · Continue levothyroxine and liothyronine       VTE Pharmacologic Prophylaxis: VTE Score: 3 Moderate Risk (Score 3-4) - Pharmacological DVT Prophylaxis Ordered: enoxaparin (Lovenox)  Code Status: Level 1 - Full Code   Discussion with family: Patient declined call to   Anticipated Length of Stay: Patient will be admitted on an observation basis with an anticipated length of stay of less than 2 midnights secondary to syncope, bradycardia, chest pain      Total Time Spent on Date of Encounter in care of patient: 65 minutes This time was spent on one or more of the following: performing physical exam; counseling and coordination of care; obtaining or reviewing history; documenting in the medical record; reviewing/ordering tests, medications or procedures; communicating with other healthcare professionals and discussing with patient's family/caregivers  Chief Complaint: syncope, chest pressure    History of Present Illness:  Gina Matthew is a 50 y o  female with a PMH of HTN, peripartum cardiomyopathy, hypothyroidism, anxiety who presents with syncopal episode  She reports this morning she felt nauseous and then she thinks when she was going from sitting to standing she fainted  She was alone and unsure how long she was unconscious but she thinks it was a few seconds  When she woke up, she had chest pressure, lightheadedness, dizziness, and a bad headache  Chest pressure feels like heaviness, does not radiate  Patient reports she is always bradycardic usually in the 50s  She denies shortness of breath, palpitations, fever, chills, cough, vomiting, diarrhea, leg swelling  Review of Systems:  Review of Systems   Constitutional: Negative for chills and fever  HENT: Negative for ear pain and sore throat  Eyes: Negative for pain and visual disturbance  Respiratory: Negative for cough and shortness of breath  Cardiovascular: Positive for chest pain  Negative for palpitations and leg swelling  Gastrointestinal: Positive for nausea  Negative for abdominal pain and vomiting  Genitourinary: Negative for dysuria and hematuria  Musculoskeletal: Negative for arthralgias and back pain  Skin: Negative for color change and rash  Neurological: Positive for dizziness, syncope, weakness and light-headedness  Negative for seizures  All other systems reviewed and are negative        Past Medical and Surgical History:   Past Medical History:   Diagnosis Date   • Allergic    • Anxiety    • Asthma    • Cardiomyopathy ([facility] [address] )     bradycardia normal 50-55 bpm   • Depression    • Disease of thyroid gland    • Interstitial cystitis        Past Surgical History:   Procedure Laterality Date   • BARIATRIC SURGERY      x2 surgeries lap band and bypass   •  SECTION      x2   • EAR RECONSTRUCTION Left     age 4 skin drum skin graft   • HYSTERECTOMY         Meds/Allergies:  Prior to Admission medications    Medication Sig Start Date End Date Taking?  Authorizing Provider   albuterol (Proventil HFA) 90 mcg/act inhaler Inhale 2 puffs 4 (four) times a day as needed for wheezing 22  Yes Grey Lundy, DO   busPIRone (BUSPAR) 5 mg tablet Take 1 tablet (5 mg total) by mouth 2 (two) times a day 3/20/23  Yes Grey Lundy, DO   cholecalciferol (VITAMIN D3) 25 mcg (1,000 units) tablet    Yes Historical Provider, MD   levothyroxine 200 mcg tablet Take 1 tablet by mouth once daily 23  Yes Grey Lundy DO   liothyronine (CYTOMEL) 5 mcg tablet Take 1 tablet (5 mcg total) by mouth daily 23  Yes Grey Lundy DO   lisinopril (ZESTRIL) 5 mg tablet Take 1 tablet (5 mg total) by mouth daily 22  Yes Grey Lundy DO   LORazepam (ATIVAN) 1 mg tablet Take 1 tablet (1 mg total) by mouth daily as needed for anxiety 23  Yes Grey Lundy DO   metoprolol succinate (TOPROL-XL) 25 mg 24 hr tablet Take 1 tablet (25 mg total) by mouth daily Takes half tab each dose 12 5mg 22  Yes Grey Lundy DO   montelukast (SINGULAIR) 10 mg tablet Take 1 tablet by mouth once daily 23  Yes Grey Lundy DO   traZODone (DESYREL) 50 mg tablet Take 1 tablet (50 mg total) by mouth daily 23  Yes Grey Lundy DO   vitamin B-12 (VITAMIN B-12) 1,000 mcg tablet Take by mouth daily   Yes Historical Provider, MD   fluconazole (DIFLUCAN) 150 mg tablet if needed  Patient not taking: Reported on 2023   Historical Provider, MD   metroNIDAZOLE (FLAGYL) 500 mg tablet Take 1 tablet (500 mg total) by mouth every 12 (twelve) hours for 14 days  Patient not taking: Reported on 2023 6/15/23 6/29/23  Grey Lundy,    Na Sulfate-K Sulfate-Mg Sulf "17 5-3 13-1 6 GM/177ML SOLN Take 1 kit by mouth once for 1 dose  Patient taking differently: Take 1 kit by mouth if needed 4/5/22 6/14/23  Gregory Avila PA-C   omeprazole (PriLOSEC) 40 MG capsule Take 1 capsule (40 mg total) by mouth daily  Patient taking differently: Take 40 mg by mouth if needed 4/5/22   Gregory Avila PA-C   PARoxetine (PAXIL) 30 mg tablet Take 1 tablet (30 mg total) by mouth every morning  Patient not taking: Reported on 6/26/2023 3/24/23   Heidi Laughter, DO     I have reviewed home medications with patient personally  Allergies: No Known Allergies    Social History:  Marital Status: Legally    Occupation: cardiac tech  Patient Pre-hospital Living Situation: Home  Patient Pre-hospital Level of Mobility: walks  Patient Pre-hospital Diet Restrictions: none  Substance Use History:   Social History     Substance and Sexual Activity   Alcohol Use Yes   • Alcohol/week: 1 0 standard drink of alcohol   • Types: 1 Cans of beer per week    Comment: social     Social History     Tobacco Use   Smoking Status Never   • Passive exposure: Never   Smokeless Tobacco Never     Social History     Substance and Sexual Activity   Drug Use Yes   • Types: Marijuana       Family History:  Family History   Problem Relation Age of Onset   • Asthma Father    • Arthritis Father    • Cancer Mother    • Cervical cancer Mother    • Arthritis Mother        Physical Exam:     Vitals:   Blood Pressure: 139/73 (06/26/23 1623)  Pulse: (!) 39 (06/26/23 1623)  Temperature: 98 5 °F (36 9 °C) (06/26/23 1112)  Temp Source: Oral (06/26/23 1112)  Respirations: 18 (06/26/23 1315)  Height: 5' 3\" (160 cm) (06/26/23 1400)  Weight - Scale: 75 9 kg (167 lb 5 3 oz) (06/26/23 1400)  SpO2: 98 % (06/26/23 1623)    Physical Exam  Vitals and nursing note reviewed  Constitutional:       General: She is not in acute distress  Appearance: She is well-developed  Cardiovascular:      Rate and Rhythm: Regular rhythm   Bradycardia " present  Pulmonary:      Effort: Pulmonary effort is normal  No respiratory distress  Breath sounds: Normal breath sounds  Abdominal:      Palpations: Abdomen is soft  Tenderness: There is no abdominal tenderness  Musculoskeletal:         General: No swelling  Skin:     General: Skin is warm and dry  Capillary Refill: Capillary refill takes less than 2 seconds  Neurological:      Mental Status: She is alert  Psychiatric:         Mood and Affect: Mood normal           Additional Data:     Lab Results:  Results from last 7 days   Lab Units 06/26/23  1151   WBC Thousand/uL 5 87   HEMOGLOBIN g/dL 11 2*   HEMATOCRIT % 34 8   PLATELETS Thousands/uL 183   NEUTROS PCT % 50   LYMPHS PCT % 33   MONOS PCT % 14*   EOS PCT % 2     Results from last 7 days   Lab Units 06/26/23  1151   SODIUM mmol/L 137   POTASSIUM mmol/L 4 4   CHLORIDE mmol/L 108   CO2 mmol/L 24   BUN mg/dL 14   CREATININE mg/dL 0 74   ANION GAP mmol/L 5   CALCIUM mg/dL 8 9   ALBUMIN g/dL 4 1   TOTAL BILIRUBIN mg/dL 0 83   ALK PHOS U/L 77   ALT U/L 17   AST U/L 30   GLUCOSE RANDOM mg/dL 90     Results from last 7 days   Lab Units 06/26/23  1151   INR  1 08                   Lines/Drains:  Invasive Devices     Peripheral Intravenous Line  Duration           Peripheral IV 06/26/23 Left Antecubital <1 day                    Imaging: Reviewed radiology reports from this admission including: chest xray  XR chest 1 view portable   Final Result by Beatriz Taylor MD (06/26 1432)      No acute cardiopulmonary disease  Workstation performed: [id number]         CT head without contrast   Final Result by Justin Mcdaniel MD (06/26 1303)      No acute intracranial abnormality  Workstation performed: [id number]             EKG and Other Studies Reviewed on Admission:   · EKG: Sinus Bradycardia  HR 42     ** Please Note: This note has been constructed using a voice recognition system   **

## 2023-06-26 NOTE — CONSULTS
Consultation - Cardiology   [facility]     Ralph Liang 50 y o  female MRN: [id number]  : 1975  Unit/Bed#: ED 15 Encounter: [id number]      Assessment & Plan   1  Chest pain  - Patient presented on 2023 for evaluation for syncope and chest pain  She denies chest pain is associated with shortness of breath or palpitations  She describes the chest pain as pressure localized to the center of her chest, she states it does not radiate  - 23 hs troponin: 4 (0hrs), 4 (2hrs), pending 4 hrs  - 23 EKG: Marked sinus bradycardia with occasional PVCs, rate 42 bpm   Nonspecific T wave abnormality   - Follow up 23 TTE    - Continue telemetry  - Will plan for nuclear stress test on 23  2  Syncope  - She reports she was feeling fatigued/weak and nauseous yesterday evening  Awoke this morning feeling nauseous, lightheaded, and dizzy  She states that she was sitting at her desk this morning and next thing she knew she woke up on the ground  Patient is unsure how long she was unconscious  She states she had no prodrome  She states she when she awoke she had chest pain, lightheadedness, dizziness, and headache  She denies chest pain is associated with shortness of breath or palpitations   - Orthostatic vital signs negative  - Follow up 23 TTE    - Continue telemetry  - Will plan for nuclear stress test on 23  3  Symptomatic bradycardia  - Patient has been bradycardic since presentation  - 23 EKG: Marked sinus bradycardia with occasional PVCs, rate 42 bpm   Nonspecific T wave abnormality   - Will plan to hold Toprol-XL 12 5 mg in setting of bradycardia  - Continue telemetry  4  Peripartum cardiomyopathy     - Patient with known history of peripartum cardiomyopathy, known history for approximately 15 years after the birth of her second child  Per patient her EF has remained stable over the years     - 10/10/2019 TTE: LVEF 40 to 45%   Moderately dilated left atrium  Mildly enlarged right atrium  Mild mitral valve regurgitation  Mild tricuspid valve regurgitation  Mildly elevated pulmonary artery pressure PASP calculated at 40 mmHg  Trace pulmonic valve regurgitation   - Follow up 6/26/23 TTE  5  Hypertension     - BP acceptable  - Will plan to hold Toprol-XL 12 5 mg in setting of bradycardia   - Okay to continue home medication of lisinopril 5 mg daily  - Continue to monitor BP    6  PVCs  - Patient with documented history of PVCs  - 6/26/23 EKG: Marked sinus bradycardia with occasional PVCs, rate 42 bpm   Nonspecific T wave abnormality   - Will plan to hold Toprol-XL 12 5 mg in setting of bradycardia  8  Hypothyroidism     - 6/26/23 TSH: 1 748    - Review of home medications note patient is on levothyroxine 200 mcg daily and liothyronine 5 mcg daily  9  Demyelinating disease      - Evaluated by Neurology on 6/14/23 for concern for visual disturbances and hearing loss  She states she has had several months of worsening fatigue and muscle aches  Summary of Recommendations: Thank you for your consultation  Physician Requesting Consult: Bradly Roblero DO    Reason for Consult / Principal Problem: Chest pain, syncope, bradycardia  Inpatient consult to Cardiology  Consult performed by: Alex Xiao PA-C  Consult ordered by: Hasmukh Amaya PA-C          HPI: Harrison Pierre is a 50y o  year old female with PMHx peripartum cardiomyopathy, PVCs, HTN, hypothyroidism, demyelinating disease who presented on 6/26/2023 for evaluation for episode of syncope and chest pain  She reports she was feeling fatigued/weak and nauseous yesterday evening  Awoke this morning feeling nauseous, lightheaded, and dizzy  She states that she was sitting at her desk this morning and next thing she knew she woke up on the ground  Patient is unsure how long she was unconscious  She states she had no prodrome    She states she when she awoke she had chest pain, lightheadedness, dizziness, and headache  She denies chest pain is associated with shortness of breath or palpitations  She describes the chest pain as pressure localized to the center of her chest, she states it does not radiate  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  Patient reports known history of bradycardia  States that she has had Holter monitors in the past has documented heart rates as low as 32 bpm        Patient with known history of peripartum cardiomyopathy, known history for approximately 15 years after the birth of her second child  Per patient her EF has remained stable over the years  10/10/2019 TTE: LVEF 40 to 45%  Moderately dilated left atrium  Mildly enlarged right atrium  Mild mitral valve regurgitation  Mild tricuspid valve regurgitation  Mildly elevated pulmonary artery pressure PASP calculated at 40 mmHg  Trace pulmonic valve regurgitation  Review of Systems   Constitutional: Positive for fatigue  Negative for activity change, appetite change, chills, diaphoresis, fever and unexpected weight change  Respiratory: Positive for chest tightness  Negative for shortness of breath and wheezing  Cardiovascular: Positive for chest pain  Negative for palpitations and leg swelling  Gastrointestinal: Positive for nausea  Negative for abdominal distention, abdominal pain, diarrhea and vomiting  Skin: Negative  Neurological: Positive for dizziness, syncope, weakness, light-headedness, numbness and headaches         Historical Information   Past Medical History:   Diagnosis Date   • Allergic    • Anxiety    • Asthma    • Cardiomyopathy ([address] )     bradycardia normal 50-55 bpm   • Depression    • Disease of thyroid gland    • Interstitial cystitis      Past Surgical History:   Procedure Laterality Date   • BARIATRIC SURGERY      x2 surgeries lap band and bypass   •  SECTION      x2   • EAR RECONSTRUCTION Left     age 4 skin drum skin graft   • HYSTERECTOMY       Social History     Substance and Sexual Activity   Alcohol Use Yes   • Alcohol/week: 1 0 standard drink of alcohol   • Types: 1 Cans of beer per week    Comment: social     Social History     Substance and Sexual Activity   Drug Use Yes   • Types: Marijuana     Social History     Tobacco Use   Smoking Status Never   • Passive exposure: Never   Smokeless Tobacco Never     Family History:   Family History   Problem Relation Age of Onset   • Asthma Father    • Arthritis Father    • Cancer Mother    • Cervical cancer Mother    • Arthritis Mother        Meds/Allergies    PTA meds:  (Not in a hospital admission)     No Known Allergies    Current Facility-Administered Medications:   •  ketorolac (TORADOL) injection 15 mg, 15 mg, Intravenous, Once, Debbie Jimenez PA-C    Current Outpatient Medications:   •  albuterol (Proventil HFA) 90 mcg/act inhaler, Inhale 2 puffs 4 (four) times a day as needed for wheezing, Disp: 1 g, Rfl: 0  •  busPIRone (BUSPAR) 5 mg tablet, Take 1 tablet (5 mg total) by mouth 2 (two) times a day, Disp: 60 tablet, Rfl: 5  •  cholecalciferol (VITAMIN D3) 25 mcg (1,000 units) tablet, , Disp: , Rfl:   •  fluconazole (DIFLUCAN) 150 mg tablet, if needed, Disp: , Rfl:   •  levothyroxine 200 mcg tablet, Take 1 tablet by mouth once daily, Disp: 90 tablet, Rfl: 0  •  liothyronine (CYTOMEL) 5 mcg tablet, Take 1 tablet (5 mcg total) by mouth daily, Disp: 90 tablet, Rfl: 0  •  lisinopril (ZESTRIL) 5 mg tablet, Take 1 tablet (5 mg total) by mouth daily, Disp: 90 tablet, Rfl: 1  •  LORazepam (ATIVAN) 1 mg tablet, Take 1 tablet (1 mg total) by mouth daily as needed for anxiety, Disp: 30 tablet, Rfl: 0  •  metoprolol succinate (TOPROL-XL) 25 mg 24 hr tablet, Take 1 tablet (25 mg total) by mouth daily Takes half tab each dose 12 5mg, Disp: 90 tablet, Rfl: 1  •  metroNIDAZOLE (FLAGYL) 500 mg tablet, Take 1 tablet (500 mg total) by mouth every 12 (twelve) hours for 14 "days, Disp: 28 tablet, Rfl: 0  •  montelukast (SINGULAIR) 10 mg tablet, Take 1 tablet by mouth once daily, Disp: 90 tablet, Rfl: 0  •  Na Sulfate-K Sulfate-Mg Sulf 17 5-3 13-1 6 GM/177ML SOLN, Take 1 kit by mouth once for 1 dose (Patient taking differently: Take 1 kit by mouth if needed), Disp: 354 mL, Rfl: 0  •  omeprazole (PriLOSEC) 40 MG capsule, Take 1 capsule (40 mg total) by mouth daily (Patient taking differently: Take 40 mg by mouth if needed), Disp: 30 capsule, Rfl: 2  •  PARoxetine (PAXIL) 30 mg tablet, Take 1 tablet (30 mg total) by mouth every morning, Disp: , Rfl:   •  traZODone (DESYREL) 50 mg tablet, Take 1 tablet (50 mg total) by mouth daily, Disp: 30 tablet, Rfl: 0  •  vitamin B-12 (VITAMIN B-12) 1,000 mcg tablet, Take by mouth daily, Disp: , Rfl:     VTE Pharmacologic Prophylaxis: none  Objective:   Vitals: Blood pressure 119/51, pulse (!) 40, temperature 98 5 °F (36 9 °C), temperature source Oral, resp  rate 18, height 5' 3\" (1 6 m), weight 75 9 kg (167 lb 5 3 oz), SpO2 99 %  Body mass index is 29 64 kg/m²  Wt Readings from Last 3 Encounters:   06/26/23 75 9 kg (167 lb 5 3 oz)   06/14/23 78 kg (172 lb)   05/24/23 77 1 kg (170 lb)     BP Readings from Last 3 Encounters:   06/26/23 119/51   06/14/23 110/56   05/24/23 122/70     Orthostatic Blood Pressures    Flowsheet Row Most Recent Value   Blood Pressure 119/51 filed at 06/26/2023 1400   Patient Position - Orthostatic VS Standing - Orthostatic VS filed at 06/26/2023 1224        No intake or output data in the 24 hours ending 06/26/23 1535    Invasive Devices     Peripheral Intravenous Line  Duration           Peripheral IV 06/26/23 Left Antecubital <1 day                  Physical Exam:   Physical Exam  Vitals reviewed  Constitutional:       General: She is not in acute distress  Appearance: She is not ill-appearing  Cardiovascular:      Rate and Rhythm: Regular rhythm  Bradycardia present  Pulses: Normal pulses        Heart " "sounds: Murmur heard  Pulmonary:      Effort: Pulmonary effort is normal  No respiratory distress  Breath sounds: Normal breath sounds  Abdominal:      General: Abdomen is flat  There is no distension  Palpations: Abdomen is soft  Tenderness: There is no abdominal tenderness  Musculoskeletal:      Right lower leg: No edema  Left lower leg: No edema  Skin:     General: Skin is warm and dry  Neurological:      Mental Status: She is alert and oriented to person, place, and time  Labs:   Troponins:  Results from last 7 days   Lab Units 06/26/23  1359   HSTNI D2 ng/L 0       CBC with diff:   Results from last 7 days   Lab Units 06/26/23  1151   WBC Thousand/uL 5 87   HEMOGLOBIN g/dL 11 2*   HEMATOCRIT % 34 8   MCV fL 90   PLATELETS Thousands/uL 183   RBC Million/uL 3 88   MCH pg 28 9   MCHC g/dL 32 2   RDW % 14 1   MPV fL 11 6   NRBC AUTO /100 WBCs 0       CMP:   Results from last 7 days   Lab Units 06/26/23  1151   SODIUM mmol/L 137   POTASSIUM mmol/L 4 4   CHLORIDE mmol/L 108   CO2 mmol/L 24   ANION GAP mmol/L 5   BUN mg/dL 14   CREATININE mg/dL 0 74   CALCIUM mg/dL 8 9   AST U/L 30   ALT U/L 17   ALK PHOS U/L 77   TOTAL PROTEIN g/dL 6 9   ALBUMIN g/dL 4 1   TOTAL BILIRUBIN mg/dL 0 83   EGFR ml/min/1 73sq m 95   GLUCOSE RANDOM mg/dL 90       Magnesium:     Coags:   Results from last 7 days   Lab Units 06/26/23  1151   PTT seconds 29   INR  1 08     TSH:    Results from last 7 days   Lab Units 06/26/23  1151   TSH 3RD GENERATON uIU/mL 1 748     No components found for: \"TSH3\"  Lipid Profile:     Lipid Profile:   Lab Results   Component Value Date    CHOLESTEROL 151 04/19/2023    HDL 49 04/19/2023    LDLCALC 90 04/19/2023    TRIG 61 04/19/2023     Hgb A1c:     NT-proBNP: No results for input(s): \"NTBNP\" in the last 72 hours  Imaging & Testing     Cardiac testing:   No results found for this or any previous visit  Imaging: I have personally reviewed pertinent reports      XR chest " 1 view portable    Result Date: 6/26/2023    Impression: No acute cardiopulmonary disease  CT head without contrast    Result Date: 6/26/2023    Impression: No acute intracranial abnormality  EKG/ Monitor: Personally reviewed  6/26/23 EKG: Marked sinus bradycardia with occasional PVCs, rate 42 bpm   Nonspecific T wave abnormality       Code Status: No Order  Advance Directive and Living Will:      POLST:        Caitlin Muñoz PA-C

## 2023-06-27 ENCOUNTER — APPOINTMENT (OUTPATIENT)
Dept: NON INVASIVE DIAGNOSTICS | Facility: HOSPITAL | Age: 48
End: 2023-06-27
Payer: COMMERCIAL

## 2023-06-27 ENCOUNTER — APPOINTMENT (OUTPATIENT)
Dept: RADIOLOGY | Facility: HOSPITAL | Age: 48
End: 2023-06-27
Payer: COMMERCIAL

## 2023-06-27 VITALS
DIASTOLIC BLOOD PRESSURE: 60 MMHG | OXYGEN SATURATION: 94 % | WEIGHT: 167.33 LBS | HEIGHT: 63 IN | TEMPERATURE: 98 F | BODY MASS INDEX: 29.65 KG/M2 | HEART RATE: 51 BPM | RESPIRATION RATE: 18 BRPM | SYSTOLIC BLOOD PRESSURE: 138 MMHG

## 2023-06-27 LAB
AORTIC ROOT: 2.6 CM
APICAL FOUR CHAMBER EJECTION FRACTION: 47 %
ATRIAL RATE: 39 BPM
ATRIAL RATE: 43 BPM
AV REGURGITATION PRESSURE HALF TIME: 687 MS
CHEST PAIN STATEMENT: NORMAL
E WAVE DECELERATION TIME: 156 MS
FRACTIONAL SHORTENING: 25 (ref 28–44)
INTERVENTRICULAR SEPTUM IN DIASTOLE (PARASTERNAL SHORT AXIS VIEW): 0.9 CM
INTERVENTRICULAR SEPTUM: 0.9 CM (ref 0.6–1.1)
LAAS-AP2: 27.6 CM2
LAAS-AP4: 20.8 CM2
LEFT ATRIUM SIZE: 4.1 CM
LEFT ATRIUM VOLUME (MOD BIPLANE): 78 ML
LEFT INTERNAL DIMENSION IN SYSTOLE: 4.5 CM (ref 2.1–4)
LEFT VENTRICLE DIASTOLIC VOLUME (MOD BIPLANE): 171 ML
LEFT VENTRICLE SYSTOLIC VOLUME (MOD BIPLANE): 84 ML
LEFT VENTRICULAR INTERNAL DIMENSION IN DIASTOLE: 6 CM (ref 3.5–6)
LEFT VENTRICULAR POSTERIOR WALL IN END DIASTOLE: 0.8 CM
LEFT VENTRICULAR STROKE VOLUME: 87 ML
LV EF: 51 %
LVSV (TEICH): 87 ML
MAX DIASTOLIC BP: 80 MMHG
MAX HEART RATE: 114 BPM
MAX HR PERCENT: 66 %
MAX HR: 114 BPM
MAX PREDICTED HEART RATE: 171 BPM
MAX. SYSTOLIC BP: 134 MMHG
MV E'TISSUE VEL-LAT: 12 CM/S
MV E'TISSUE VEL-SEP: 11 CM/S
MV PEAK A VEL: 0.86 M/S
MV PEAK E VEL: 100 CM/S
MV STENOSIS PRESSURE HALF TIME: 45 MS
MV VALVE AREA P 1/2 METHOD: 4.89
P AXIS: 48 DEGREES
P AXIS: 49 DEGREES
PR INTERVAL: 178 MS
PR INTERVAL: 182 MS
PROTOCOL NAME: NORMAL
QRS AXIS: 37 DEGREES
QRS AXIS: 37 DEGREES
QRSD INTERVAL: 90 MS
QRSD INTERVAL: 90 MS
QT INTERVAL: 500 MS
QT INTERVAL: 508 MS
QTC INTERVAL: 408 MS
QTC INTERVAL: 422 MS
RATE PRESSURE PRODUCT: NORMAL
REASON FOR TERMINATION: NORMAL
RIGHT ATRIUM AREA SYSTOLE A4C: 19.2 CM2
RIGHT VENTRICLE ID DIMENSION: 3.5 CM
SL CV AV DECELERATION TIME RETROGRADE: 2369 MS
SL CV AV PEAK GRADIENT RETROGRADE: 66 MMHG
SL CV LEFT ATRIUM LENGTH A2C: 6 CM
SL CV LV EF: 45
SL CV PED ECHO LEFT VENTRICLE DIASTOLIC VOLUME (MOD BIPLANE) 2D: 181 ML
SL CV PED ECHO LEFT VENTRICLE SYSTOLIC VOLUME (MOD BIPLANE) 2D: 94 ML
SL CV REST NUCLEAR ISOTOPE DOSE: 10.2 MCI
SL CV STRESS NUCLEAR ISOTOPE DOSE: 31.7 MCI
SL CV STRESS RECOVERY BP: NORMAL MMHG
SL CV STRESS RECOVERY HR: 52 BPM
SL CV STRESS RECOVERY O2 SAT: 100 %
STRESS ANGINA INDEX: 1
STRESS BASELINE BP: NORMAL MMHG
STRESS BASELINE HR: 46 BPM
STRESS O2 SAT REST: 100 %
STRESS PEAK HR: 100 BPM
STRESS POST ESTIMATED WORKLOAD: 1.6 METS
STRESS POST EXERCISE DUR MIN: 3 MIN
STRESS POST O2 SAT PEAK: 98 %
STRESS POST PEAK BP: 130 MMHG
STRESS/REST PERFUSION RATIO: 1.2
T WAVE AXIS: -28 DEGREES
T WAVE AXIS: -36 DEGREES
TARGET HR FORMULA: NORMAL
TIME IN EXERCISE PHASE: NORMAL
TR MAX PG: 29 MMHG
TR PEAK VELOCITY: 2.7 M/S
TRICUSPID ANNULAR PLANE SYSTOLIC EXCURSION: 2.7 CM
TRICUSPID VALVE PEAK REGURGITATION VELOCITY: 2.7 M/S
VENTRICULAR RATE: 39 BPM
VENTRICULAR RATE: 43 BPM

## 2023-06-27 PROCEDURE — 99214 OFFICE O/P EST MOD 30 MIN: CPT | Performed by: INTERNAL MEDICINE

## 2023-06-27 PROCEDURE — 93018 CV STRESS TEST I&R ONLY: CPT | Performed by: INTERNAL MEDICINE

## 2023-06-27 PROCEDURE — 93017 CV STRESS TEST TRACING ONLY: CPT

## 2023-06-27 PROCEDURE — 93016 CV STRESS TEST SUPVJ ONLY: CPT | Performed by: INTERNAL MEDICINE

## 2023-06-27 PROCEDURE — 93005 ELECTROCARDIOGRAM TRACING: CPT

## 2023-06-27 PROCEDURE — 93306 TTE W/DOPPLER COMPLETE: CPT | Performed by: INTERNAL MEDICINE

## 2023-06-27 PROCEDURE — 78452 HT MUSCLE IMAGE SPECT MULT: CPT | Performed by: INTERNAL MEDICINE

## 2023-06-27 PROCEDURE — A9502 TC99M TETROFOSMIN: HCPCS

## 2023-06-27 PROCEDURE — 93010 ELECTROCARDIOGRAM REPORT: CPT | Performed by: INTERNAL MEDICINE

## 2023-06-27 PROCEDURE — 99232 SBSQ HOSP IP/OBS MODERATE 35: CPT

## 2023-06-27 PROCEDURE — 78452 HT MUSCLE IMAGE SPECT MULT: CPT

## 2023-06-27 PROCEDURE — G1004 CDSM NDSC: HCPCS

## 2023-06-27 RX ORDER — REGADENOSON 0.08 MG/ML
0.4 INJECTION, SOLUTION INTRAVENOUS ONCE
Status: COMPLETED | OUTPATIENT
Start: 2023-06-27 | End: 2023-06-27

## 2023-06-27 RX ORDER — ACETAMINOPHEN 325 MG/1
650 TABLET ORAL EVERY 6 HOURS PRN
Status: DISCONTINUED | OUTPATIENT
Start: 2023-06-27 | End: 2023-06-27 | Stop reason: HOSPADM

## 2023-06-27 RX ORDER — KETOROLAC TROMETHAMINE 30 MG/ML
15 INJECTION, SOLUTION INTRAMUSCULAR; INTRAVENOUS ONCE AS NEEDED
Status: DISCONTINUED | OUTPATIENT
Start: 2023-06-27 | End: 2023-06-27 | Stop reason: HOSPADM

## 2023-06-27 RX ADMIN — LIOTHYRONINE SODIUM 5 MCG: 5 TABLET ORAL at 08:31

## 2023-06-27 RX ADMIN — BUSPIRONE HYDROCHLORIDE 5 MG: 5 TABLET ORAL at 08:31

## 2023-06-27 RX ADMIN — REGADENOSON 0.4 MG: 0.08 INJECTION, SOLUTION INTRAVENOUS at 10:48

## 2023-06-27 RX ADMIN — LISINOPRIL 5 MG: 5 TABLET ORAL at 08:31

## 2023-06-27 RX ADMIN — ENOXAPARIN SODIUM 40 MG: 40 INJECTION SUBCUTANEOUS at 08:31

## 2023-06-27 RX ADMIN — LEVOTHYROXINE SODIUM 200 MCG: 100 TABLET ORAL at 05:39

## 2023-06-27 RX ADMIN — ACETAMINOPHEN 650 MG: 325 TABLET ORAL at 14:57

## 2023-06-27 NOTE — ASSESSMENT & PLAN NOTE
Patient presented to ED after syncopal episode this morning  She reports she was nauseous and fainted for an unknown amount of time  When she woke up, she had chest pressure and a headache    · History of peripartum cardiomyopathy and bradycardia  · EKG on admission showed sinus bradycardia HR 42, has remained bradycardic since admission  · CT head and CXR negative  · troponins negative  · Repeat ECHO shows EF 21-36%, systolic function mildly reduced  · Orthostatic vitals negative  · Cardiology consulted  · Continue to hold metoprolol  · Nuclear stress test 6/27/23: official report pending but per cardiologist, no major reversible perfusion defects  · Recommended outpatient extended Holter monitor ordered by cardiology  · Compression stockings  · Follow up with cardiology outpatient

## 2023-06-27 NOTE — DISCHARGE INSTR - AVS FIRST PAGE
Follow up with your PCP in 1-2 weeks for post hospitalization follow up  Stop taking your metoprolol in setting of bradycardia  Wear compression stockings and change positions slowly  Cardiology has recommended an extended outpatient holter monitor  Follow up cardiology outpatient

## 2023-06-27 NOTE — UTILIZATION REVIEW
Initial Clinical Review    Admission: Date/Time/Statement:   Admission Orders (From admission, onward)     Ordered        06/26/23 1541  Place in Observation  Once                      Orders Placed This Encounter   Procedures   • Place in Observation     Standing Status:   Standing     Number of Occurrences:   1     Order Specific Question:   Level of Care     Answer:   Med Surg [16]     ED Arrival Information     Expected   -    Arrival   6/26/2023 10:18    Acuity   Urgent            Means of arrival   Walk-In    Escorted by   Self    Service   [facility]ist    Admission type   Emergency            Arrival complaint   chest pain,syncopal episode(lic cardio tech)           Chief Complaint   Patient presents with   • Chest Pain     Patient states that when she woke up mthis morning she had an episode of syncope and presently has a chest pain, Hx of cardiomyopathy  Parentsdied of both heart attack which worries her       Initial Presentation: 50 y o  female   49 y/o female, h/o cardiomyopathy/demyelinating disease, presenting today after a syncopal episode that occurred prior to arrival  Relays she believes she was getting up to go get something to eat when she felt lightheaded, next thing she knew she was waking up on the ground, believes that she lost consciousness  Patient was somewhat symptomatic prior to syncopal event however initially could not remember exact situation surrounding syncope  Currently has chest pain, she is unsure if this started before after she syncopized  Currently has midsternal chest pain  Concerned as she has a significant family history of cardiac related deaths  She currently is being evaluated by neurology for demyelinating disorder but also seems to run in the family  Notes a headache  Occasional blurry vision which has been ongoing    Patient states that she typically has a low heart rate, as recorded in past notes typically between 50 and 55, today she is in the high 30s to low 50s, does not become tachy  Presents with HR in 40's  Admission work-up showing bradycardia  Placed in observation status for symptomatic bradycardia  Per cardio: chest pain, syncope  Follow up 6/26/23 TTE  Hold Toprol XL  Continue telemetry  Plan for nuclear stress test on 6/27/23 6/27/23- observation:  Per cardio: status post echocardiogram with EF near 45 to 50%  No major valvular abnormalities  She had a syncopal episode possible vasovagal?  She was noted to have positive orthostasis by diastolic criteria  She is on lisinopril for history of cardiomyopathy  She will benefit from compression/behavior modifications/core strengthening  Her nuclear stress test was negative for major reversible perfusion defects  The patient is noted to have periodic monomorphic PVCs  Recommend she have an outpatient extended Holter monitor  We will also discontinue her metoprolol in the setting of marked bradycardia      ED Triage Vitals [06/26/23 1112]   Temperature Pulse Respirations Blood Pressure SpO2   98 5 °F (36 9 °C) (!) 44 18 145/66 100 %      Temp Source Heart Rate Source Patient Position - Orthostatic VS BP Location FiO2 (%)   Oral Monitor Lying Right arm --      Pain Score       7          Wt Readings from Last 1 Encounters:   06/26/23 75 9 kg (167 lb 5 3 oz)     Additional Vital Signs:   Date/Time Temp Pulse Resp BP MAP (mmHg) SpO2 O2 Device Patient Position - Orthostatic VS   06/27/23 07:07:21 98 °F (36 7 °C) 41 Abnormal  18 137/66 90 98 % None (Room air) --   06/27/23 03:41:29 97 5 °F (36 4 °C) 48 Abnormal  -- 112/49 Abnormal  70 96 % -- --   06/26/23 22:03:08 98 4 °F (36 9 °C) 41 Abnormal  18 130/63 85 98 % None (Room air) --   06/26/23 19:28:33 97 9 °F (36 6 °C) 52 Abnormal  20 112/42 Abnormal  65 99 % None (Room air) --   06/26/23 16:23:05 -- 39 Abnormal  -- 139/73 95 98 % -- --   06/26/23 1400 -- 40 Abnormal  -- 119/51 -- 99 % -- --   06/26/23 1315 -- 50 Abnormal  18 -- -- 100 % -- --   06/26/23 1224 -- 52 Abnormal  17 123/61 -- -- -- Standing - Orthostatic VS   06/26/23 1222 -- 54 Abnormal  -- 116/57 -- -- -- Sitting - Orthostatic VS   06/26/23 1220 -- 50 Abnormal  18 111/51 -- -- -- Lying - Orthostatic VS   06/26/23 1112 98 5 °F (36 9 °C) 44 Abnormal  18 145/66 -- 100 % None (Room air) Lying     Pertinent Labs/Diagnostic Test Results:   XR chest 1 view portable   Final Result  (06/26 1432)      No acute cardiopulmonary disease  CT head without contrast   Final Result  (06/26 1303)      No acute intracranial abnormality  6/26 Ekg=  Marked sinus bradycardia with occasional Premature ventricular complexes  Nonspecific T wave abnormality    6/26 Echo=  •  Left Ventricle: Left ventricular cavity size is mildly dilated  Wall thickness is normal  The left ventricular ejection fraction is 45-50% by visual estimation  Systolic function is mildly reduced  There is mild global hypokinesis  Diastolic function is normal for age  •  Right Ventricle: Right ventricular cavity size is normal  Systolic function is normal  Normal tricuspid annular plane systolic excursion (TAPSE) > 1 7 cm  •  Left Atrium: The atrium is moderately dilated (42-48 mL/m2)  •  Tricuspid Valve:  The right ventricular systolic pressure is mildly elevated at 44mHg    Results from last 7 days   Lab Units 06/26/23  1151   WBC Thousand/uL 5 87   HEMOGLOBIN g/dL 11 2*   HEMATOCRIT % 34 8   PLATELETS Thousands/uL 183   NEUTROS ABS Thousands/µL 2 89     Results from last 7 days   Lab Units 06/26/23  1151   SODIUM mmol/L 137   POTASSIUM mmol/L 4 4   CHLORIDE mmol/L 108   CO2 mmol/L 24   ANION GAP mmol/L 5   BUN mg/dL 14   CREATININE mg/dL 0 74   EGFR ml/min/1 73sq m 95   CALCIUM mg/dL 8 9     Results from last 7 days   Lab Units 06/26/23  1151   AST U/L 30   ALT U/L 17   ALK PHOS U/L 77   TOTAL PROTEIN g/dL 6 9   ALBUMIN g/dL 4 1   TOTAL BILIRUBIN mg/dL 0 83     Results from last 7 days   Lab Units 06/26/23  1151   GLUCOSE RANDOM mg/dL 90     Results from last 7 days   Lab Units 06/26/23  1649 06/26/23  1359 06/26/23  1151   HS TNI 0HR ng/L  --   --  4   HS TNI 2HR ng/L  --  4  --    HSTNI D2 ng/L  --  0  --    HS TNI 4HR ng/L 6  --   --    HSTNI D4 ng/L 2  --   --      Results from last 7 days   Lab Units 06/26/23  1151   PROTIME seconds 14 1   INR  1 08   PTT seconds 29     Results from last 7 days   Lab Units 06/26/23  1151   TSH 3RD GENERATON uIU/mL 1 748     Results from last 7 days   Lab Units 06/26/23  1151   BNP pg/mL 57     ED Treatment:   Medication Administration from 06/26/2023 1018 to 06/26/2023 1616       Date/Time Order Dose Route Action     06/26/2023 1205 EDT sodium chloride 0 9 % bolus 1,000 mL 1,000 mL Intravenous New Bag        Past Medical History:   Diagnosis Date   • Allergic    • Anxiety    • Asthma    • Cardiomyopathy ([facility] )     bradycardia normal 50-55 bpm   • Depression    • Disease of thyroid gland    • Interstitial cystitis      Present on Admission:  • Hypothyroidism  • Essential hypertension  • Anxiety      Admitting Diagnosis: Syncope and collapse [R55]  Chest pain [R07 9]  Anemia [D64 9]  Symptomatic bradycardia [R00 1]  Age/Sex: 50 y o  female  Admission Orders:  Telemetry consult cardio  Scd  Orthostatic VS    Scheduled Medications:  busPIRone, 5 mg, Oral, BID  enoxaparin, 40 mg, Subcutaneous, Daily  ketorolac, 15 mg, Intravenous, Q8H  levothyroxine, 200 mcg, Oral, Early Morning  liothyronine, 5 mcg, Oral, Daily  lisinopril, 5 mg, Oral, Daily  melatonin, 6 mg, Oral, HS  metoclopramide, 5 mg, Intravenous, Q8H SERINA  montelukast, 10 mg, Oral, HS    PRN Meds:  albuterol, 2 puff, Inhalation, 4x Daily PRN  diphenhydrAMINE, 25 mg, Intravenous, Q8H PRN    Network Utilization Review Department  ATTENTION: Please call with any questions or concerns to [phone] and carefully listen to the prompts so that you are directed to the right person   All voicemails are confidential   Angelique Barajas all requests for admission clinical reviews, approved or denied determinations and any other requests to dedicated fax number below belonging to the campus where the patient is receiving treatment   List of dedicated fax numbers for the Facilities:  [address] [place] [address] DENIALS (Administrative/Medical Necessity) [phone]   [address] (Maternity/NICU/Pediatrics) [phone] [address] [phone]   [facility]  [phone]   [address] [address] Gildardo Ferris [facility] [phone]   [address] [facility] [address] 815 [facility] [phone]

## 2023-06-27 NOTE — ASSESSMENT & PLAN NOTE
ECHO 10/10/2019: EF 40-45%, moderately dilated left atrium, mildly enlarged right atrium  · Repeat ECHO shows EF 74-54%, systolic function mildly reduced  · Cardiology following  · Hold metoprolol, continue lisinopril

## 2023-06-27 NOTE — PROGRESS NOTES
[facility]  Progress Note  Name: Sathish Vann  MRN: [id number]  Unit/Bed#: [address]  Date of Admission: 6/26/2023   Date of Service: 6/27/2023 I [facility] Day: 0    Assessment/Plan   * Symptomatic bradycardia  Assessment & Plan  Patient presented to ED after syncopal episode this morning  She reports she was nauseous and fainted for an unknown amount of time  When she woke up, she had chest pressure and a headache    · History of peripartum cardiomyopathy and bradycardia  · EKG on admission showed sinus bradycardia HR 42, has remained bradycardic since admission  · CT head and CXR negative  · troponins negative  · Repeat ECHO shows EF 31-25%, systolic function mildly reduced  · Orthostatic vitals negative  · Cardiology consulted  · Continue to hold metoprolol  · Nuclear stress test 6/27/23: official report pending but per cardiologist, no major reversible perfusion defects  · Recommended outpatient extended Holter monitor ordered by cardiology  · Compression stockings  · Follow up with cardiology outpatient    Peripartum cardiomyopathy  Assessment & Plan  ECHO 10/10/2019: EF 40-45%, moderately dilated left atrium, mildly enlarged right atrium  · Repeat ECHO shows EF 45-91%, systolic function mildly reduced  · Cardiology following  · Hold metoprolol, continue lisinopril    Essential hypertension  Assessment & Plan  · Continue lisinopril  · Hold metoprolol in setting of bradycardia    Anxiety  Assessment & Plan  · Continue Buspar    Hypothyroidism  Assessment & Plan  · TSH WNL  · Continue levothyroxine and liothyronine         Medical Problems     Resolved Problems  Date Reviewed: 6/27/2023   None       Discharging Physician / Practitioner: Emiloi Lawson PA-C  PCP: Jeremías Adams DO  Admission Date:   Admission Orders (From admission, onward)     Ordered        06/26/23 1541  Place in Observation  Once                      Discharge Date: 06/27/23    [address] Stay:  · Cardiology    Procedures Performed:   · NM myocardial perfusion spect: official report pending, but per cardiologist, negative for major reversible perfusion defects    Significant Findings / Test Results:   · CT head: No acute intracranial abnormality  · CXR: No acute cardiopulmonary disease  Incidental Findings:   · none    Test Results Pending at Discharge (will require follow up):   · none     Outpatient Tests Requested:  · Extended holter monitor    Complications:  none    Reason for Admission: syncope, bradycardia    [facility] Course:   Ariana Weaver is a 50 y o  female patient who originally presented to the hospital on 6/26/2023 due to a syncopal episode at home  She reports she felt nauseous and fainted for an unknown amount of time and when she woke up had chest pressure, dizziness, and a headache  EKG showed sinus bradycardia and her troponins were negative  CT head and chest x-ray were negative  Cardiology was consulted and she underwent stress test on 6/27/2023 which was negative for major reversible perfusion defects  Cardiology recommending outpatient extended Holter monitor and discontinuation of metoprolol in setting of marked bradycardia  She should follow-up with cardiology outpatient and wear compression stockings  Patient will be discharged home  Plan discussed with patient and all questions were answered  Please see above list of diagnoses and related plan for additional information  Condition at Discharge: stable    Discharge Day Visit / Exam:   Subjective: Reports she went for a walk this morning and when she was done she experienced chest pressure and a slight headache again  It had completely resolved at time of evaluation after she laid down  She denies current chest pain, shortness of breath, dizziness, palpitations, nausea, vomiting    Vitals: Blood Pressure: 138/60 (06/27/23 1205)  Pulse: (!) 51 (06/27/23 1205)  Temperature: 98 °F (36 7 °C) (06/27/23 "9316)  Temp Source: Oral (06/27/23 0707)  Respirations: 18 (06/27/23 0707)  Height: 5' 3\" (160 cm) (06/26/23 1400)  Weight - Scale: 75 9 kg (167 lb 5 3 oz) (06/26/23 1400)  SpO2: 94 % (06/27/23 1205)  Exam:   Physical Exam  Vitals and nursing note reviewed  Constitutional:       General: She is not in acute distress  Appearance: She is well-developed  Cardiovascular:      Rate and Rhythm: Regular rhythm  Bradycardia present  Heart sounds: No murmur heard  Pulmonary:      Effort: Pulmonary effort is normal  No respiratory distress  Breath sounds: Normal breath sounds  Abdominal:      Palpations: Abdomen is soft  Tenderness: There is no abdominal tenderness  Musculoskeletal:         General: No swelling  Skin:     General: Skin is warm and dry  Capillary Refill: Capillary refill takes less than 2 seconds  Neurological:      Mental Status: She is alert  Psychiatric:         Mood and Affect: Mood normal           Discussion with Family: Patient declined call to   Discharge instructions/Information to patient and family:   See after visit summary for information provided to patient and family  Provisions for Follow-Up Care:  See after visit summary for information related to follow-up care and any pertinent home health orders  Disposition:   Home    Planned Readmission: none     Discharge Statement:  I spent >30 minutes discharging the patient  This time was spent on the day of discharge  I had direct contact with the patient on the day of discharge  Greater than 50% of the total time was spent examining patient, answering all patient questions, arranging and discussing plan of care with patient as well as directly providing post-discharge instructions  Additional time then spent on discharge activities  Discharge Medications:  See after visit summary for reconciled discharge medications provided to patient and/or family        **Please Note: This " note may have been constructed using a voice recognition system**

## 2023-06-27 NOTE — PROGRESS NOTES
Progress Note - Cardiology   [facility]     Deja Miller 50 y o  female MRN: [id number]  : 1975  Unit/Bed#: [address] [place], [place] [id number] Encounter: [id number]    Assessment and Plan:   1  Chest pain       - Patient presented on 2023 for evaluation for syncope and chest pain  She denies chest pain is associated with shortness of breath or palpitations  She describes the chest pain as pressure localized to the center of her chest, she states it does not radiate  - 23 hs troponin: 4 (0hrs), 4 (2hrs), 6 (4 hrs)  - 23 EKG: Marked sinus bradycardia with occasional PVCs, rate 42 bpm   Nonspecific T wave abnormality  - 23 TTE: LVEF 45 to 50%  Systolic function is mildly reduced  Global longitudinal strain is normal   There is mild global hypokinesis  Diastolic function is normal for age  - Continue telemetry  - Will plan for nuclear stress test today, 23       2  Syncope      - She reports she was feeling fatigued/weak and nauseous on evening of 2023  Awoke on morning of 2023 feeling nauseous, lightheaded, and dizzy  She states that she was sitting at her desk this morning and next thing she knew she woke up on the ground  Patient is unsure how long she was unconscious  She states she had no prodrome  She states she when she awoke she had chest pain, lightheadedness, dizziness, and headache  She denies chest pain is associated with shortness of breath or palpitations   - Orthostatic vital signs negative  - 23 TTE: LVEF 45 to 50%  Systolic function is mildly reduced  Global longitudinal strain is normal   There is mild global hypokinesis  Diastolic function is normal for age  - Continue telemetry  - Will plan for nuclear stress test today, 23       3  Symptomatic bradycardia      - Patient has been bradycardic since presentation  - 23 EKG: Marked sinus bradycardia with occasional PVCs, rate 42 bpm   Nonspecific T wave abnormality    - 6/27/2023 EKG notes sinus bradycardia, ventricular rate 43 bpm, nonspecific T wave abnormalities  - Will plan to hold Toprol-XL 12 5 mg in setting of bradycardia  - Continue telemetry      4  Peripartum cardiomyopathy      - Patient with known history of peripartum cardiomyopathy, known history for approximately 15 years after the birth of her second child  Per patient her EF has remained stable over the years  - 10/10/2019 TTE: LVEF 40 to 45%  Moderately dilated left atrium  Mildly enlarged right atrium  Mild mitral valve regurgitation  Mild tricuspid valve regurgitation  Mildly elevated pulmonary artery pressure PASP calculated at 40 mmHg  Trace pulmonic valve regurgitation  - 6/26/23 BNP: 57    - 6/26/23 TTE: LVEF 45 to 50%  Systolic function is mildly reduced  Global longitudinal strain is normal   There is mild global hypokinesis  Diastolic function is normal for age      5  Hypertension      - BP acceptable  - Will plan to hold Toprol-XL 12 5 mg in setting of bradycardia   - Okay to continue home medication of lisinopril 5 mg daily  - Continue to monitor BP      6  PVCs     - Patient with documented history of PVCs  - 6/26/23 EKG: Marked sinus bradycardia with occasional PVCs, rate 42 bpm   Nonspecific T wave abnormality   - Will plan to hold  home medication Toprol-XL 12 5 mg in setting of bradycardia  8  Hypothyroidism      - 6/26/23 TSH: 1 748    - Review of home medications note patient is on levothyroxine 200 mcg daily and liothyronine 5 mcg daily       9  Demyelinating disease       - Evaluated by Neurology on 6/14/23 for concern for visual disturbances and hearing loss  She states she has had several months of worsening fatigue and muscle aches  Subjective / Objective:          Patient reporting episode chest pain this morning, has since resolved  6/27/2023 EKG notes sinus bradycardia, ventricular rate 43 bpm, nonspecific T wave abnormalities          Discussed with patient plan for "nuclear stress test today  Patient is agreeable  She currently denies chest pain, shortness of breath, palpitations, lightheadedness, dizziness, nausea, or vomiting  Patient disclosed her father suffered from autoimmune disorder that affected vagal nerve  Vitals: Blood pressure 137/66, pulse (!) 41, temperature 98 °F (36 7 °C), temperature source Oral, resp  rate 18, height 5' 3\" (1 6 m), weight 75 9 kg (167 lb 5 3 oz), SpO2 98 %  Vitals:    06/26/23 1112 06/26/23 1400   Weight: 75 9 kg (167 lb 6 4 oz) 75 9 kg (167 lb 5 3 oz)     Body mass index is 29 64 kg/m²  BP Readings from Last 3 Encounters:   06/27/23 137/66   06/14/23 110/56   05/24/23 122/70     Orthostatic Blood Pressures    Flowsheet Row Most Recent Value   Blood Pressure 137/66 filed at 06/27/2023 0707   Patient Position - Orthostatic VS Standing - Orthostatic VS filed at 06/26/2023 1224        I/O     None        Invasive Devices     Peripheral Intravenous Line  Duration           Peripheral IV 06/26/23 Left Antecubital <1 day                No intake or output data in the 24 hours ending 06/27/23 0741      Physical Exam:   Physical Exam  Vitals reviewed  Constitutional:       General: She is not in acute distress  Cardiovascular:      Rate and Rhythm: Regular rhythm  Bradycardia present  Pulses: Normal pulses  Heart sounds: Murmur heard  Pulmonary:      Effort: Pulmonary effort is normal  No respiratory distress  Abdominal:      General: Abdomen is flat  There is no distension  Palpations: Abdomen is soft  Tenderness: There is no abdominal tenderness  Musculoskeletal:      Right lower leg: No edema  Left lower leg: No edema  Skin:     General: Skin is warm and dry  Neurological:      Mental Status: She is alert and oriented to person, place, and time         Medications/ Allergies:     Current Facility-Administered Medications   Medication Dose Route Frequency Provider Last Rate   • albuterol  2 puff " Inhalation 4x Daily PRN Amy Velarde PA-C     • busPIRone  5 mg Oral BID Amy Velarde PA-C     • diphenhydrAMINE  25 mg Intravenous Q8H PRN Leela Jaffe MD     • enoxaparin  40 mg Subcutaneous Daily Amy Velarde PA-C     • ketorolac  15 mg Intravenous Q8H Leela Jaffe MD     • levothyroxine  200 mcg Oral Early Morning Amy Velarde PA-C     • liothyronine  5 mcg Oral Daily Amy Velarde PA-C     • lisinopril  5 mg Oral Daily Amy Velarde PA-C     • melatonin  6 mg Oral HS Leela Jaffe MD     • metoclopramide  5 mg Intravenous [facility] Leela Jaffe MD     • montelukast  10 mg Oral HS Amy Velarde PA-C       albuterol, 2 puff, 4x Daily PRN  diphenhydrAMINE, 25 mg, Q8H PRN      No Known Allergies    VTE Pharmacologic Prophylaxis:   Enoxaparin (Lovenox)    Labs:   Troponins:  Results from last 7 days   Lab Units 06/26/23  1649 06/26/23  1359   HSTNI D2 ng/L  --  0   HSTNI D4 ng/L 2  --          CBC with diff:  Results from last 7 days   Lab Units 06/26/23  1151   WBC Thousand/uL 5 87   HEMOGLOBIN g/dL 11 2*   HEMATOCRIT % 34 8   MCV fL 90   PLATELETS Thousands/uL 183   RBC Million/uL 3 88   MCH pg 28 9   MCHC g/dL 32 2   RDW % 14 1   MPV fL 11 6   NRBC AUTO /100 WBCs 0       CMP:  Results from last 7 days   Lab Units 06/26/23  1151   SODIUM mmol/L 137   POTASSIUM mmol/L 4 4   CHLORIDE mmol/L 108   CO2 mmol/L 24   ANION GAP mmol/L 5   BUN mg/dL 14   CREATININE mg/dL 0 74   CALCIUM mg/dL 8 9   AST U/L 30   ALT U/L 17   ALK PHOS U/L 77   TOTAL PROTEIN g/dL 6 9   ALBUMIN g/dL 4 1   TOTAL BILIRUBIN mg/dL 0 83   EGFR ml/min/1 73sq m 95       Magnesium:    Coags:  Results from last 7 days   Lab Units 06/26/23  1151   PTT seconds 29   INR  1 08     TSH:  Results from last 7 days   Lab Units 06/26/23  1151   TSH 3RD GENERATON uIU/mL 1 748     Imaging & Testing   I have personally reviewed pertinent reports  XR chest 1 view portable     Result Date: 6/26/2023     Impression: No acute cardiopulmonary disease     CT head without contrast     Result Date: 6/26/2023     Impression: No acute intracranial abnormality  EKG / Monitor: Personally reviewed  Telemetry reviewed: Currently sinus bradycardia, ventricular rate 42 bpm   Heart rate in 30s overnight  No arrhythmias noted  Cardiac testing:   No results found for this or any previous visit        Heri Pedersen PA-C

## 2023-06-27 NOTE — CASE MANAGEMENT
Case Management Assessment & Discharge Planning Note    Patient name Ralph Liang  Location [address] [address]/[address] MRN [id number]  : 1975 Date 2023       Current Admission Date: 2023  Current Admission Diagnosis:Symptomatic bradycardia   Patient Active Problem List    Diagnosis Date Noted   • Symptomatic bradycardia 2023   • Peripartum cardiomyopathy 2023   • Interstitial cystitis 2023   • S/P bariatric surgery 2021   • Anxiety 2020   • Essential hypertension 2020   • Screening, lipid 2020   • Hypothyroidism 2020   • Allergies 2020   • BMI 32 0-32 9,adult 2020   • Iron deficiency anemia 2020   • Mild intermittent asthma 2020   • High risk medication use 2020   • Cardiomyopathy ([address] ) 2020   • Absolute anemia 2012   • Adjustment reaction with prolonged depressive reaction 2012   • Tarsal tunnel syndrome 2012   • Persistent insomnia of non-organic origin 2012      LOS (days): 0  Geometric Mean LOS (GMLOS) (days):   Days to GMLOS:     OBJECTIVE:           Current admission status: Observation       Preferred Pharmacy:   [facility] [address] CHIANG [facility]  [phone] [id number]  Phone: [phone] Fax: [phone]    Primary Care Provider: Cheryle Dienes, DO    Primary Insurance: Daron 35  Secondary Insurance:     ASSESSMENT:  Billy 26 Proxies    There are no active Health Care Proxies on file  Readmission Root Cause  30 Day Readmission: No    Patient Information  Admitted from[de-identified] Home  Mental Status: Alert  During Assessment patient was accompanied by: Not accompanied during assessment  Assessment information provided by[de-identified] Patient  Primary Caregiver: Self  Support Systems: Son, Family members  [place] of Residence: [address] [place] [address] do you live in?: [address] [place] [address] entry access options   Select all that apply : No steps to enter home  Type of Current Residence: 2 story home  Upon entering residence, is there a bedroom on the main floor (no further steps)?: No  A bedroom is located on the following floor levels of residence (select all that apply):: 2nd Floor  Upon entering residence, is there a bathroom on the main floor (no further steps)?: No  Indicate which floors of current residence have a bathroom (select all the apply):: 2nd Floor  Number of steps to 2nd floor from main floor: One Flight  In the last 12 months, was there a time when you were not able to pay the mortgage or rent on time?: No  In the last 12 months, how many places have you lived?: 1  In the last 12 months, was there a time when you did not have a steady place to sleep or slept in a shelter (including now)?: No  Homeless/housing insecurity resource given?: N/A  Living Arrangements: Lives w/ Son    Activities of Daily Living Prior to Admission  Functional Status: Independent  Completes ADLs independently?: Yes  Ambulates independently?: Yes  Does patient use assisted devices?: No  Does patient currently own DME?: No  Does patient have a history of Outpatient Therapy (PT/OT)?: No  Does the patient have a history of Short-Term Rehab?: No  Does patient have a history of HHC?: No  Does patient currently have [facility]?: No    Patient Information Continued  Income Source: Employed (Patient currently works from home )  Does patient have prescription coverage?: Yes  Within the past 12 months, you worried that your food would run out before you got the money to buy more : Never true  Within the past 12 months, the food you bought just didn't last and you didn't have money to get more : Never true  Food insecurity resource given?: N/A  Does patient receive dialysis treatments?: No    Means of Transportation  Means of Transport to Appts[de-identified] Drives Self  In the past 12 months, has lack of transportation kept you from medical appointments or from getting medications?: No  In the past 12 months, has lack of transportation kept you from meetings, work, or from getting things needed for daily living?: No  Was application for public transport provided?: N/A        DISCHARGE DETAILS:    Discharge planning discussed with[de-identified] Patient  Freedom of Choice: Yes
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
## 2023-06-28 ENCOUNTER — TELEPHONE (OUTPATIENT)
Dept: CARDIOLOGY CLINIC | Facility: CLINIC | Age: 48
End: 2023-06-28

## 2023-06-28 LAB
ATRIAL RATE: 62 BPM
P AXIS: 78 DEGREES
PR INTERVAL: 174 MS
QRS AXIS: 76 DEGREES
QRSD INTERVAL: 92 MS
QT INTERVAL: 380 MS
QTC INTERVAL: 385 MS
T WAVE AXIS: -26 DEGREES
VENTRICULAR RATE: 62 BPM

## 2023-06-28 PROCEDURE — 93010 ELECTROCARDIOGRAM REPORT: CPT | Performed by: INTERNAL MEDICINE

## 2023-06-28 NOTE — TELEPHONE ENCOUNTER
We can definitely consider that  I recommend she set up a close follow-up with Dr Kumar Ck   We will need to document to get insurance approval of appropriate test

## 2023-06-28 NOTE — TELEPHONE ENCOUNTER
Spoke with pt, notified her of below info   Registered monitor, and transferred to  to schedule apt with dr Annie Burton

## 2023-06-28 NOTE — TELEPHONE ENCOUNTER
Pt called states she was just discharged by dr Roberto Carlos Bass  Recommended to have holter monitor  She would like to have a loop inserted instead due to her hx  pls advise

## 2023-06-29 ENCOUNTER — TELEPHONE (OUTPATIENT)
Dept: FAMILY MEDICINE CLINIC | Facility: CLINIC | Age: 48
End: 2023-06-29

## 2023-06-29 NOTE — TELEPHONE ENCOUNTER
----- Message from Charles Sierra PA-C sent at 6/27/2023  3:28 PM EDT -----  Regarding: [facility] Discharge  Thank you for allowing us to participate in the care of your patient, Dejon Gomez, who was hospitalized from 6/26/2023 through 6/27/2023 with the admitting diagnosis of symptomatic bradycardia  Presented after syncopal episode at home with dizziness, nausea, chest pressure  EKG showed sinus bradycardia and her troponins were negative  CT head and chest x-ray were negative  Cardiology was consulted and she underwent stress test on 6/27/2023 which was negative for major reversible perfusion defects  Cardiology recommending outpatient extended Holter monitor and discontinuation of metoprolol in setting of marked bradycardia  She should follow-up with cardiology outpatient and wear compression stockings  If you have any additional questions or would like to discuss further, please feel free to contact me      [facility], [facility]ist  [phone]

## 2023-07-07 ENCOUNTER — TELEPHONE (OUTPATIENT)
Dept: NEUROLOGY | Facility: CLINIC | Age: 48
End: 2023-07-07

## 2023-07-07 NOTE — TELEPHONE ENCOUNTER
Yamil Orr from [facility]  called and is requesting a prior auth for patients MRI's. Her appointment is July 19th.     Fax: [phone]  Call [phone]

## 2023-07-17 ENCOUNTER — OFFICE VISIT (OUTPATIENT)
Dept: CARDIOLOGY CLINIC | Facility: CLINIC | Age: 48
End: 2023-07-17
Payer: COMMERCIAL

## 2023-07-17 VITALS
BODY MASS INDEX: 28.88 KG/M2 | WEIGHT: 163 LBS | HEART RATE: 72 BPM | DIASTOLIC BLOOD PRESSURE: 60 MMHG | SYSTOLIC BLOOD PRESSURE: 120 MMHG | HEIGHT: 63 IN | OXYGEN SATURATION: 100 %

## 2023-07-17 DIAGNOSIS — I49.3 FREQUENT PVCS: ICD-10-CM

## 2023-07-17 DIAGNOSIS — R55 SYNCOPE, UNSPECIFIED SYNCOPE TYPE: Primary | ICD-10-CM

## 2023-07-17 DIAGNOSIS — I10 ESSENTIAL HYPERTENSION: ICD-10-CM

## 2023-07-17 PROCEDURE — 99214 OFFICE O/P EST MOD 30 MIN: CPT | Performed by: INTERNAL MEDICINE

## 2023-07-17 NOTE — PROGRESS NOTES
Cardiology   Emily Morel DO, Julio Gibson MD, Sha Allen MD, Conrad Patel MD, [address] [facility]  -------------------------------------------------------------------  [facility]  [address] [facility], [address] [place] [phone]    Cardiology Follow Up  Drea Recinos  1975  [id number]          Assessment/Plan:    1. Syncope, unspecified syncope type    2. Peripartum cardiomyopathy    3. Frequent PVCs    4. Essential hypertension      -Patient with episode of syncope of unclear etiology. It was preceded by nausea and lightheadedness. She had 2-week monitor afterwards which she was able to wear for 1 week. Results are pending. We will review results of monitor once available and consider implantation of loop recorder afterwards. - Echocardiogram was reviewed. Ejection fraction was 45%. Metoprolol has been discontinued due to bradycardia. - May also consider reevaluation by her previous cardiologist/EP, Dr. Frederick Cisneros in [place]. - Continue lisinopril - consider switch to Entresto. - Will discuss further once monitor results received. Interval History:     Drea Recinos is 50 y.o. female here for followup of recent hospitalization. Patient had a syncopal event at home. She was feeling fatigued, weak and nauseous starting the evening prior to the event. In the morning, she felt lightheaded and dizzy and had a loss of consciousness while she was sitting at her desk. She did not suffer any injury. When she awoke, she felt some chest tightness along with dizziness. Work-up in the emergency room was negative except for an ECG which showed a rate of 42 bpm with PVCs. She wore an event monitor for a week afterwards. Results are unavailable at this time. A stress test was done during hospitalization which showed an appropriate heart rate response.   Since hospitalization, she has felt well without any further episodes. She does feel palpitations intermittently but denies any chest pain or shortness of breath or any further syncope or near syncope episodes. Metoprolol was discontinued during hospitalization. The patient has a history of peripartum cardiomyopathy. Patient was diagnosed with peripartum cardiomyopathy after the birth of her second child over 15 years ago. After baby was born, ECG was abnormal and echocardiogram showed an ejection fraction of 40%. Ejection fraction has remained stable over the years. She did not require any hospitalization for congestive heart failure in the past.  She also has a history of frequent PVCs.            The following portions of the patient's history were reviewed and updated as appropriate: allergies, current medications, past family history, past medical history, past social history, past surgical history, and problem list.       Current Outpatient Medications:   •  albuterol (Proventil HFA) 90 mcg/act inhaler, Inhale 2 puffs 4 (four) times a day as needed for wheezing, Disp: 1 g, Rfl: 0  •  busPIRone (BUSPAR) 5 mg tablet, Take 1 tablet (5 mg total) by mouth 2 (two) times a day, Disp: 60 tablet, Rfl: 5  •  cholecalciferol (VITAMIN D3) 25 mcg (1,000 units) tablet, , Disp: , Rfl:   •  levothyroxine 200 mcg tablet, Take 1 tablet by mouth once daily, Disp: 90 tablet, Rfl: 0  •  liothyronine (CYTOMEL) 5 mcg tablet, Take 1 tablet (5 mcg total) by mouth daily, Disp: 90 tablet, Rfl: 0  •  lisinopril (ZESTRIL) 5 mg tablet, Take 1 tablet (5 mg total) by mouth daily, Disp: 90 tablet, Rfl: 1  •  LORazepam (ATIVAN) 1 mg tablet, Take 1 tablet (1 mg total) by mouth daily as needed for anxiety, Disp: 30 tablet, Rfl: 0  •  montelukast (SINGULAIR) 10 mg tablet, Take 1 tablet by mouth once daily, Disp: 90 tablet, Rfl: 0  •  omeprazole (PriLOSEC) 40 MG capsule, Take 1 capsule (40 mg total) by mouth daily, Disp: 30 capsule, Rfl: 2  •  traZODone (DESYREL) 50 mg tablet, Take 1 tablet (50 mg total) by mouth daily, Disp: 30 tablet, Rfl: 0  •  vitamin B-12 (VITAMIN B-12) 1,000 mcg tablet, Take by mouth daily, Disp: , Rfl:   •  Na Sulfate-K Sulfate-Mg Sulf 17.5-3.13-1.6 GM/177ML SOLN, Take 1 kit by mouth once for 1 dose (Patient not taking: Reported on 7/17/2023), Disp: 354 mL, Rfl: 0        Review of Systems:  Review of Systems   Respiratory: Negative for shortness of breath. Cardiovascular: Negative for chest pain, palpitations and leg swelling. Neurological: Positive for syncope. All other systems reviewed and are negative. Physical Exam:  Vitals:  Vitals:    07/17/23 0903   BP: 120/60   BP Location: Right arm   Patient Position: Sitting   Cuff Size: Standard   Pulse: 72   SpO2: 100%   Weight: 73.9 kg (163 lb)   Height: 5' 3" (1.6 m)     Physical Exam   Constitutional: She appears healthy. No distress. Eyes: Pupils are equal, round, and reactive to light. Conjunctivae are normal.   Neck: No JVD present. Cardiovascular: Normal rate, regular rhythm and normal heart sounds. Exam reveals no gallop and no friction rub. No murmur heard. Pulmonary/Chest: Effort normal and breath sounds normal. She has no wheezes. She has no rales. Musculoskeletal:         General: No tenderness, deformity or edema. Cervical back: Normal range of motion and neck supple. Neurological: She is alert and oriented to person, place, and time. Skin: Skin is warm and dry. Cardiographics:  See HPI  Last known EF: 45%    This note was completed in part utilizing [employer] Direct Software. Grammatical errors, random word insertions, spelling mistakes, and incomplete sentences can be an occasional consequence of this system secondary to software limitations, ambient noise, and hardware issues. If you have any questions or concerns about the content, text, or information contained within the body of this dictation, please contact the provider for clarification.

## 2023-07-19 DIAGNOSIS — M54.50 LOW BACK PAIN: Primary | ICD-10-CM

## 2023-07-20 ENCOUNTER — CLINICAL SUPPORT (OUTPATIENT)
Dept: CARDIOLOGY CLINIC | Facility: CLINIC | Age: 48
End: 2023-07-20
Payer: COMMERCIAL

## 2023-07-20 DIAGNOSIS — R55 SYNCOPE AND COLLAPSE: ICD-10-CM

## 2023-07-20 DIAGNOSIS — R00.1 SYMPTOMATIC BRADYCARDIA: ICD-10-CM

## 2023-07-20 PROCEDURE — 93248 EXT ECG>7D<15D REV&INTERPJ: CPT | Performed by: INTERNAL MEDICINE

## 2023-07-20 NOTE — PROGRESS NOTES
End of service summary as per Dr. Julianne Beltre. / forwarded to Dr. Ena Burks as she was last seen by him.

## 2023-07-26 ENCOUNTER — OFFICE VISIT (OUTPATIENT)
Dept: FAMILY MEDICINE CLINIC | Facility: CLINIC | Age: 48
End: 2023-07-26
Payer: COMMERCIAL

## 2023-07-26 VITALS
BODY MASS INDEX: 28.87 KG/M2 | SYSTOLIC BLOOD PRESSURE: 120 MMHG | TEMPERATURE: 97.3 F | DIASTOLIC BLOOD PRESSURE: 68 MMHG | WEIGHT: 163 LBS | RESPIRATION RATE: 16 BRPM | HEART RATE: 82 BPM

## 2023-07-26 DIAGNOSIS — Z11.4 SCREENING FOR HIV (HUMAN IMMUNODEFICIENCY VIRUS): ICD-10-CM

## 2023-07-26 DIAGNOSIS — S80.11XA CONTUSION OF RIGHT LEG, INITIAL ENCOUNTER: Primary | ICD-10-CM

## 2023-07-26 DIAGNOSIS — M79.606 PAIN OF LOWER EXTREMITY, UNSPECIFIED LATERALITY: ICD-10-CM

## 2023-07-26 DIAGNOSIS — Z72.51 HIGH RISK HETEROSEXUAL BEHAVIOR: ICD-10-CM

## 2023-07-26 PROCEDURE — 99214 OFFICE O/P EST MOD 30 MIN: CPT | Performed by: FAMILY MEDICINE
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
## 2023-07-28 DIAGNOSIS — F41.9 ANXIETY: ICD-10-CM

## 2023-07-31 RX ORDER — TRAZODONE HYDROCHLORIDE 50 MG/1
50 TABLET ORAL DAILY
Qty: 30 TABLET | Refills: 0 | Status: SHIPPED | OUTPATIENT
Start: 2023-07-31

## 2023-08-10 LAB
HCV AB S/CO SERPL IA: NON REACTIVE
HIV 1+2 AB+HIV1 P24 AG SERPL QL IA: NON REACTIVE

## 2023-08-21 ENCOUNTER — TELEPHONE (OUTPATIENT)
Dept: OTHER | Facility: OTHER | Age: 48
End: 2023-08-21

## 2023-08-29 DIAGNOSIS — F41.9 ANXIETY: ICD-10-CM

## 2023-08-30 RX ORDER — LORAZEPAM 1 MG/1
1 TABLET ORAL DAILY PRN
Qty: 30 TABLET | Refills: 0 | Status: SHIPPED | OUTPATIENT
Start: 2023-08-30

## 2023-08-30 RX ORDER — TRAZODONE HYDROCHLORIDE 50 MG/1
50 TABLET ORAL DAILY
Qty: 30 TABLET | Refills: 0 | Status: SHIPPED | OUTPATIENT
Start: 2023-08-30

## 2023-08-31 ENCOUNTER — HOSPITAL ENCOUNTER (OUTPATIENT)
Dept: RADIOLOGY | Facility: HOSPITAL | Age: 48
Discharge: HOME | End: 2023-08-31
Attending: FAMILY MEDICINE
Payer: COMMERCIAL

## 2023-08-31 ENCOUNTER — HOSPITAL ENCOUNTER (OUTPATIENT)
Dept: RADIOLOGY | Facility: HOSPITAL | Age: 48
Discharge: HOME | End: 2023-08-31
Attending: FAMILY MEDICINE

## 2023-08-31 ENCOUNTER — APPOINTMENT (OUTPATIENT)
Dept: RADIOLOGY | Facility: HOSPITAL | Age: 48
End: 2023-08-31
Attending: PSYCHIATRY & NEUROLOGY

## 2023-08-31 ENCOUNTER — APPOINTMENT (OUTPATIENT)
Dept: NON INVASIVE DIAGNOSTICS | Facility: HOSPITAL | Age: 48
End: 2023-08-31
Attending: INTERNAL MEDICINE

## 2023-08-31 ENCOUNTER — HOSPITAL ENCOUNTER (OUTPATIENT)
Dept: RADIOLOGY | Facility: HOSPITAL | Age: 48
Discharge: HOME | End: 2023-08-31
Attending: PSYCHIATRY & NEUROLOGY

## 2023-08-31 DIAGNOSIS — M79.606 PAIN OF LOWER EXTREMITY, UNSPECIFIED LATERALITY: ICD-10-CM

## 2023-08-31 DIAGNOSIS — G89.29 CHRONIC LOWER BACK PAIN: ICD-10-CM

## 2023-08-31 DIAGNOSIS — R92.8 ABNORMAL MAMMOGRAM: ICD-10-CM

## 2023-08-31 DIAGNOSIS — S80.11XA CONTUSION OF RIGHT LEG, INITIAL ENCOUNTER: ICD-10-CM

## 2023-08-31 DIAGNOSIS — R29.898 WEAKNESS OF BOTH LEGS: ICD-10-CM

## 2023-08-31 DIAGNOSIS — M54.50 CHRONIC LOWER BACK PAIN: ICD-10-CM

## 2023-08-31 PROCEDURE — 93970 EXTREMITY STUDY: CPT

## 2023-08-31 PROCEDURE — G0279 TOMOSYNTHESIS, MAMMO: HCPCS

## 2023-08-31 PROCEDURE — 77066 DX MAMMO INCL CAD BI: CPT

## 2023-08-31 PROCEDURE — G1004 CDSM NDSC: HCPCS

## 2023-08-31 PROCEDURE — 76642 ULTRASOUND BREAST LIMITED: CPT

## 2023-08-31 PROCEDURE — 93970 EXTREMITY STUDY: CPT | Performed by: SURGERY

## 2023-08-31 PROCEDURE — 72148 MRI LUMBAR SPINE W/O DYE: CPT

## 2023-09-06 ENCOUNTER — TELEPHONE (OUTPATIENT)
Dept: NEUROLOGY | Facility: CLINIC | Age: 48
End: 2023-09-06

## 2023-09-06 NOTE — TELEPHONE ENCOUNTER
Spoke to the patient and informed her of the MRI results. The patient wants to find out her next step regarding the cyst, the patient wants to no the long tern outcome of having the cyst and what options does she have for having the cyst removed. The patient also wants to no what problems can the cyst cause. The patient has also agrees to PT and pain management for her disc protrusion and canal narrowing. Please advise      ----- Message from Ava Drake MD sent at 9/5/2023  6:26 PM EDT -----  Please call the patient regarding her abnormal result. Patient has left L4-5 severe canal narrowing and disc protrusion along with right sided facet cyst at L5-S1. She needs to try PT and pain management. Ask if agreeable.

## 2023-09-10 ENCOUNTER — OFFICE VISIT (OUTPATIENT)
Dept: URGENT CARE | Facility: CLINIC | Age: 48
End: 2023-09-10
Payer: COMMERCIAL

## 2023-09-10 VITALS
HEART RATE: 66 BPM | DIASTOLIC BLOOD PRESSURE: 70 MMHG | BODY MASS INDEX: 28.92 KG/M2 | WEIGHT: 163.2 LBS | OXYGEN SATURATION: 100 % | HEIGHT: 63 IN | TEMPERATURE: 97 F | SYSTOLIC BLOOD PRESSURE: 100 MMHG | RESPIRATION RATE: 16 BRPM

## 2023-09-10 DIAGNOSIS — R31.9 URINARY TRACT INFECTION WITH HEMATURIA, SITE UNSPECIFIED: Primary | ICD-10-CM

## 2023-09-10 DIAGNOSIS — N39.0 URINARY TRACT INFECTION WITH HEMATURIA, SITE UNSPECIFIED: Primary | ICD-10-CM

## 2023-09-10 LAB
SL AMB  POCT GLUCOSE, UA: NEGATIVE
SL AMB LEUKOCYTE ESTERASE,UA: ABNORMAL
SL AMB POCT BILIRUBIN,UA: NEGATIVE
SL AMB POCT BLOOD,UA: ABNORMAL
SL AMB POCT CLARITY,UA: ABNORMAL
SL AMB POCT COLOR,UA: ABNORMAL
SL AMB POCT KETONES,UA: NEGATIVE
SL AMB POCT NITRITE,UA: NEGATIVE
SL AMB POCT PH,UA: 6
SL AMB POCT SPECIFIC GRAVITY,UA: 1
SL AMB POCT URINE PROTEIN: NEGATIVE
SL AMB POCT UROBILINOGEN: 0.2

## 2023-09-10 PROCEDURE — 81002 URINALYSIS NONAUTO W/O SCOPE: CPT | Performed by: PHYSICIAN ASSISTANT

## 2023-09-10 PROCEDURE — 99203 OFFICE O/P NEW LOW 30 MIN: CPT | Performed by: PHYSICIAN ASSISTANT

## 2023-09-10 RX ORDER — CEPHALEXIN 500 MG/1
500 CAPSULE ORAL EVERY 12 HOURS SCHEDULED
Qty: 10 CAPSULE | Refills: 0 | Status: SHIPPED | OUTPATIENT
Start: 2023-09-10 | End: 2023-09-15

## 2023-09-10 RX ORDER — PHENAZOPYRIDINE HYDROCHLORIDE 200 MG/1
200 TABLET, FILM COATED ORAL
Qty: 6 TABLET | Refills: 0 | Status: SHIPPED | OUTPATIENT
Start: 2023-09-10 | End: 2023-09-12

## 2023-09-10 RX ORDER — FLUCONAZOLE 150 MG/1
150 TABLET ORAL ONCE
Qty: 1 TABLET | Refills: 0 | Status: SHIPPED | OUTPATIENT
Start: 2023-09-10 | End: 2023-09-10

## 2023-09-10 NOTE — PROGRESS NOTES
[facility]        NAME: Farhana Mckenzie is a 50 y.o. female  : 1975    MRN: [id number]  DATE: September 10, 2023  TIME: 9:47 AM    Assessment and Plan   Urinary tract infection with hematuria, site unspecified [N39.0, R31.9]  1. Urinary tract infection with hematuria, site unspecified  POCT urine dip    Urine culture    cephalexin (KEFLEX) 500 mg capsule    phenazopyridine (PYRIDIUM) 200 mg tablet    fluconazole (DIFLUCAN) 150 mg tablet        Push fluids. Discussed strict return to care precautions as well as red flag symptoms which should prompt immediate ED referral. Pt verbalized understanding and is in agreement with plan. Please follow up with your primary care provider within the next week. Please remember that your visit today was with an urgent care provider and should not replace follow up with your primary care provider for chronic medical issues or annual physicals. Patient Instructions       Follow up with PCP in 3-5 days. Proceed to  ER if symptoms worsen. Chief Complaint     Chief Complaint   Patient presents with   • Burning Sensation     Burning sensation and pinkish urine which started last night         History of Present Illness       Pt is a(n) 50 y.o. female with pmh cardiomyopathy, interstitial cystitis who presents out of concern for a UTI. Dysuria: Yes  Urgency: Yes  Frequency: Yes  Hematuria: Yes  Cloudy/malodorous urine: No  Vaginal discharge/itching: No  Concerns for STD: No  Possibility of pregnancy: No  LMP: No LMP recorded. Patient has had a hysterectomy. Fever: No but had chills  Flank pain: No  Nausea/vomiting: No  Previous UTIs: Yes        Review of Systems   Review of Systems   Constitutional: Positive for chills. Negative for diaphoresis and fever. Respiratory: Negative for shortness of breath. Cardiovascular: Negative for chest pain. Gastrointestinal: Negative for abdominal pain, nausea and vomiting.    Genitourinary: Positive for dysuria, frequency and urgency. Negative for flank pain and hematuria. Musculoskeletal: Negative for back pain and myalgias. Psychiatric/Behavioral: Negative for confusion.          Current Medications       Current Outpatient Medications:   •  albuterol (Proventil HFA) 90 mcg/act inhaler, Inhale 2 puffs 4 (four) times a day as needed for wheezing, Disp: 1 g, Rfl: 0  •  busPIRone (BUSPAR) 5 mg tablet, Take 1 tablet (5 mg total) by mouth 2 (two) times a day, Disp: 60 tablet, Rfl: 5  •  cephalexin (KEFLEX) 500 mg capsule, Take 1 capsule (500 mg total) by mouth every 12 (twelve) hours for 5 days, Disp: 10 capsule, Rfl: 0  •  cholecalciferol (VITAMIN D3) 25 mcg (1,000 units) tablet, , Disp: , Rfl:   •  fluconazole (DIFLUCAN) 150 mg tablet, Take 1 tablet (150 mg total) by mouth once for 1 dose, Disp: 1 tablet, Rfl: 0  •  levothyroxine 200 mcg tablet, Take 1 tablet by mouth once daily, Disp: 90 tablet, Rfl: 0  •  liothyronine (CYTOMEL) 5 mcg tablet, Take 1 tablet (5 mcg total) by mouth daily, Disp: 90 tablet, Rfl: 0  •  lisinopril (ZESTRIL) 5 mg tablet, Take 1 tablet (5 mg total) by mouth daily, Disp: 90 tablet, Rfl: 1  •  LORazepam (ATIVAN) 1 mg tablet, Take 1 tablet (1 mg total) by mouth daily as needed for anxiety, Disp: 30 tablet, Rfl: 0  •  montelukast (SINGULAIR) 10 mg tablet, Take 1 tablet by mouth once daily, Disp: 90 tablet, Rfl: 0  •  omeprazole (PriLOSEC) 40 MG capsule, Take 1 capsule (40 mg total) by mouth daily, Disp: 30 capsule, Rfl: 2  •  phenazopyridine (PYRIDIUM) 200 mg tablet, Take 1 tablet (200 mg total) by mouth 3 (three) times a day with meals for 2 days, Disp: 6 tablet, Rfl: 0  •  traZODone (DESYREL) 50 mg tablet, Take 1 tablet (50 mg total) by mouth daily, Disp: 30 tablet, Rfl: 0  •  vitamin B-12 (VITAMIN B-12) 1,000 mcg tablet, Take by mouth daily, Disp: , Rfl:   •  Na Sulfate-K Sulfate-Mg Sulf 17.5-3.13-1.6 GM/177ML SOLN, Take 1 kit by mouth once for 1 dose (Patient not taking: Reported on 7/17/2023), Disp: 354 mL, Rfl: 0    Current Allergies     Allergies as of 09/10/2023   • (No Known Allergies)            The following portions of the patient's history were reviewed and updated as appropriate: allergies, current medications, past family history, past medical history, past social history, past surgical history and problem list.     Past Medical History:   Diagnosis Date   • Allergic    • Anxiety    • Asthma    • Cardiomyopathy ([address])     bradycardia normal 50-55 bpm   • Depression    • Disease of thyroid gland    • Interstitial cystitis        Past Surgical History:   Procedure Laterality Date   • BARIATRIC SURGERY      x2 surgeries lap band and bypass   • BREAST BIOPSY Left     usg bx negative   •  SECTION      x2   • EAR RECONSTRUCTION Left     age 4 skin drum skin graft   • HYSTERECTOMY         Family History   Problem Relation Age of Onset   • Cancer Mother    • Cervical cancer Mother    • Arthritis Mother    • Asthma Father    • Arthritis Father    • Breast cancer Cousin         age unknown         Medications have been verified. Objective   /70 (BP Location: Left arm, Patient Position: Sitting, Cuff Size: Adult)   Pulse 66   Temp (!) 97 °F (36.1 °C)   Resp 16   Ht 5' 3" (1.6 m)   Wt 74 kg (163 lb 3.2 oz)   SpO2 100%   BMI 28.91 kg/m²        Physical Exam     Physical Exam  Vitals and nursing note reviewed. Constitutional:       General: She is not in acute distress. Appearance: Normal appearance. She is not ill-appearing. HENT:      Head: Normocephalic and atraumatic. Cardiovascular:      Rate and Rhythm: Normal rate and regular rhythm. Heart sounds: Normal heart sounds. Pulmonary:      Effort: Pulmonary effort is normal. No respiratory distress. Breath sounds: Normal breath sounds. No wheezing, rhonchi or rales. Abdominal:      General: Abdomen is flat. Palpations: Abdomen is soft. Tenderness: There is no abdominal tenderness.  There is no right CVA tenderness, left CVA tenderness or guarding. Skin:     General: Skin is warm and dry. Capillary Refill: Capillary refill takes less than 2 seconds. Neurological:      Mental Status: She is alert and oriented to person, place, and time.    Psychiatric:         Behavior: Behavior normal.

## 2023-09-12 LAB
BACTERIA UR CULT: NORMAL
Lab: NO GROWTH

## 2023-09-25 ENCOUNTER — TELEPHONE (OUTPATIENT)
Dept: PAIN MEDICINE | Facility: CLINIC | Age: 48
End: 2023-09-25

## 2023-09-28 DIAGNOSIS — F41.9 ANXIETY: ICD-10-CM

## 2023-09-29 RX ORDER — TRAZODONE HYDROCHLORIDE 50 MG/1
50 TABLET ORAL DAILY
Qty: 30 TABLET | Refills: 0 | Status: SHIPPED | OUTPATIENT
Start: 2023-09-29

## 2023-10-31 DIAGNOSIS — F41.9 ANXIETY: ICD-10-CM

## 2023-10-31 DIAGNOSIS — I10 ESSENTIAL HYPERTENSION: ICD-10-CM

## 2023-10-31 DIAGNOSIS — E03.9 HYPOTHYROIDISM, UNSPECIFIED TYPE: ICD-10-CM

## 2023-10-31 DIAGNOSIS — T78.40XA ALLERGY, INITIAL ENCOUNTER: ICD-10-CM

## 2023-10-31 RX ORDER — MONTELUKAST SODIUM 10 MG/1
10 TABLET ORAL DAILY
Qty: 90 TABLET | Refills: 1 | Status: SHIPPED | OUTPATIENT
Start: 2023-10-31

## 2023-10-31 RX ORDER — LISINOPRIL 5 MG/1
5 TABLET ORAL DAILY
Qty: 90 TABLET | Refills: 1 | Status: SHIPPED | OUTPATIENT
Start: 2023-10-31

## 2023-10-31 RX ORDER — TRAZODONE HYDROCHLORIDE 50 MG/1
50 TABLET ORAL DAILY
Qty: 90 TABLET | Refills: 1 | Status: SHIPPED | OUTPATIENT
Start: 2023-10-31

## 2023-10-31 RX ORDER — LIOTHYRONINE SODIUM 5 UG/1
5 TABLET ORAL DAILY
Qty: 90 TABLET | Refills: 1 | Status: SHIPPED | OUTPATIENT
Start: 2023-10-31

## 2023-10-31 RX ORDER — LEVOTHYROXINE SODIUM 0.2 MG/1
200 TABLET ORAL DAILY
Qty: 90 TABLET | Refills: 1 | Status: SHIPPED | OUTPATIENT
Start: 2023-10-31

## 2023-11-13 ENCOUNTER — TELEPHONE (OUTPATIENT)
Dept: NEUROLOGY | Facility: CLINIC | Age: 48
End: 2023-11-13

## 2023-11-13 ENCOUNTER — TELEPHONE (OUTPATIENT)
Dept: FAMILY MEDICINE CLINIC | Facility: CLINIC | Age: 48
End: 2023-11-13

## 2023-11-14 ENCOUNTER — TELEPHONE (OUTPATIENT)
Dept: NEUROLOGY | Facility: CLINIC | Age: 48
End: 2023-11-14

## 2023-11-14 LAB
25(OH)D3+25(OH)D2 SERPL-MCNC: 28.8 NG/ML (ref 30–100)
ALBUMIN SERPL ELPH-MCNC: 4.1 G/DL (ref 2.9–4.4)
ALBUMIN/GLOB SERPL: 1.4 {RATIO} (ref 0.7–1.7)
ALPHA1 GLOB SERPL ELPH-MCNC: 0.1 G/DL (ref 0–0.4)
ALPHA2 GLOB SERPL ELPH-MCNC: 0.6 G/DL (ref 0.4–1)
B BURGDOR AB SER IA.MTTT-IMP: ABNORMAL
B BURGDOR IGG SERPL QL IA: POSITIVE
B BURGDOR IGG+IGM SER QL IA: POSITIVE
B BURGDOR IGM SERPL QL IA: NEGATIVE
B-GLOBULIN SERPL ELPH-MCNC: 0.9 G/DL (ref 0.7–1.3)
CK SERPL-CCNC: 101 U/L (ref 32–182)
CRP SERPL-MCNC: <1 MG/L (ref 0–10)
ERYTHROCYTE [SEDIMENTATION RATE] IN BLOOD BY WESTERGREN METHOD: 2 MM/HR (ref 0–40)
GAMMA GLOB SERPL ELPH-MCNC: 1.2 G/DL (ref 0.4–1.8)
GLOBULIN SER CALC-MCNC: 2.9 G/DL (ref 2.2–3.9)
LABORATORY COMMENT REPORT: NORMAL
M PROTEIN SERPL ELPH-MCNC: NORMAL G/DL
PROT SERPL-MCNC: 7 G/DL (ref 6–8.5)
VIT B12 SERPL-MCNC: 604 PG/ML (ref 232–1245)

## 2023-11-14 NOTE — TELEPHONE ENCOUNTER
[facility] requesting the patient contact the office to discuss her BW results. ----- Message from Aurea Bob MD sent at 11/14/2023  3:31 PM EST -----  Please call the patient regarding her abnormal result. Her vit D level is borderline low. She can take over the counter D3 5000 units 2-3x/week. Her lyme antibody is IgG, old exposure so not concerning.

## 2023-11-14 NOTE — TELEPHONE ENCOUNTER
LMOM to change appointment that was scheduled. As per Dr. Gaye Kern, patient needs a 30 MINUTE appointment.

## 2023-11-15 ENCOUNTER — TELEPHONE (OUTPATIENT)
Dept: NEUROLOGY | Facility: CLINIC | Age: 48
End: 2023-11-15

## 2023-11-15 ENCOUNTER — OFFICE VISIT (OUTPATIENT)
Dept: FAMILY MEDICINE CLINIC | Facility: CLINIC | Age: 48
End: 2023-11-15
Payer: COMMERCIAL

## 2023-11-15 VITALS
BODY MASS INDEX: 29.94 KG/M2 | TEMPERATURE: 98.7 F | HEART RATE: 76 BPM | WEIGHT: 169 LBS | SYSTOLIC BLOOD PRESSURE: 130 MMHG | RESPIRATION RATE: 16 BRPM | DIASTOLIC BLOOD PRESSURE: 74 MMHG

## 2023-11-15 DIAGNOSIS — E03.9 HYPOTHYROIDISM, UNSPECIFIED TYPE: ICD-10-CM

## 2023-11-15 DIAGNOSIS — E55.9 VITAMIN D DEFICIENCY: ICD-10-CM

## 2023-11-15 DIAGNOSIS — D64.9 ANEMIA, UNSPECIFIED TYPE: Primary | ICD-10-CM

## 2023-11-15 DIAGNOSIS — G89.29 OTHER CHRONIC PAIN: ICD-10-CM

## 2023-11-15 PROCEDURE — 99214 OFFICE O/P EST MOD 30 MIN: CPT | Performed by: FAMILY MEDICINE

## 2023-11-15 RX ORDER — ERGOCALCIFEROL 1.25 MG/1
50000 CAPSULE ORAL WEEKLY
Qty: 8 CAPSULE | Refills: 0 | Status: SHIPPED | OUTPATIENT
Start: 2023-11-15

## 2023-11-15 RX ORDER — LEVOTHYROXINE SODIUM 0.05 MG/1
50 TABLET ORAL
Qty: 90 TABLET | Refills: 3 | Status: SHIPPED | OUTPATIENT
Start: 2023-11-15

## 2023-11-15 RX ORDER — MELOXICAM 15 MG/1
15 TABLET ORAL DAILY
Qty: 90 TABLET | Refills: 0 | Status: SHIPPED | OUTPATIENT
Start: 2023-11-15 | End: 2024-02-13

## 2023-11-15 NOTE — PROGRESS NOTES
Assessment/Plan:    1. Anemia, unspecified type  -     CBC and differential; Future  -     Iron Panel (Includes Ferritin, Iron Sat%, Iron, and TIBC); Future  -     Folate; Future  -     Vitamin B12; Future  -     CBC and differential  -     Folate  -     Vitamin B12    2. Vitamin D deficiency  -     ergocalciferol (ERGOCALCIFEROL) 1.25 MG (91040 UT) capsule; Take 1 capsule (50,000 Units total) by mouth once a week    3. Other chronic pain  -     meloxicam (MOBIC) 15 mg tablet; Take 1 tablet (15 mg total) by mouth daily As needed    4. Hypothyroidism, unspecified type  -     levothyroxine (Euthyrox) 50 mcg tablet; Take 1 tablet (50 mcg total) by mouth daily in the early morning  -     TSH, 3rd generation; Future; Expected date: 12/29/2023  -     TSH, 3rd generation    Reviewed all labs  Pt assured the lyme titre is old - she does not recall having lyme  I will correct her vit d  Pt will get anemia labs  Thyroid meds adjusted        There are no Patient Instructions on file for this visit. No follow-ups on file. Subjective:      Patient ID: Gabbi Kohler is a 46 y.o. female. Chief Complaint   Patient presents with   • Follow-up   • Results     Sas/cma       Pt is here to discuss meds. Pt states she was called by neurology - with some low vit d  Pt states she was also told she had old lyme disease. - pt states as far as she knows she has never had lyme and is concerned this is causing her chronic pain and maybe her porfirio cardia    Pt states she is unable to go to pain mgmt - states he out of pocket costs are to high for her.   Pt does not want to be on pain meds but asking wjhat she can take for antiinflammatory            The following portions of the patient's history were reviewed and updated as appropriate: allergies, current medications, past family history, past medical history, past social history, past surgical history and problem list.    Review of Systems   Musculoskeletal:  Positive for arthralgias, myalgias, neck pain and neck stiffness. Current Outpatient Medications   Medication Sig Dispense Refill   • albuterol (Proventil HFA) 90 mcg/act inhaler Inhale 2 puffs 4 (four) times a day as needed for wheezing 1 g 0   • busPIRone (BUSPAR) 5 mg tablet Take 1 tablet (5 mg total) by mouth 2 (two) times a day 60 tablet 5   • cholecalciferol (VITAMIN D3) 25 mcg (1,000 units) tablet      • ergocalciferol (ERGOCALCIFEROL) 1.25 MG (35070 UT) capsule Take 1 capsule (50,000 Units total) by mouth once a week 8 capsule 0   • levothyroxine (Euthyrox) 50 mcg tablet Take 1 tablet (50 mcg total) by mouth daily in the early morning 90 tablet 3   • levothyroxine 200 mcg tablet Take 1 tablet (200 mcg total) by mouth daily 90 tablet 1   • liothyronine (CYTOMEL) 5 mcg tablet Take 1 tablet (5 mcg total) by mouth daily 90 tablet 1   • lisinopril (ZESTRIL) 5 mg tablet Take 1 tablet (5 mg total) by mouth daily 90 tablet 1   • LORazepam (ATIVAN) 1 mg tablet Take 1 tablet (1 mg total) by mouth daily as needed for anxiety 30 tablet 0   • meloxicam (MOBIC) 15 mg tablet Take 1 tablet (15 mg total) by mouth daily As needed 90 tablet 0   • montelukast (SINGULAIR) 10 mg tablet Take 1 tablet (10 mg total) by mouth daily 90 tablet 1   • omeprazole (PriLOSEC) 40 MG capsule Take 1 capsule (40 mg total) by mouth daily 30 capsule 2   • traZODone (DESYREL) 50 mg tablet Take 1 tablet (50 mg total) by mouth daily 90 tablet 1   • vitamin B-12 (VITAMIN B-12) 1,000 mcg tablet Take by mouth daily       No current facility-administered medications for this visit. Objective:    /74   Pulse 76   Temp 98.7 °F (37.1 °C)   Resp 16   Wt 76.7 kg (169 lb)   BMI 29.94 kg/m²        Physical Exam  Vitals and nursing note reviewed. Constitutional:       General: She is not in acute distress. Appearance: She is well-developed. She is not diaphoretic. HENT:      Head: Normocephalic and atraumatic.       Right Ear: External ear normal.      Left Ear: External ear normal.      Nose: Nose normal.      Mouth/Throat:      Pharynx: No oropharyngeal exudate. Eyes:      General: No scleral icterus. Right eye: No discharge. Left eye: No discharge. Pupils: Pupils are equal, round, and reactive to light. Neck:      Thyroid: No thyromegaly. Cardiovascular:      Rate and Rhythm: Normal rate. Heart sounds: Normal heart sounds. No murmur heard. Pulmonary:      Effort: Pulmonary effort is normal. No respiratory distress. Breath sounds: Normal breath sounds. No wheezing. Abdominal:      General: Bowel sounds are normal. There is no distension. Palpations: Abdomen is soft. There is no mass. Tenderness: There is no abdominal tenderness. There is no guarding or rebound. Musculoskeletal:         General: Normal range of motion. Skin:     General: Skin is warm and dry. Findings: No erythema or rash. Neurological:      Mental Status: She is alert.       Coordination: Coordination normal.      Deep Tendon Reflexes: Reflexes normal.   Psychiatric:         Behavior: Behavior normal.                Blane Krause,

## 2023-11-15 NOTE — TELEPHONE ENCOUNTER
Spoke to the patient and informed her of her BW results. The patient states that she was never informed that she was positive for lyme and was never treated for lyme. The patient also requested that her lyme results be sent to the [address] Report faxed [phone]  Phone # [phone]    ----- Message from Aurea Bob MD sent at 11/14/2023  3:31 PM EST -----  Please call the patient regarding her abnormal result. Her vit D level is borderline low. She can take over the counter D3 5000 units 2-3x/week. Her lyme antibody is IgG, old exposure so not concerning.

## 2023-11-27 ENCOUNTER — OFFICE VISIT (OUTPATIENT)
Dept: NEUROLOGY | Facility: CLINIC | Age: 48
End: 2023-11-27
Payer: COMMERCIAL

## 2023-11-27 VITALS
OXYGEN SATURATION: 100 % | DIASTOLIC BLOOD PRESSURE: 70 MMHG | BODY MASS INDEX: 31.01 KG/M2 | SYSTOLIC BLOOD PRESSURE: 106 MMHG | WEIGHT: 175 LBS | HEART RATE: 51 BPM | HEIGHT: 63 IN | TEMPERATURE: 96.8 F

## 2023-11-27 DIAGNOSIS — M50.30 DDD (DEGENERATIVE DISC DISEASE), CERVICAL: ICD-10-CM

## 2023-11-27 DIAGNOSIS — M54.16 RADICULOPATHY, LUMBAR REGION: ICD-10-CM

## 2023-11-27 DIAGNOSIS — R90.82 WHITE MATTER DISEASE: ICD-10-CM

## 2023-11-27 DIAGNOSIS — H53.9 VISION DISTURBANCE: Primary | ICD-10-CM

## 2023-11-27 PROCEDURE — 99214 OFFICE O/P EST MOD 30 MIN: CPT | Performed by: PSYCHIATRY & NEUROLOGY

## 2023-11-27 RX ORDER — FLUCONAZOLE 150 MG/1
TABLET ORAL AS NEEDED
COMMUNITY
Start: 2023-09-10

## 2023-11-27 NOTE — LETTER
November 27, 2023     [place]  [id number] [address] [address]    Patient: Jeraldine Kocher   YOB: 1975   Date of Visit: 11/27/2023       Dear Dr. Antoine Catalan:    Thank you for referring Radha Velazco to me for evaluation. Below are my notes for this consultation. If you have questions, please do not hesitate to call me. I look forward to following your patient along with you. Sincerely,        Ludie Najjar, MD        CC: No Recipients    Ludie Najjar, MD  11/27/2023  3:45 PM  Incomplete  Return NeuroOutpatient Note        Jeraldine Kocher  [id number]  46 y.o.  1975       Demyelinating disease, Diplopia, Alteration in vision, Chronic lower back pain, and Weakness of both legs        History obtained from:  Patient and son     HPI/Subjective:  Jeraldine Kocher is a 45 yo F with PMH of vision disturbance presents as f/u. She had reported onset of double vision sometime in Dec/Jan. She says it's more of blurred vision. She does report working on computer all day long and at the end of day, they get very tired. She had reported associated pain. Her PCP had ordered MRI brain MS protocol which revealed mild white matter change within the frontal lobes is nonspecific for a patient of this age and differential considerations would include precocious chronic microangiopathic change, infectious/inflammatory etiology such as collagen vascular disorders or chronic demyelinating disease although pattern was not diagnostic for demyelinating condition. We had reported MRI brain MS protocol and orbits and it showed same. Her white matter lesions could be from small vessel disease, migraines and demyelinating disease not excluded. She was seen by ophthalmologist and was told she needs glasses but she hasn't gotten around to getting them. All of her labs including sed rate, vit b12, spep, lyme, crp, rpr were checked and were wnl.  Her vit D was borderline low and is replaced. She had cervical spine MRI that had revealed C5-6 left > right canal stenosis and foraminal narrowing. Her MRI lumbar spine from aug 2023 showed left neural foraminal disc protrusion at L4-5 with severe left neural foraminal narrowing; right sided facet cyst at L5-S1 causing moderate narrowing of right subarticular recess/right neural foramen. She doesn't have any extremity weakness or paresthesia. She has h/o post portum cardiomyopathy. She has been seen by ophthalmology who reported astigmatism and wanted her to wear bifocals but she hasn't gotten around to ordering them. She has poor tinnitus. She's had problem with her ears since childhood age. She can't afford to have hearing aides or ENT evaluation. Her job is in [place]. She works remote. She's covered if she goes to facilities in [place] but can't drive there for medical care. Past Medical History:   Diagnosis Date   • Allergic    • Anxiety    • Asthma    • Cardiomyopathy ([address])     bradycardia normal 50-55 bpm   • Depression    • Disease of thyroid gland    • Interstitial cystitis      Social History     Socioeconomic History   • Marital status: Legally      Spouse name: Not on file   • Number of children: Not on file   • Years of education: Not on file   • Highest education level: Not on file   Occupational History   • Not on file   Tobacco Use   • Smoking status: Never     Passive exposure: Never   • Smokeless tobacco: Never   Vaping Use   • Vaping Use: Never used   Substance and Sexual Activity   • Alcohol use:  Yes     Alcohol/week: 1.0 standard drink of alcohol     Types: 1 Cans of beer per week     Comment: social   • Drug use: Yes     Types: Marijuana   • Sexual activity: Not on file   Other Topics Concern   • Not on file   Social History Narrative   • Not on file     Social Determinants of Health     Financial Resource Strain: Not on file   Food Insecurity: No Food Insecurity (6/27/2023)    Hunger Vital Sign • Worried About [place] in the Last Year: Never true    • Ran Out of Food in the Last Year: Never true   Transportation Needs: No Transportation Needs (6/27/2023)    PRAPARE - Transportation    • Lack of Transportation (Medical): No    • Lack of Transportation (Non-Medical):  No   Physical Activity: Not on file   Stress: Not on file   Social Connections: Not on file   Intimate Partner Violence: Not on file   Housing Stability: Low Risk  (6/27/2023)    Housing Stability Vital Sign    • Unable to Pay for Housing in the Last Year: No    • Number of Places Lived in the Last Year: 1    • Unstable Housing in the Last Year: No     Family History   Problem Relation Age of Onset   • Cancer Mother    • Cervical cancer Mother    • Arthritis Mother    • Asthma Father    • Arthritis Father    • Breast cancer Cousin         age unknown     No Known Allergies  Current Outpatient Medications on File Prior to Visit   Medication Sig Dispense Refill   • albuterol (Proventil HFA) 90 mcg/act inhaler Inhale 2 puffs 4 (four) times a day as needed for wheezing 1 g 0   • busPIRone (BUSPAR) 5 mg tablet Take 1 tablet (5 mg total) by mouth 2 (two) times a day 60 tablet 5   • cholecalciferol (VITAMIN D3) 25 mcg (1,000 units) tablet      • ergocalciferol (ERGOCALCIFEROL) 1.25 MG (60701 UT) capsule Take 1 capsule (50,000 Units total) by mouth once a week 8 capsule 0   • fluconazole (DIFLUCAN) 150 mg tablet if needed     • levothyroxine (Euthyrox) 50 mcg tablet Take 1 tablet (50 mcg total) by mouth daily in the early morning 90 tablet 3   • levothyroxine 200 mcg tablet Take 1 tablet (200 mcg total) by mouth daily 90 tablet 1   • liothyronine (CYTOMEL) 5 mcg tablet Take 1 tablet (5 mcg total) by mouth daily 90 tablet 1   • lisinopril (ZESTRIL) 5 mg tablet Take 1 tablet (5 mg total) by mouth daily 90 tablet 1   • LORazepam (ATIVAN) 1 mg tablet Take 1 tablet (1 mg total) by mouth daily as needed for anxiety 30 tablet 0   • meloxicam (MOBIC) 15 mg tablet Take 1 tablet (15 mg total) by mouth daily As needed 90 tablet 0   • montelukast (SINGULAIR) 10 mg tablet Take 1 tablet (10 mg total) by mouth daily 90 tablet 1   • omeprazole (PriLOSEC) 40 MG capsule Take 1 capsule (40 mg total) by mouth daily 30 capsule 2   • traZODone (DESYREL) 50 mg tablet Take 1 tablet (50 mg total) by mouth daily 90 tablet 1   • vitamin B-12 (VITAMIN B-12) 1,000 mcg tablet Take by mouth daily       No current facility-administered medications on file prior to visit. Review of Systems   Refer to positive review of systems in HPI.    Review of Systems    Constitutional- No fever  Eyes- No visual change  ENT- Hearing normal  CV- No chest pain  Resp- No Shortness of breath  GI- No diarrhea  - Bladder normal  MS- No Arthritis   Skin- No rash  Psych- No depression  Endo- No DM  Heme- No nodes    Vitals:    11/27/23 1500   BP: 106/70   BP Location: Left arm   Patient Position: Sitting   Cuff Size: Standard   Pulse: (!) 51   Temp: (!) 96.8 °F (36 °C)   TempSrc: Tympanic   SpO2: 100%   Weight: 79.4 kg (175 lb)   Height: 5' 3" (1.6 m)       PHYSICAL EXAM:  Appearance: No Acute Distress  Ophthalmoscopic: Disc Flat, Normal fundus  Mental status:  Orientation: Awake, Alert, and Orientedx3  Memory: Registation 3/3 Recall 3/3  Attention: normal  Knowledge: good  Language: No aphasia  Speech: No dysarthria  Cranial Nerves:  2 No Visual Defect on Confrontation, Pupils round, equal, reactive to light  3,4,6 Extraocular Movements Intact, no nystagmus  5 Facial Sensation Intact  7 No facial asymmetry  8 Intact hearing  9,10 Palate symmetric, normal gag  11 Good shoulder shrug  12 Tongue Midline  Gait: Stable  Coordination: No ataxia with finger to nose testing, and heel to shin  Sensory: Intact, Symmetric to pinprick, light touch, vibration, and joint position  Muscle Tone: Normal              Muscle exam:  Arm Right Left Leg Right Left   Deltoid 5/5 5/5 Iliopsoas 5/5 5/5   Biceps 5/5 5/5 Quads 5/5 5/5   Triceps 5/5 5/5 Hamstrings 5/5 5/5   Wrist Extension 5/5 5/5 Ankle Dorsi Flexion 5/5 5/5   Wrist Flexion 5/5 5/5 Ankle Plantar Flexion 5/5 5/5   Interossei 5/5 5/5 Ankle Eversion 5/5 5/5   APB 5/5 5/5 Ankle Inversion 5/5 5/5       Reflexes   RJ BJ TJ KJ AJ Plantars Ornelas's   Right 2+ 2+ 2+ 2+ 2+ Downgoing Not present   Left 2+ 2+ 2+ 2+ 2+ Downgoing Not present     Personal review of  Labs:                 Diagnoses and all orders for this visit:      1. Vision disturbance        2. Radiculopathy, lumbar region        3. DDD (degenerative disc disease), cervical        4. White matter disease            Patient needs glasses but hasn't been able to get them. Her mri brain and exam do not suggest possibility of MS at this point. We can f/u in 1-2 years. She should see pain management for lumbar and cervical DDD when she's able to.                Total time of encounter:  30 min  More than 50% of the time was used in counseling and/or coordination of care  Extent of counseling and/or coordination of care        Yoan Severino MD  [facility]  [address]  [phone]

## 2023-11-27 NOTE — PROGRESS NOTES
Return NeuroOutpatient Note        Mann Valdes  [id number]  46 y.o.  1975       Demyelinating disease, Diplopia, Alteration in vision, Chronic lower back pain, and Weakness of both legs        History obtained from:  Patient and son     HPI/Subjective:  Mann Valdes is a 47 yo F with PMH of vision disturbance presents as f/u. She had reported onset of double vision sometime in Dec/Jan. She says it's more of blurred vision. She does report working on computer all day long and at the end of day, they get very tired. She had reported associated pain. Her PCP had ordered MRI brain MS protocol which revealed mild white matter change within the frontal lobes is nonspecific for a patient of this age and differential considerations would include precocious chronic microangiopathic change, infectious/inflammatory etiology such as collagen vascular disorders or chronic demyelinating disease although pattern was not diagnostic for demyelinating condition. We had reported MRI brain MS protocol and orbits and it showed same. Her white matter lesions could be from small vessel disease, migraines and demyelinating disease not excluded. She was seen by ophthalmologist and was told she needs glasses but she hasn't gotten around to getting them. All of her labs including sed rate, vit b12, spep, lyme, crp, rpr were checked and were wnl. Her vit D was borderline low and is replaced. She had cervical spine MRI that had revealed C5-6 left > right canal stenosis and foraminal narrowing. Her MRI lumbar spine from aug 2023 showed left neural foraminal disc protrusion at L4-5 with severe left neural foraminal narrowing; right sided facet cyst at L5-S1 causing moderate narrowing of right subarticular recess/right neural foramen. She doesn't have any extremity weakness or paresthesia. She has h/o post portum cardiomyopathy.    She has been seen by ophthalmology who reported astigmatism and wanted her to wear bifocals but she hasn't gotten around to ordering them. She has poor tinnitus. She's had problem with her ears since childhood age. She can't afford to have hearing aides or ENT evaluation. Her job is in [place]. She works remote. She's covered if she goes to facilities in [place] but can't drive there for medical care. Past Medical History:   Diagnosis Date   • Allergic    • Anxiety    • Asthma    • Cardiomyopathy ([address])     bradycardia normal 50-55 bpm   • Depression    • Disease of thyroid gland    • Interstitial cystitis      Social History     Socioeconomic History   • Marital status: Legally      Spouse name: Not on file   • Number of children: Not on file   • Years of education: Not on file   • Highest education level: Not on file   Occupational History   • Not on file   Tobacco Use   • Smoking status: Never     Passive exposure: Never   • Smokeless tobacco: Never   Vaping Use   • Vaping Use: Never used   Substance and Sexual Activity   • Alcohol use: Yes     Alcohol/week: 1.0 standard drink of alcohol     Types: 1 Cans of beer per week     Comment: social   • Drug use: Yes     Types: Marijuana   • Sexual activity: Not on file   Other Topics Concern   • Not on file   Social History Narrative   • Not on file     Social Determinants of Health     Financial Resource Strain: Not on file   Food Insecurity: No Food Insecurity (6/27/2023)    Hunger Vital Sign    • Worried About Running Out of Food in the Last Year: Never true    • Ran Out of Food in the Last Year: Never true   Transportation Needs: No Transportation Needs (6/27/2023)    PRAPARE - Transportation    • Lack of Transportation (Medical): No    • Lack of Transportation (Non-Medical):  No   Physical Activity: Not on file   Stress: Not on file   Social Connections: Not on file   Intimate Partner Violence: Not on file   Housing Stability: Low Risk  (6/27/2023)    Housing Stability Vital Sign    • Unable to Pay for Housing in the Last Year: No    • Number of Places Lived in the Last Year: 1    • Unstable Housing in the Last Year: No     Family History   Problem Relation Age of Onset   • Cancer Mother    • Cervical cancer Mother    • Arthritis Mother    • Asthma Father    • Arthritis Father    • Breast cancer Cousin         age unknown     No Known Allergies  Current Outpatient Medications on File Prior to Visit   Medication Sig Dispense Refill   • albuterol (Proventil HFA) 90 mcg/act inhaler Inhale 2 puffs 4 (four) times a day as needed for wheezing 1 g 0   • busPIRone (BUSPAR) 5 mg tablet Take 1 tablet (5 mg total) by mouth 2 (two) times a day 60 tablet 5   • cholecalciferol (VITAMIN D3) 25 mcg (1,000 units) tablet      • ergocalciferol (ERGOCALCIFEROL) 1.25 MG (14943 UT) capsule Take 1 capsule (50,000 Units total) by mouth once a week 8 capsule 0   • fluconazole (DIFLUCAN) 150 mg tablet if needed     • levothyroxine (Euthyrox) 50 mcg tablet Take 1 tablet (50 mcg total) by mouth daily in the early morning 90 tablet 3   • levothyroxine 200 mcg tablet Take 1 tablet (200 mcg total) by mouth daily 90 tablet 1   • liothyronine (CYTOMEL) 5 mcg tablet Take 1 tablet (5 mcg total) by mouth daily 90 tablet 1   • lisinopril (ZESTRIL) 5 mg tablet Take 1 tablet (5 mg total) by mouth daily 90 tablet 1   • LORazepam (ATIVAN) 1 mg tablet Take 1 tablet (1 mg total) by mouth daily as needed for anxiety 30 tablet 0   • meloxicam (MOBIC) 15 mg tablet Take 1 tablet (15 mg total) by mouth daily As needed 90 tablet 0   • montelukast (SINGULAIR) 10 mg tablet Take 1 tablet (10 mg total) by mouth daily 90 tablet 1   • omeprazole (PriLOSEC) 40 MG capsule Take 1 capsule (40 mg total) by mouth daily 30 capsule 2   • traZODone (DESYREL) 50 mg tablet Take 1 tablet (50 mg total) by mouth daily 90 tablet 1   • vitamin B-12 (VITAMIN B-12) 1,000 mcg tablet Take by mouth daily       No current facility-administered medications on file prior to visit.          Review of Systems   Refer to positive review of systems in HPI. Review of Systems    Constitutional- No fever  Eyes- No visual change  ENT- Hearing normal  CV- No chest pain  Resp- No Shortness of breath  GI- No diarrhea  - Bladder normal  MS- No Arthritis   Skin- No rash  Psych- No depression  Endo- No DM  Heme- No nodes    Vitals:    11/27/23 1500   BP: 106/70   BP Location: Left arm   Patient Position: Sitting   Cuff Size: Standard   Pulse: (!) 51   Temp: (!) 96.8 °F (36 °C)   TempSrc: Tympanic   SpO2: 100%   Weight: 79.4 kg (175 lb)   Height: 5' 3" (1.6 m)       PHYSICAL EXAM:  Appearance: No Acute Distress  Ophthalmoscopic: Disc Flat, Normal fundus  Mental status:  Orientation: Awake, Alert, and Orientedx3  Memory: Registation 3/3 Recall 3/3  Attention: normal  Knowledge: good  Language: No aphasia  Speech: No dysarthria  Cranial Nerves:  2 No Visual Defect on Confrontation, Pupils round, equal, reactive to light  3,4,6 Extraocular Movements Intact, no nystagmus  5 Facial Sensation Intact  7 No facial asymmetry  8 Intact hearing  9,10 Palate symmetric, normal gag  11 Good shoulder shrug  12 Tongue Midline  Gait: Stable  Coordination: No ataxia with finger to nose testing, and heel to shin  Sensory: Intact, Symmetric to pinprick, light touch, vibration, and joint position  Muscle Tone: Normal              Muscle exam:  Arm Right Left Leg Right Left   Deltoid 5/5 5/5 Iliopsoas 5/5 5/5   Biceps 5/5 5/5 Quads 5/5 5/5   Triceps 5/5 5/5 Hamstrings 5/5 5/5   Wrist Extension 5/5 5/5 Ankle Dorsi Flexion 5/5 5/5   Wrist Flexion 5/5 5/5 Ankle Plantar Flexion 5/5 5/5   Interossei 5/5 5/5 Ankle Eversion 5/5 5/5   APB 5/5 5/5 Ankle Inversion 5/5 5/5       Reflexes   RJ BJ TJ KJ AJ Plantars Ornelas's   Right 2+ 2+ 2+ 2+ 2+ Downgoing Not present   Left 2+ 2+ 2+ 2+ 2+ Downgoing Not present     Personal review of  Labs:                 Diagnoses and all orders for this visit:      1. Vision disturbance        2. Radiculopathy, lumbar region        3. DDD (degenerative disc disease), cervical        4. White matter disease            Patient needs glasses but hasn't been able to get them. Her mri brain and exam do not suggest possibility of MS at this point. We can f/u in 1-2 years. She should see pain management for lumbar and cervical DDD when she's able to.                Total time of encounter:  30 min  More than 50% of the time was used in counseling and/or coordination of care  Extent of counseling and/or coordination of care        Prema Henning MD  [facility]  [address] [place] [address]  [phone]

## 2023-11-28 ENCOUNTER — TELEPHONE (OUTPATIENT)
Dept: NEUROLOGY | Facility: CLINIC | Age: 48
End: 2023-11-28

## 2023-11-28 NOTE — TELEPHONE ENCOUNTER
Contacted the patient to inform her of the provider information. The patient states that she does not have voice tremors but have other issues that she is seeing specialist for. I informed the patient that this call was made to the incorrect person and to disregard.    ----- Message from Erika Escamilla MD sent at 11/28/2023 10:58 AM EST -----  Meryle Semen,  Can you call this patient's son to notify that our movement specialist does not do botox for her voice tremor. It'll have to be ENT. The have an appt in feb with ENT, hopefully they can help.     Thanks

## 2023-12-14 ENCOUNTER — OFFICE VISIT (OUTPATIENT)
Dept: FAMILY MEDICINE CLINIC | Facility: CLINIC | Age: 48
End: 2023-12-14
Payer: COMMERCIAL

## 2023-12-14 VITALS
HEIGHT: 63 IN | WEIGHT: 162.8 LBS | SYSTOLIC BLOOD PRESSURE: 108 MMHG | HEART RATE: 64 BPM | RESPIRATION RATE: 14 BRPM | TEMPERATURE: 96.5 F | BODY MASS INDEX: 28.84 KG/M2 | DIASTOLIC BLOOD PRESSURE: 60 MMHG

## 2023-12-14 DIAGNOSIS — F41.9 ANXIETY: ICD-10-CM

## 2023-12-14 DIAGNOSIS — J20.9 ACUTE BRONCHITIS, UNSPECIFIED ORGANISM: Primary | ICD-10-CM

## 2023-12-14 DIAGNOSIS — I10 ESSENTIAL HYPERTENSION: ICD-10-CM

## 2023-12-14 DIAGNOSIS — H69.91 DYSFUNCTION OF EUSTACHIAN TUBE, RIGHT: ICD-10-CM

## 2023-12-14 PROCEDURE — 99214 OFFICE O/P EST MOD 30 MIN: CPT | Performed by: NURSE PRACTITIONER

## 2023-12-14 RX ORDER — CEFUROXIME AXETIL 500 MG/1
500 TABLET ORAL EVERY 12 HOURS SCHEDULED
Qty: 20 TABLET | Refills: 0 | Status: SHIPPED | OUTPATIENT
Start: 2023-12-14 | End: 2023-12-24

## 2023-12-14 RX ORDER — PREDNISONE 10 MG/1
TABLET ORAL
Qty: 20 TABLET | Refills: 0 | Status: SHIPPED | OUTPATIENT
Start: 2023-12-14 | End: 2023-12-24

## 2023-12-14 NOTE — PROGRESS NOTES
Assessment/Plan:    1. Acute bronchitis, unspecified organism  -     cefuroxime (CEFTIN) 500 mg tablet; Take 1 tablet (500 mg total) by mouth every 12 (twelve) hours for 10 days  -     predniSONE 10 mg tablet; 4 tabs x 2 days, 3 tabs x 2 days, 2 tabs x 2 days, 1 tab x 2 days    2. Dysfunction of Eustachian tube, right  -     cefuroxime (CEFTIN) 500 mg tablet; Take 1 tablet (500 mg total) by mouth every 12 (twelve) hours for 10 days  -     predniSONE 10 mg tablet; 4 tabs x 2 days, 3 tabs x 2 days, 2 tabs x 2 days, 1 tab x 2 days    3. Essential hypertension  Assessment & Plan:  Stable with current regimen      4. Anxiety  Assessment & Plan:  Stable with current regimen          Patient Instructions: Take medication with food. It is important that you take the entire course of antibiotics prescribed. May also take a probiotic of your choice to maintain healthy GI lianne. Can take some probiotic and yogurt with the medication. Take prednisone with food in morning and do not take any NSAID's while taking prednisone. Return if symptoms worsen or fail to improve. Future Appointments   Date Time Provider [address] [place] [address]   1/24/2024  5:00 PM Sergey Francis. [place] [facility] [address]   2/7/2024  8:00 AM Leo Kim DO [facility]           Subjective:      Patient ID: Анна Manuel is a 46 y.o. female. Chief Complaint   Patient presents with   • Cough     X 1 week   • Earache     X 1 day - right ear  Obed Chavarria CMA    • Cold Like Symptoms   • Tinnitus     Very loud today in ears         Vitals:  /60   Pulse 64   Temp (!) 96.5 °F (35.8 °C)   Resp 14   Ht 5' 3" (1.6 m)   Wt 73.8 kg (162 lb 12.8 oz)   BMI 28.84 kg/m²     Patient stated that started with URI symptoms about a week ago and progressed to right ear pain and wheezing. Denies fever, chills and sob. Had to use inhaler couple of times. Cough  Associated symptoms include ear pain and wheezing.  Pertinent negatives include no chills, fever, headaches, postnasal drip, rhinorrhea, sore throat or shortness of breath. Earache   Associated symptoms include coughing. Pertinent negatives include no abdominal pain, diarrhea, ear discharge, headaches, hearing loss, rhinorrhea, sore throat or vomiting. The following portions of the patient's history were reviewed and updated as appropriate: allergies, current medications, past family history, past medical history, past social history, past surgical history and problem list.      Review of Systems   Constitutional:  Negative for chills, diaphoresis, fatigue, fever and unexpected weight change. HENT:  Positive for ear pain. Negative for congestion, dental problem, drooling, ear discharge, facial swelling, hearing loss, mouth sores, nosebleeds, postnasal drip, rhinorrhea, sinus pressure, sinus pain, sneezing, sore throat, tinnitus, trouble swallowing and voice change. Respiratory:  Positive for cough and wheezing. Negative for chest tightness and shortness of breath. Cardiovascular: Negative. Gastrointestinal:  Negative for abdominal pain, constipation, diarrhea, nausea and vomiting. Musculoskeletal: Negative. Skin: Negative. Neurological:  Negative for dizziness, light-headedness and headaches.          Objective:    Social History     Tobacco Use   Smoking Status Never   • Passive exposure: Never   Smokeless Tobacco Never       Allergies: No Known Allergies      Current Outpatient Medications   Medication Sig Dispense Refill   • albuterol (Proventil HFA) 90 mcg/act inhaler Inhale 2 puffs 4 (four) times a day as needed for wheezing 1 g 0   • busPIRone (BUSPAR) 5 mg tablet Take 1 tablet (5 mg total) by mouth 2 (two) times a day 60 tablet 5   • cefuroxime (CEFTIN) 500 mg tablet Take 1 tablet (500 mg total) by mouth every 12 (twelve) hours for 10 days 20 tablet 0   • cholecalciferol (VITAMIN D3) 25 mcg (1,000 units) tablet      • ergocalciferol (ERGOCALCIFEROL) 1.25 MG (03240 UT) capsule Take 1 capsule (50,000 Units total) by mouth once a week 8 capsule 0   • fluconazole (DIFLUCAN) 150 mg tablet if needed     • levothyroxine (Euthyrox) 50 mcg tablet Take 1 tablet (50 mcg total) by mouth daily in the early morning 90 tablet 3   • levothyroxine 200 mcg tablet Take 1 tablet (200 mcg total) by mouth daily 90 tablet 1   • liothyronine (CYTOMEL) 5 mcg tablet Take 1 tablet (5 mcg total) by mouth daily 90 tablet 1   • lisinopril (ZESTRIL) 5 mg tablet Take 1 tablet (5 mg total) by mouth daily 90 tablet 1   • LORazepam (ATIVAN) 1 mg tablet Take 1 tablet (1 mg total) by mouth daily as needed for anxiety 30 tablet 0   • montelukast (SINGULAIR) 10 mg tablet Take 1 tablet (10 mg total) by mouth daily 90 tablet 1   • omeprazole (PriLOSEC) 40 MG capsule Take 1 capsule (40 mg total) by mouth daily 30 capsule 2   • predniSONE 10 mg tablet 4 tabs x 2 days, 3 tabs x 2 days, 2 tabs x 2 days, 1 tab x 2 days 20 tablet 0   • traZODone (DESYREL) 50 mg tablet Take 1 tablet (50 mg total) by mouth daily 90 tablet 1   • vitamin B-12 (VITAMIN B-12) 1,000 mcg tablet Take by mouth daily     • meloxicam (MOBIC) 15 mg tablet Take 1 tablet (15 mg total) by mouth daily As needed (Patient not taking: Reported on 12/14/2023) 90 tablet 0     No current facility-administered medications for this visit. Physical Exam  Vitals reviewed. Constitutional:       Appearance: Normal appearance. She is well-developed. HENT:      Head: Normocephalic. Right Ear: Ear canal and external ear normal. A middle ear effusion is present. Left Ear: Tympanic membrane, ear canal and external ear normal.      Nose: Nose normal.      Right Sinus: No maxillary sinus tenderness or frontal sinus tenderness. Left Sinus: No maxillary sinus tenderness or frontal sinus tenderness. Mouth/Throat:      Mouth: No oral lesions.       Pharynx: No oropharyngeal exudate or posterior oropharyngeal erythema. Cardiovascular:      Rate and Rhythm: Normal rate and regular rhythm. Heart sounds: Normal heart sounds. Pulmonary:      Effort: Pulmonary effort is normal.      Breath sounds: Wheezing present. Musculoskeletal:         General: Normal range of motion. Cervical back: Neck supple. Lymphadenopathy:      Cervical:      Right cervical: No superficial or posterior cervical adenopathy. Left cervical: No superficial or posterior cervical adenopathy. Skin:     General: Skin is warm and dry. Neurological:      Mental Status: She is alert and oriented to person, place, and time. Psychiatric:         Behavior: Behavior normal.         Thought Content:  Thought content normal.         Judgment: Judgment normal.                     JERONIMO Mustafa

## 2023-12-14 NOTE — PATIENT INSTRUCTIONS
Prednisone (By mouth)   Prednisone (PRED-ni-sone)  Treats many diseases and conditions, especially problems related to inflammation. This medicine is a corticosteroid. Brand Name(s): Bennett, predniSONE Intensol   There may be other brand names for this medicine. When This Medicine Should Not Be Used: This medicine is not right for everyone. Do not use if you had an allergic reaction to prednisone or if you are pregnant. How to Use This Medicine:   Liquid, Tablet, Delayed Release Tablet  Take your medicine as directed. Your dose may need to be changed several times to find what works best for you. It is best to take this medicine with food or milk. Swallow the delayed-release tablet whole. Do not crush, break, or chew it. Measure the oral liquid medicine with a marked measuring spoon, oral syringe, or medicine cup. Missed dose: Take a dose as soon as you remember. If it is almost time for your next dose, wait until then and take a regular dose. Do not take extra medicine to make up for a missed dose. Store the medicine in a closed container at room temperature, away from heat, moisture, and direct light. Do not freeze the oral liquid. Drugs and Foods to Avoid:   Ask your doctor or pharmacist before using any other medicine, including over-the-counter medicines, vitamins, and herbal products. Tell your doctor if you use any of the following:  Aminoglutethimide, amphotericin B, carbamazepine, cholestyramine, cyclosporine, digoxin, isoniazid, ketoconazole, phenobarbital, phenytoin, or rifampin  Blood thinner, such as warfarin  NSAID pain or arthritis medicine, such as aspirin, diclofenac, ibuprofen, naproxen, celecoxib  Diuretic (water pill)  Diabetes medicine  Macrolide antibiotic, such as azithromycin, clarithromycin, erythromycin  Estrogen, including birth control pills or hormone replacement therapy  This medicine may interfere with vaccines.  Ask your doctor before you get a flu shot or any other vaccines. Warnings While Using This Medicine: It is not safe to take this medicine during pregnancy. It could harm an unborn baby. Tell your doctor right away if you become pregnant. Tell your doctor if you are breastfeeding or if you have kidney problems, heart failure, high blood pressure, a recent heart attack, diabetes, glaucoma, osteoporosis, or thyroid problems. Tell your doctor about any infection you have. Also tell your doctor if you have had mental or emotional problems (such as depression) or stomach or bowel problems (such as an ulcer or diverticulitis). This medicine may cause the following problems:  Mood or behavior changes  Higher blood pressure, retaining water, changes in salt or potassium levels in your body  Cataracts or glaucoma (with long-term use)  Weak bones or osteoporosis (with long-term use)  Slow growth in children (with long-term use)  Muscle problems (with high doses, especially if you have myasthenia gravis or similar nerve and muscle problems)  Do not stop using this medicine suddenly. Your doctor will need to slowly decrease your dose before you stop it completely. This medicine could cause you to get infections more easily. Tell your doctor right away if you are exposed to chicken pox, measles, or other serious infection. Tell your doctor if you had a serious infection in the past, such as tuberculosis or herpes. Tell your doctor about any extra stress or anxiety in your life. Your dose might need to be changed for a short time. Tell any doctor or dentist who treats you that you are using this medicine. This medicine may affect certain medical test results. Keep all medicine out of the reach of children. Never share your medicine with anyone. Possible Side Effects While Using This Medicine:   Call your doctor right away if you notice any of these side effects:   Allergic reaction: Itching or hives, swelling in your face or hands, swelling or tingling in your mouth or throat, chest tightness, trouble breathing  Dark freckles, skin color changes, coldness, weakness, tiredness, nausea, vomiting, weight loss  Depression, unusual thoughts, feelings, or behaviors, trouble sleeping  Fever, chills, cough, sore throat, and body aches  Muscle pain or weakness  Rapid weight gain, swelling in your hands, ankles, or feet  Severe stomach pain, nausea, vomiting, or red or black stools  Skin changes or growths  Trouble seeing, eye pain, headache  If you notice these less serious side effects, talk with your doctor: Increased appetite  Round, puffy face  Weight gain around your neck, upper back, breast, face, or waist  If you notice other side effects that you think are caused by this medicine, tell your doctor. Call your doctor for medical advice about side effects. You may report side effects to FDA at 1-912-FDA-8683  © Copyright Lindsay Spence 2023 Information is for End User's use only and may not be sold, redistributed or otherwise used for commercial purposes. The above information is an  only. It is not intended as medical advice for individual conditions or treatments. Talk to your doctor, nurse or pharmacist before following any medical regimen to see if it is safe and effective for you. Cefuroxime (By mouth)   Cefuroxime Axetil (lak-iu-OPG-eem AX-e-til)  Treats infections. This medicine is a cephalosporin antibiotic. Brand Name(s): Ceftin   There may be other brand names for this medicine. When This Medicine Should Not Be Used: This medicine is not right for everyone. Do not use it if you had an allergic reaction to cefuroxime or similar medicine, including penicillin antibiotics. How to Use This Medicine:   Liquid, Tablet  Your doctor will tell you how much medicine to use. Do not use more than directed. Tablet: Swallow whole. Do not break, crush, or chew it. The medicine tastes bitter when it is broken or crushed.  If you cannot swallow the tablet whole, tell your doctor. Oral liquid: Shake well just before you measure each dose. Measure the medicine with a marked measuring spoon, oral syringe, or medicine cup. It is best to take this medicine with food or milk. If you change from one form of this medicine to another, make sure you understand how much medicine to take. The dose for the tablet is not the same as the dose for the oral liquid form. Take all of the medicine in your prescription to clear up your infection, even if you feel better after the first few doses. Missed dose: Take a dose as soon as you remember. If it is almost time for your next dose, wait until then and take a regular dose. Do not take extra medicine to make up for a missed dose. Tablets:Store the medicine in a closed container at room temperature, away from heat, moisture, and direct light. Oral liquid: Store in the refrigerator. Do not freeze. Throw away any unused medicine after 10 days. Drugs and Foods to Avoid:   Ask your doctor or pharmacist before using any other medicine, including over-the-counter medicines, vitamins, and herbal products. Some food and medicines can affect how cefuroxime works. Tell your doctor if you are using any of the following:  Probenecid  Birth control pills  Acid reducer medicine for the stomach  If you take an antacid, take this medicine at least 1 hour before or 2 hours after the antacid. Warnings While Using This Medicine:   Tell your doctor if you are pregnant or breastfeeding, or if you have kidney disease, liver disease, or allergies. Tell your doctor if you have phenylketonuria (PKU). The oral liquid contains phenylalanine. This medicine can cause diarrhea. Call your doctor if the diarrhea becomes severe, does not stop, or is bloody. Do not take any medicine to stop diarrhea until you have talked to your doctor. Diarrhea can occur 2 months or more after you stop taking this medicine.   Tell any doctor or dentist who treats you that you are using this medicine. This medicine may affect certain medical test results. Call your doctor if your symptoms do not improve or if they get worse. Possible Side Effects While Using This Medicine:   Call your doctor right away if you notice any of these side effects: Allergic reaction: Itching or hives, swelling in your face or hands, swelling or tingling in your mouth or throat, chest tightness, trouble breathing  Blistering, peeling, red skin rash  Severe diarrhea  If you notice these less serious side effects, talk with your doctor:   Mild diarrhea or vomiting  If you notice other side effects that you think are caused by this medicine, tell your doctor. Call your doctor for medical advice about side effects. You may report side effects to FDA at 0-909-FDA-7806  © Copyright Audanikatta Muir 2023 Information is for End User's use only and may not be sold, redistributed or otherwise used for commercial purposes. The above information is an  only. It is not intended as medical advice for individual conditions or treatments. Talk to your doctor, nurse or pharmacist before following any medical regimen to see if it is safe and effective for you.

## 2023-12-27 DIAGNOSIS — F41.9 ANXIETY: ICD-10-CM

## 2023-12-27 DIAGNOSIS — J45.20 MILD INTERMITTENT ASTHMA, UNSPECIFIED WHETHER COMPLICATED: ICD-10-CM

## 2023-12-27 RX ORDER — ALBUTEROL SULFATE 90 UG/1
2 AEROSOL, METERED RESPIRATORY (INHALATION) 4 TIMES DAILY PRN
Qty: 1 G | Refills: 0 | Status: SHIPPED | OUTPATIENT
Start: 2023-12-27

## 2023-12-30 LAB — TSH SERPL DL<=0.005 MIU/L-ACNC: 0.44 UIU/ML (ref 0.45–4.5)

## 2024-01-03 RX ORDER — LORAZEPAM 1 MG/1
1 TABLET ORAL DAILY PRN
Qty: 30 TABLET | Refills: 0 | Status: SHIPPED | OUTPATIENT
Start: 2024-01-03

## 2024-01-03 RX ORDER — BUSPIRONE HYDROCHLORIDE 5 MG/1
5 TABLET ORAL 2 TIMES DAILY
Qty: 60 TABLET | Refills: 0 | Status: SHIPPED | OUTPATIENT
Start: 2024-01-03

## 2024-01-31 ENCOUNTER — APPOINTMENT (EMERGENCY)
Dept: RADIOLOGY | Facility: HOSPITAL | Age: 49
End: 2024-01-31
Payer: COMMERCIAL

## 2024-01-31 ENCOUNTER — HOSPITAL ENCOUNTER (EMERGENCY)
Facility: HOSPITAL | Age: 49
Discharge: HOME | End: 2024-01-31
Attending: EMERGENCY MEDICINE
Payer: COMMERCIAL

## 2024-01-31 VITALS
BODY MASS INDEX: 30.12 KG/M2 | HEIGHT: 63 IN | HEART RATE: 40 BPM | SYSTOLIC BLOOD PRESSURE: 107 MMHG | RESPIRATION RATE: 18 BRPM | WEIGHT: 170 LBS | OXYGEN SATURATION: 97 % | DIASTOLIC BLOOD PRESSURE: 56 MMHG | TEMPERATURE: 98 F

## 2024-01-31 DIAGNOSIS — J32.9 SINUSITIS: ICD-10-CM

## 2024-01-31 DIAGNOSIS — R51.9 HEADACHE: Primary | ICD-10-CM

## 2024-01-31 DIAGNOSIS — R42 DIZZINESS: ICD-10-CM

## 2024-01-31 LAB
2HR DELTA HS TROPONIN: 1 NG/L
ALBUMIN SERPL BCP-MCNC: 3.7 G/DL (ref 3.5–5)
ALP SERPL-CCNC: 66 U/L (ref 34–104)
ALT SERPL W P-5'-P-CCNC: 14 U/L (ref 7–52)
AMPHETAMINES SERPL QL SCN: NEGATIVE
ANION GAP SERPL CALCULATED.3IONS-SCNC: 2 MMOL/L
AST SERPL W P-5'-P-CCNC: 18 U/L (ref 13–39)
BARBITURATES UR QL: NEGATIVE
BASOPHILS # BLD AUTO: 0.03 THOUSANDS/ÂΜL (ref 0–0.1)
BASOPHILS NFR BLD AUTO: 1 % (ref 0–1)
BENZODIAZ UR QL: NEGATIVE
BILIRUB SERPL-MCNC: 0.67 MG/DL (ref 0.2–1)
BUN SERPL-MCNC: 10 MG/DL (ref 5–25)
CALCIUM SERPL-MCNC: 9.1 MG/DL (ref 8.4–10.2)
CARDIAC TROPONIN I PNL SERPL HS: 3 NG/L
CARDIAC TROPONIN I PNL SERPL HS: 4 NG/L
CHLORIDE SERPL-SCNC: 107 MMOL/L (ref 96–108)
CO2 SERPL-SCNC: 29 MMOL/L (ref 21–32)
COCAINE UR QL: NEGATIVE
CREAT SERPL-MCNC: 0.69 MG/DL (ref 0.6–1.3)
EOSINOPHIL # BLD AUTO: 0.06 THOUSAND/ÂΜL (ref 0–0.61)
EOSINOPHIL NFR BLD AUTO: 1 % (ref 0–6)
ERYTHROCYTE [DISTWIDTH] IN BLOOD BY AUTOMATED COUNT: 14.5 % (ref 11.6–15.1)
GFR SERPL CREATININE-BSD FRML MDRD: 101 ML/MIN/1.73SQ M
GLUCOSE SERPL-MCNC: 89 MG/DL (ref 65–140)
GLUCOSE SERPL-MCNC: 94 MG/DL (ref 65–140)
HCT VFR BLD AUTO: 32.1 % (ref 34.8–46.1)
HGB BLD-MCNC: 10.4 G/DL (ref 11.5–15.4)
IMM GRANULOCYTES # BLD AUTO: 0.01 THOUSAND/UL (ref 0–0.2)
IMM GRANULOCYTES NFR BLD AUTO: 0 % (ref 0–2)
LYMPHOCYTES # BLD AUTO: 1.92 THOUSANDS/ÂΜL (ref 0.6–4.47)
LYMPHOCYTES NFR BLD AUTO: 35 % (ref 14–44)
MAGNESIUM SERPL-MCNC: 1.7 MG/DL (ref 1.9–2.7)
MCH RBC QN AUTO: 27.4 PG (ref 26.8–34.3)
MCHC RBC AUTO-ENTMCNC: 32.4 G/DL (ref 31.4–37.4)
MCV RBC AUTO: 85 FL (ref 82–98)
METHADONE UR QL: NEGATIVE
MONOCYTES # BLD AUTO: 0.65 THOUSAND/ÂΜL (ref 0.17–1.22)
MONOCYTES NFR BLD AUTO: 12 % (ref 4–12)
NEUTROPHILS # BLD AUTO: 2.78 THOUSANDS/ÂΜL (ref 1.85–7.62)
NEUTS SEG NFR BLD AUTO: 51 % (ref 43–75)
NRBC BLD AUTO-RTO: 0 /100 WBCS
OPIATES UR QL SCN: NEGATIVE
OXYCODONE+OXYMORPHONE UR QL SCN: NEGATIVE
PCP UR QL: NEGATIVE
PLATELET # BLD AUTO: 216 THOUSANDS/UL (ref 149–390)
PMV BLD AUTO: 11.4 FL (ref 8.9–12.7)
POTASSIUM SERPL-SCNC: 4 MMOL/L (ref 3.5–5.3)
PROT SERPL-MCNC: 6.5 G/DL (ref 6.4–8.4)
RBC # BLD AUTO: 3.79 MILLION/UL (ref 3.81–5.12)
SODIUM SERPL-SCNC: 138 MMOL/L (ref 135–147)
THC UR QL: POSITIVE
WBC # BLD AUTO: 5.45 THOUSAND/UL (ref 4.31–10.16)

## 2024-01-31 PROCEDURE — 36415 COLL VENOUS BLD VENIPUNCTURE: CPT | Performed by: EMERGENCY MEDICINE

## 2024-01-31 PROCEDURE — 99285 EMERGENCY DEPT VISIT HI MDM: CPT

## 2024-01-31 PROCEDURE — 83735 ASSAY OF MAGNESIUM: CPT | Performed by: EMERGENCY MEDICINE

## 2024-01-31 PROCEDURE — 82948 REAGENT STRIP/BLOOD GLUCOSE: CPT

## 2024-01-31 PROCEDURE — 93005 ELECTROCARDIOGRAM TRACING: CPT

## 2024-01-31 PROCEDURE — 70496 CT ANGIOGRAPHY HEAD: CPT

## 2024-01-31 PROCEDURE — 80053 COMPREHEN METABOLIC PANEL: CPT | Performed by: EMERGENCY MEDICINE

## 2024-01-31 PROCEDURE — 84484 ASSAY OF TROPONIN QUANT: CPT | Performed by: EMERGENCY MEDICINE

## 2024-01-31 PROCEDURE — 96374 THER/PROPH/DIAG INJ IV PUSH: CPT

## 2024-01-31 PROCEDURE — G1004 CDSM NDSC: HCPCS

## 2024-01-31 PROCEDURE — 96361 HYDRATE IV INFUSION ADD-ON: CPT

## 2024-01-31 PROCEDURE — 71045 X-RAY EXAM CHEST 1 VIEW: CPT

## 2024-01-31 PROCEDURE — 85025 COMPLETE CBC W/AUTO DIFF WBC: CPT | Performed by: EMERGENCY MEDICINE

## 2024-01-31 PROCEDURE — 96375 TX/PRO/DX INJ NEW DRUG ADDON: CPT

## 2024-01-31 PROCEDURE — 80307 DRUG TEST PRSMV CHEM ANLYZR: CPT | Performed by: EMERGENCY MEDICINE

## 2024-01-31 PROCEDURE — 99285 EMERGENCY DEPT VISIT HI MDM: CPT | Performed by: EMERGENCY MEDICINE

## 2024-01-31 PROCEDURE — 70498 CT ANGIOGRAPHY NECK: CPT

## 2024-01-31 RX ORDER — FLUTICASONE PROPIONATE 50 MCG
1 SPRAY, SUSPENSION (ML) NASAL DAILY
Qty: 16 G | Refills: 0 | Status: SHIPPED | OUTPATIENT
Start: 2024-01-31

## 2024-01-31 RX ORDER — DIPHENHYDRAMINE HYDROCHLORIDE 50 MG/ML
25 INJECTION INTRAMUSCULAR; INTRAVENOUS ONCE
Status: COMPLETED | OUTPATIENT
Start: 2024-01-31 | End: 2024-01-31

## 2024-01-31 RX ORDER — LANOLIN ALCOHOL/MO/W.PET/CERES
400 CREAM (GRAM) TOPICAL ONCE
Status: COMPLETED | OUTPATIENT
Start: 2024-01-31 | End: 2024-01-31

## 2024-01-31 RX ORDER — DEXAMETHASONE SODIUM PHOSPHATE 4 MG/ML
10 INJECTION, SOLUTION INTRA-ARTICULAR; INTRALESIONAL; INTRAMUSCULAR; INTRAVENOUS; SOFT TISSUE ONCE
Status: COMPLETED | OUTPATIENT
Start: 2024-01-31 | End: 2024-01-31

## 2024-01-31 RX ORDER — METOCLOPRAMIDE HYDROCHLORIDE 5 MG/ML
10 INJECTION INTRAMUSCULAR; INTRAVENOUS ONCE
Status: COMPLETED | OUTPATIENT
Start: 2024-01-31 | End: 2024-01-31

## 2024-01-31 RX ADMIN — DIPHENHYDRAMINE HYDROCHLORIDE 25 MG: 50 INJECTION, SOLUTION INTRAMUSCULAR; INTRAVENOUS at 12:33

## 2024-01-31 RX ADMIN — METOCLOPRAMIDE 10 MG: 5 INJECTION, SOLUTION INTRAMUSCULAR; INTRAVENOUS at 12:31

## 2024-01-31 RX ADMIN — SODIUM CHLORIDE 1000 ML: 0.9 INJECTION, SOLUTION INTRAVENOUS at 12:06

## 2024-01-31 RX ADMIN — IOHEXOL 85 ML: 350 INJECTION, SOLUTION INTRAVENOUS at 12:56

## 2024-01-31 RX ADMIN — Medication 400 MG: at 15:21

## 2024-01-31 RX ADMIN — DEXAMETHASONE SODIUM PHOSPHATE 10 MG: 4 INJECTION INTRA-ARTICULAR; INTRALESIONAL; INTRAMUSCULAR; INTRAVENOUS; SOFT TISSUE at 12:35

## 2024-01-31 NOTE — ED PROVIDER NOTES
History  Chief Complaint   Patient presents with    Dizziness    Headache     Patient arrives ambulatory to ED, AO x 4 c/o headache x 1 week and dizziness today.  +Nausea.  Hx bradycardia.    Nausea     Patient is a 48-year-old female with a history of anxiety, bradycardia, hypothyroidism that presents to the emergency department with complaint of dizziness and a generalized headache that began approximately 1 hour prior to arrival.  She complains of nausea without vomiting.  She denies chest pain or shortness of breath.       History provided by:  Patient   used: No        Prior to Admission Medications   Prescriptions Last Dose Informant Patient Reported? Taking?   LORazepam (ATIVAN) 1 mg tablet   No No   Sig: Take 1 tablet (1 mg total) by mouth daily as needed for anxiety   albuterol (Proventil HFA) 90 mcg/act inhaler   No No   Sig: Inhale 2 puffs 4 (four) times a day as needed for wheezing   busPIRone (BUSPAR) 5 mg tablet   No No   Sig: Take 1 tablet (5 mg total) by mouth 2 (two) times a day   cholecalciferol (VITAMIN D3) 25 mcg (1,000 units) tablet  Self Yes No   ergocalciferol (ERGOCALCIFEROL) 1.25 MG (93642 UT) capsule   No No   Sig: Take 1 capsule (50,000 Units total) by mouth once a week   fluconazole (DIFLUCAN) 150 mg tablet   Yes No   Sig: if needed   levothyroxine 200 mcg tablet   No No   Sig: Take 1 tablet (200 mcg total) by mouth daily   liothyronine (CYTOMEL) 5 mcg tablet   No No   Sig: Take 1 tablet (5 mcg total) by mouth daily   lisinopril (ZESTRIL) 5 mg tablet   No No   Sig: Take 1 tablet (5 mg total) by mouth daily   meloxicam (MOBIC) 15 mg tablet   No No   Sig: Take 1 tablet (15 mg total) by mouth daily As needed   Patient not taking: Reported on 12/14/2023   montelukast (SINGULAIR) 10 mg tablet   No No   Sig: Take 1 tablet (10 mg total) by mouth daily   omeprazole (PriLOSEC) 40 MG capsule  Self No No   Sig: Take 1 capsule (40 mg total) by mouth daily   traZODone (DESYREL) 50  mg tablet   No No   Sig: Take 1 tablet (50 mg total) by mouth daily   vitamin B-12 (VITAMIN B-12) 1,000 mcg tablet  Self Yes No   Sig: Take by mouth daily      Facility-Administered Medications: None       Past Medical History:   Diagnosis Date    Allergic     Anxiety     Asthma     Cardiomyopathy (HCC)     bradycardia normal 50-55 bpm    Depression     Disease of thyroid gland     Interstitial cystitis        Past Surgical History:   Procedure Laterality Date    BARIATRIC SURGERY      x2 surgeries lap band and bypass    BREAST BIOPSY Left     usg bx negative     SECTION      x2    EAR RECONSTRUCTION Left     age 6 skin drum skin graft    HYSTERECTOMY         Family History   Problem Relation Age of Onset    Cancer Mother     Cervical cancer Mother     Arthritis Mother     Asthma Father     Arthritis Father     Breast cancer Cousin         age unknown     I have reviewed and agree with the history as documented.    E-Cigarette/Vaping    E-Cigarette Use Never User      E-Cigarette/Vaping Substances    Nicotine No     THC No     CBD No     Flavoring No     Other No     Unknown No      Social History     Tobacco Use    Smoking status: Never     Passive exposure: Never    Smokeless tobacco: Never   Vaping Use    Vaping status: Never Used   Substance Use Topics    Alcohol use: Yes     Alcohol/week: 1.0 standard drink of alcohol     Types: 1 Cans of beer per week     Comment: social    Drug use: Yes     Types: Marijuana       Review of Systems   Constitutional:  Negative for chills and fever.   Respiratory:  Negative for cough, chest tightness and shortness of breath.    Gastrointestinal:  Positive for nausea. Negative for abdominal pain, diarrhea and vomiting.   Genitourinary:  Negative for dysuria, frequency, hematuria and urgency.   Musculoskeletal:  Negative for back pain, neck pain and neck stiffness.   Neurological:  Positive for headaches. Negative for tremors and weakness.   Psychiatric/Behavioral:   The patient is nervous/anxious.    All other systems reviewed and are negative.      Physical Exam  Physical Exam  Vitals and nursing note reviewed.   Constitutional:       General: She is not in acute distress.     Appearance: She is well-developed. She is not diaphoretic.   HENT:      Head: Normocephalic and atraumatic.   Eyes:      Conjunctiva/sclera: Conjunctivae normal.      Pupils: Pupils are equal, round, and reactive to light.   Cardiovascular:      Rate and Rhythm: Normal rate and regular rhythm.      Heart sounds: Normal heart sounds. No murmur heard.  Pulmonary:      Effort: Pulmonary effort is normal. No respiratory distress.      Breath sounds: Normal breath sounds.   Abdominal:      General: Bowel sounds are normal. There is no distension.      Palpations: Abdomen is soft.      Tenderness: There is no abdominal tenderness.   Musculoskeletal:         General: No deformity. Normal range of motion.      Cervical back: Normal range of motion and neck supple.   Skin:     General: Skin is warm and dry.      Capillary Refill: Capillary refill takes less than 2 seconds.      Coloration: Skin is not pale.      Findings: No rash.   Neurological:      General: No focal deficit present.      Mental Status: She is alert and oriented to person, place, and time.      Cranial Nerves: No cranial nerve deficit.   Psychiatric:         Mood and Affect: Mood normal.         Behavior: Behavior normal.         Thought Content: Thought content normal.         Judgment: Judgment normal.         Vital Signs  ED Triage Vitals   Temperature Pulse Respirations Blood Pressure SpO2   01/31/24 1136 01/31/24 1136 01/31/24 1136 01/31/24 1136 01/31/24 1136   97.7 °F (36.5 °C) (!) 53 18 112/52 97 %      Temp Source Heart Rate Source Patient Position - Orthostatic VS BP Location FiO2 (%)   01/31/24 1136 01/31/24 1229 01/31/24 1136 01/31/24 1136 --   Tympanic Monitor Sitting Left arm       Pain Score       --                  Vitals:     01/31/24 1315 01/31/24 1348 01/31/24 1400 01/31/24 1515   BP:  107/56 107/56    Pulse: (!) 44 (!) 47 (!) 40 (!) 40   Patient Position - Orthostatic VS:  Lying Lying          Visual Acuity      ED Medications  Medications   dexamethasone (DECADRON) injection 10 mg (10 mg Intravenous Given 1/31/24 1235)   metoclopramide (REGLAN) injection 10 mg (10 mg Intravenous Given 1/31/24 1231)   diphenhydrAMINE (BENADRYL) injection 25 mg (25 mg Intravenous Given 1/31/24 1233)   sodium chloride 0.9 % bolus 1,000 mL (0 mL Intravenous Stopped 1/31/24 1412)   iohexol (OMNIPAQUE) 350 MG/ML injection (SINGLE-DOSE) 85 mL (85 mL Intravenous Given 1/31/24 1256)   magnesium Oxide (MAG-OX) tablet 400 mg (400 mg Oral Given 1/31/24 1521)       Diagnostic Studies  Results Reviewed       Procedure Component Value Units Date/Time    HS Troponin I 2hr [[id number]]  (Normal) Collected: 01/31/24 1410    Lab Status: Final result Specimen: Blood from Arm, Right Updated: 01/31/24 1446     hs TnI 2hr 4 ng/L      Delta 2hr hsTnI 1 ng/L     Rapid drug screen, urine [[id number]]  (Abnormal) Collected: 01/31/24 1219    Lab Status: Final result Specimen: Urine, Clean Catch Updated: 01/31/24 1243     Amph/Meth UR Negative     Barbiturate Ur Negative     Benzodiazepine Urine Negative     Cocaine Urine Negative     Methadone Urine Negative     Opiate Urine Negative     PCP Ur Negative     THC Urine Positive     Oxycodone Urine Negative    Narrative:      Presumptive report. If requested, specimen will be sent to reference lab for confirmation.  FOR MEDICAL PURPOSES ONLY.   IF CONFIRMATION NEEDED PLEASE CONTACT THE LAB WITHIN 5 DAYS.    Drug Screen Cutoff Levels:  AMPHETAMINE/METHAMPHETAMINES  1000 ng/mL  BARBITURATES     200 ng/mL  BENZODIAZEPINES     200 ng/mL  COCAINE      300 ng/mL  METHADONE      300 ng/mL  OPIATES      300 ng/mL  PHENCYCLIDINE     25 ng/mL  THC       50 ng/mL  OXYCODONE      100 ng/mL    HS Troponin 0hr (reflex protocol) [[id number]]   (Normal) Collected: 01/31/24 1204    Lab Status: Final result Specimen: Blood from Arm, Right Updated: 01/31/24 1242     hs TnI 0hr 3 ng/L     Comprehensive metabolic panel [[id number]] Collected: 01/31/24 1204    Lab Status: Final result Specimen: Blood from Arm, Right Updated: 01/31/24 1237     Sodium 138 mmol/L      Potassium 4.0 mmol/L      Chloride 107 mmol/L      CO2 29 mmol/L      ANION GAP 2 mmol/L      BUN 10 mg/dL      Creatinine 0.69 mg/dL      Glucose 89 mg/dL      Calcium 9.1 mg/dL      AST 18 U/L      ALT 14 U/L      Alkaline Phosphatase 66 U/L      Total Protein 6.5 g/dL      Albumin 3.7 g/dL      Total Bilirubin 0.67 mg/dL      eGFR 101 ml/min/1.73sq m     Narrative:      National Kidney Disease Foundation guidelines for Chronic Kidney Disease (CKD):     Stage 1 with normal or high GFR (GFR > 90 mL/min/1.73 square meters)    Stage 2 Mild CKD (GFR = 60-89 mL/min/1.73 square meters)    Stage 3A Moderate CKD (GFR = 45-59 mL/min/1.73 square meters)    Stage 3B Moderate CKD (GFR = 30-44 mL/min/1.73 square meters)    Stage 4 Severe CKD (GFR = 15-29 mL/min/1.73 square meters)    Stage 5 End Stage CKD (GFR <15 mL/min/1.73 square meters)  Note: GFR calculation is accurate only with a steady state creatinine    Magnesium [[id number]]  (Abnormal) Collected: 01/31/24 1212    Lab Status: Final result Specimen: Blood from Arm, Right Updated: 01/31/24 1235     Magnesium 1.7 mg/dL     CBC and differential [[id number]]  (Abnormal) Collected: 01/31/24 1204    Lab Status: Final result Specimen: Blood from Arm, Right Updated: 01/31/24 1221     WBC 5.45 Thousand/uL      RBC 3.79 Million/uL      Hemoglobin 10.4 g/dL      Hematocrit 32.1 %      MCV 85 fL      MCH 27.4 pg      MCHC 32.4 g/dL      RDW 14.5 %      MPV 11.4 fL      Platelets 216 Thousands/uL      nRBC 0 /100 WBCs      Neutrophils Relative 51 %      Immat GRANS % 0 %      Lymphocytes Relative 35 %      Monocytes Relative 12 %      Eosinophils Relative 1 %       Basophils Relative 1 %      Neutrophils Absolute 2.78 Thousands/µL      Immature Grans Absolute 0.01 Thousand/uL      Lymphocytes Absolute 1.92 Thousands/µL      Monocytes Absolute 0.65 Thousand/µL      Eosinophils Absolute 0.06 Thousand/µL      Basophils Absolute 0.03 Thousands/µL     Fingerstick Glucose (POCT) [[id number]]  (Normal) Collected: 01/31/24 1159    Lab Status: Final result Updated: 01/31/24 1200     POC Glucose 94 mg/dl                    XR chest 1 view portable   ED Interpretation by Leah Smith DO (01/31 1311)   nad      Final Result by Jo Ann Bolden MD (01/31 1551)      No acute cardiopulmonary disease.                  Workstation performed: [id number]         CTA head and neck with and without contrast   Final Result by Gm Medellin DO (01/31 1404)      CT brain: No acute intracranial abnormality.      CT angiography: Normal cervical and intracranial vasculature.      Other findings: Complete opacification of a slightly hypoplastic left maxillary sinus.                  Workstation performed: [id number]                    Procedures  ECG 12 Lead Documentation Only    Date/Time: 1/31/2024 11:45 AM    Performed by: Leah Smith DO  Authorized by: Leah Smith DO    Indications / Diagnosis:  Dizziness  ECG reviewed by me, the ED Provider: yes    Patient location:  ED  Previous ECG:     Previous ECG:  Unavailable    Comparison to cardiac monitor: Yes    Interpretation:     Interpretation: abnormal    Rate:     ECG rate:  45    ECG rate assessment: bradycardic    Rhythm:     Rhythm: sinus bradycardia    Ectopy:     Ectopy: none    QRS:     QRS axis:  Normal  Conduction:     Conduction: normal    ST segments:     ST segments:  Normal  T waves:     T waves: normal             ED Course                               SBIRT 22yo+      Flowsheet Row Most Recent Value   Initial Alcohol Screen: US AUDIT-C     1. How often do you have a drink containing alcohol? 2 Filed  at: 01/31/2024 1137   2. How many drinks containing alcohol do you have on a typical day you are drinking?  0 Filed at: 01/31/2024 1137   3b. FEMALE Any Age, or MALE 65+: How often do you have 4 or more drinks on one occassion? 0 Filed at: 01/31/2024 1137   Audit-C Score 2 Filed at: 01/31/2024 1137   MENDY: How many times in the past year have you...    Used an illegal drug or used a prescription medication for non-medical reasons? Never Filed at: 01/31/2024 1137                      Medical Decision Making  48-year-old female in the ED with complaint of sudden onset of dizziness, headache and nausea.  Labs, CTA, chest x-ray, EKG ordered and reviewed.  Urine drug screen is positive for marijuana, which patient admits to. Patient treated in the ED with a migraine cocktail with resolution of symptoms. CT shows complete of opacification of left maxillary sinus.  Hypomagnesemia noted and repleted in the ED.  Patient persistently bradycardic but asymptomatic.  Will start patient on course of Flonase, with recommendation for outpatient follow-up with ENT.  Return precautions reviewed patient.    Amount and/or Complexity of Data Reviewed  Labs: ordered.  Radiology: ordered and independent interpretation performed.    Risk  OTC drugs.  Prescription drug management.             Disposition  Final diagnoses:   Headache   Dizziness   Sinusitis     Time reflects when diagnosis was documented in both MDM as applicable and the Disposition within this note       Time User Action Codes Description Comment    1/31/2024  3:14 PM Oyesanmi, Olubusola O Add [R51.9] Headache     1/31/2024  3:14 PM Oyesanmi, Olubusola O Add [R42] Dizziness     1/31/2024  3:14 PM Oyesanmi, Olubusola O Add [J32.9] Sinusitis           ED Disposition       ED Disposition   Discharge    Condition   Stable    Date/Time   Wed Jan 31, 2024 1514    Comment   Sweta Campbell discharge to home/self care.                   Follow-up Information       Follow up With  Specialties Details Why Contact Info Additional Information    Frank Lombardi, DO Family Medicine, Wound Care Schedule an appointment as soon as possible for a visit in 1 day for follow up [address]  [address]  [facility] [address]  [phone]       [facility] Otolaryngology Schedule an appointment as soon as possible for a visit in 1 day for follow up [facility]  [facility] [address] [phone] [facility], [address] [place] [address], [facility] [address], [place], [address], [phone]            Discharge Medication List as of 1/31/2024  3:19 PM        START taking these medications    Details   fluticasone (FLONASE) 50 mcg/act nasal spray 1 spray into each nostril daily, Starting Wed 1/31/2024, Normal           CONTINUE these medications which have NOT CHANGED    Details   albuterol (Proventil HFA) 90 mcg/act inhaler Inhale 2 puffs 4 (four) times a day as needed for wheezing, Starting Wed 12/27/2023, Normal      busPIRone (BUSPAR) 5 mg tablet Take 1 tablet (5 mg total) by mouth 2 (two) times a day, Starting Wed 1/3/2024, Normal      cholecalciferol (VITAMIN D3) 25 mcg (1,000 units) tablet Historical Med      ergocalciferol (ERGOCALCIFEROL) 1.25 MG (98747 UT) capsule Take 1 capsule (50,000 Units total) by mouth once a week, Starting Wed 11/15/2023, Normal      fluconazole (DIFLUCAN) 150 mg tablet if needed, Starting Sun 9/10/2023, Historical Med      levothyroxine 200 mcg tablet Take 1 tablet (200 mcg total) by mouth daily, Starting Tue 10/31/2023, Normal      liothyronine (CYTOMEL) 5 mcg tablet Take 1 tablet (5 mcg total) by mouth daily, Starting Tue 10/31/2023, Normal      lisinopril (ZESTRIL) 5 mg tablet Take 1 tablet (5 mg total) by mouth daily, Starting Tue 10/31/2023, Normal      LORazepam (ATIVAN) 1 mg tablet Take 1 tablet (1 mg total) by mouth daily as needed for anxiety, Starting Wed 1/3/2024, Normal      meloxicam (MOBIC) 15 mg tablet Take 1 tablet  (15 mg total) by mouth daily As needed, Starting Wed 11/15/2023, Until Tue 2/13/2024, Normal      montelukast (SINGULAIR) 10 mg tablet Take 1 tablet (10 mg total) by mouth daily, Starting Tue 10/31/2023, Normal      omeprazole (PriLOSEC) 40 MG capsule Take 1 capsule (40 mg total) by mouth daily, Starting Tue 4/5/2022, Normal      traZODone (DESYREL) 50 mg tablet Take 1 tablet (50 mg total) by mouth daily, Starting Tue 10/31/2023, Normal      vitamin B-12 (VITAMIN B-12) 1,000 mcg tablet Take by mouth daily, Historical Med             No discharge procedures on file.    PDMP Review       None            ED Provider  Electronically Signed by             Leah Smith DO  02/01/24 0992

## 2024-01-31 NOTE — DISCHARGE INSTRUCTIONS
Return to the ER for further concerns or worsening symptoms  Follow up with your primary care physician and ENT in 1-2 days  Take medication as prescribed

## 2024-01-31 NOTE — ED NOTES
Dr. Azul made aware of HR of 39 with this patient.  Dr. Smith said that this is patient baseline when asleep.  Patient is asymptomatic and she told RN that she usually gets this low for HR.     Rachel Varela RN  01/31/24 9202

## 2024-02-01 LAB
ATRIAL RATE: 45 BPM
P AXIS: 37 DEGREES
PR INTERVAL: 172 MS
QRS AXIS: 43 DEGREES
QRSD INTERVAL: 88 MS
QT INTERVAL: 506 MS
QTC INTERVAL: 437 MS
T WAVE AXIS: -6 DEGREES
VENTRICULAR RATE: 45 BPM

## 2024-02-05 ENCOUNTER — TELEPHONE (OUTPATIENT)
Dept: FAMILY MEDICINE CLINIC | Facility: CLINIC | Age: 49
End: 2024-02-05

## 2024-02-05 NOTE — TELEPHONE ENCOUNTER
----- Message from Alyssa Monsalve sent at 2/2/2024 10:23 AM EST -----  Regarding: FW: Psych referral   Contact: [phone]    ----- Message -----  From: Irene Benjamin RN  Sent: 2/2/2024  10:09 AM EST  To: [facility]  Subject: FW: Psych referral

## 2024-02-12 DIAGNOSIS — F41.9 ANXIETY: ICD-10-CM

## 2024-02-14 RX ORDER — BUSPIRONE HYDROCHLORIDE 5 MG/1
5 TABLET ORAL 2 TIMES DAILY
Qty: 60 TABLET | Refills: 0 | Status: SHIPPED | OUTPATIENT
Start: 2024-02-14

## 2024-02-21 PROBLEM — Z13.220 SCREENING, LIPID: Status: RESOLVED | Noted: 2020-06-30 | Resolved: 2024-02-21

## 2024-02-26 ENCOUNTER — TELEPHONE (OUTPATIENT)
Dept: PSYCHIATRY | Facility: CLINIC | Age: 49
End: 2024-02-26

## 2024-02-26 NOTE — TELEPHONE ENCOUNTER
Patient has been added to the Medication Management wait list with a referral.    Insurance: [employer] [facility]  Insurance Type:    Commercial [x]   Medicaid []   County (if applicable)   Medicare []  Location Preference: [place]  Provider Preference: male or female  Virtual: Yes [x] No []  Were outside resources sent: Yes [x] No []    Patient has been added to the Talk Therapy wait list with a referral.    Insurance: [employer] [place]  Insurance Type:    Commercial [x]   Medicaid []   County (if applicable)   Medicare []  Location Preference: [place]  Provider Preference: Female  Virtual: Yes [x] No []  Were outside resources sent: Yes [x] No []

## 2024-02-27 NOTE — TELEPHONE ENCOUNTER
IC called pt from the wait list and left voice message about scheduling a med management appt in the '[facility] office.

## 2024-03-05 ENCOUNTER — TELEPHONE (OUTPATIENT)
Dept: PSYCHIATRY | Facility: CLINIC | Age: 49
End: 2024-03-05

## 2024-03-05 NOTE — TELEPHONE ENCOUNTER
"Behavioral Health Outpatient Intake Questions    Referred By   : [facility]    Please advise interviewee that they need to answer all questions truthfully to allow for best care, and any misrepresentations of information may affect their ability to be seen at this clinic   => Was this discussed? Yes     If Minor Child (under age 18)    Who is/are the legal guardian(s) of the child?     Is there a custody agreement?      If \"YES\"- Custody orders must be obtained prior to scheduling the first appointment  In addition, Consent to Treatment must be signed by all legal guardians prior to scheduling the first appointment    If \"NO\"- Consent to Treatment must be signed by all legal guardians prior to scheduling the first appointment    Behavioral Health Outpatient Intake History -     Presenting Problem (in patient's own words): Anxiety, PTSD, Depression    Are there any communication barriers for this patient?     No                                               If yes, please describe barriers:   If there is a unique situation, please refer to Rey Jurado/Linda Chaney for final determination.    Are you taking any psychiatric medications? Yes     If \"YES\" -What are they Lamictal, Buspar, Ativan, Trazodone     If \"YES\" -Who prescribes? Frank Lombardi    Has the Patient previously received outpatient Talk Therapy or Medication Management from [facility]  No        If \"YES\"- When, Where and with Whom?         If \"NO\" -Has Patient received these services elsewhere?       If \"YES\" -When, Where, and with Whom? Currently doing therapy telehealth thru [facility], which will be ending soon    Has the Patient abused alcohol or other substances in the last 6 months ? No  No concerns of substance abuse are reported.     If \"YES\" -What substance, How much, How often? Pt reports smoking marijuana at times to help sleep     If illegal substance: Refer to [facility] (for MEHDI) or [facility] Offices.   If Alcohol in " "excess of 10 drinks per week:  Refer to [facility] (for ME[facility]) or [facility] Offices    Legal History-     Is this treatment court ordered? No   If \"yes \"send to :  Talk Therapy : Send to Rey Chaney for final determination   Med Management: Send to Dr Ramos for final determination     Has the Patient been convicted of a felony?  No   If \"Yes\" send to -When, What?  Talk Therapy : Send to Rey Chaney for final determination   Med Management: Send to Dr Ramos for final determination     ACCEPTED as a patient Yes  If \"Yes\" Appointment Date: with Aldo Topete  Monday, April 15, 2024 @ 5:00pm  Monday, May 13, 2024 @ 6:00pm    Referred Elsewhere? No  If “Yes” - (Where? Ex: [facility], [facility], [facility], [facility], etc.)       Name of Insurance Co: [facility] [employer] [place]  Insurance ID# [id number]  Insurance Phone #   If ins is primary or secondary? primary  If patient is a minor, parents information such as Name, D.O.B of guarantor.    RTE for appts can be ran as of 3/31/2024.  "

## 2024-03-12 DIAGNOSIS — F41.9 ANXIETY: ICD-10-CM

## 2024-03-12 RX ORDER — BUSPIRONE HYDROCHLORIDE 5 MG/1
5 TABLET ORAL 2 TIMES DAILY
Qty: 60 TABLET | Refills: 0 | Status: SHIPPED | OUTPATIENT
Start: 2024-03-12

## 2024-03-13 ENCOUNTER — RA CDI HCC (OUTPATIENT)
Dept: OTHER | Facility: HOSPITAL | Age: 49
End: 2024-03-13

## 2024-03-13 NOTE — PROGRESS NOTES
HCC coding opportunities       Chart reviewed, no opportunity found: CHART REVIEWED, NO OPPORTUNITY FOUND        Patients Insurance        Commercial Insurance: [employer]

## 2024-03-20 ENCOUNTER — TELEPHONE (OUTPATIENT)
Dept: FAMILY MEDICINE CLINIC | Facility: CLINIC | Age: 49
End: 2024-03-20

## 2024-03-20 ENCOUNTER — OFFICE VISIT (OUTPATIENT)
Dept: FAMILY MEDICINE CLINIC | Facility: CLINIC | Age: 49
End: 2024-03-20
Payer: COMMERCIAL

## 2024-03-20 VITALS
HEART RATE: 57 BPM | TEMPERATURE: 97.6 F | HEIGHT: 63 IN | RESPIRATION RATE: 16 BRPM | SYSTOLIC BLOOD PRESSURE: 112 MMHG | WEIGHT: 169 LBS | BODY MASS INDEX: 29.95 KG/M2 | DIASTOLIC BLOOD PRESSURE: 70 MMHG

## 2024-03-20 DIAGNOSIS — F43.10 PTSD (POST-TRAUMATIC STRESS DISORDER): ICD-10-CM

## 2024-03-20 DIAGNOSIS — D50.9 IRON DEFICIENCY ANEMIA, UNSPECIFIED IRON DEFICIENCY ANEMIA TYPE: ICD-10-CM

## 2024-03-20 DIAGNOSIS — J45.20 MILD INTERMITTENT ASTHMA, UNSPECIFIED WHETHER COMPLICATED: ICD-10-CM

## 2024-03-20 DIAGNOSIS — I42.9 CARDIOMYOPATHY, UNSPECIFIED TYPE (HCC): ICD-10-CM

## 2024-03-20 DIAGNOSIS — Z00.00 WELL ADULT EXAM: Primary | ICD-10-CM

## 2024-03-20 DIAGNOSIS — F43.21 ADJUSTMENT REACTION WITH PROLONGED DEPRESSIVE REACTION: ICD-10-CM

## 2024-03-20 DIAGNOSIS — Z12.31 SCREENING MAMMOGRAM FOR HIGH-RISK PATIENT: ICD-10-CM

## 2024-03-20 DIAGNOSIS — I10 ESSENTIAL HYPERTENSION: ICD-10-CM

## 2024-03-20 DIAGNOSIS — E03.9 HYPOTHYROIDISM, UNSPECIFIED TYPE: ICD-10-CM

## 2024-03-20 DIAGNOSIS — F41.1 GENERALIZED ANXIETY DISORDER: ICD-10-CM

## 2024-03-20 DIAGNOSIS — F41.9 ANXIETY: ICD-10-CM

## 2024-03-20 PROBLEM — Z79.899 HIGH RISK MEDICATION USE: Status: RESOLVED | Noted: 2020-06-30 | Resolved: 2024-03-20

## 2024-03-20 PROCEDURE — 99396 PREV VISIT EST AGE 40-64: CPT | Performed by: FAMILY MEDICINE

## 2024-03-20 RX ORDER — TRAZODONE HYDROCHLORIDE 100 MG/1
100 TABLET ORAL DAILY
Qty: 30 TABLET | Refills: 0 | Status: SHIPPED | OUTPATIENT
Start: 2024-03-20 | End: 2024-04-19

## 2024-03-20 RX ORDER — LAMOTRIGINE 25 MG/1
25 TABLET ORAL
COMMUNITY
Start: 2024-02-01 | End: 2024-04-29

## 2024-03-20 RX ORDER — LEVOTHYROXINE SODIUM 175 UG/1
175 TABLET ORAL
Qty: 90 TABLET | Refills: 3 | Status: SHIPPED | OUTPATIENT
Start: 2024-03-20

## 2024-03-20 RX ORDER — MULTIVIT-MIN/IRON/FOLIC ACID/K 18-600-40
CAPSULE ORAL
COMMUNITY

## 2024-03-20 NOTE — TELEPHONE ENCOUNTER
Received outside medical records today through inter office. Put in Dr. Lombardi's folder for review, will send to [facility] once he has reviewed.

## 2024-03-20 NOTE — PROGRESS NOTES
FAMILY PRACTICE HEALTH MAINTENANCE OFFICE VISIT  [facility]    NAME: Sweta Campbell  AGE: 48 y.o. SEX: female  : 1975     DATE: 3/20/2024    Assessment and Plan     1. Well adult exam    2. Essential hypertension  Assessment & Plan:  stable    Orders:  -     CBC; Future; Expected date: 2024  -     Comprehensive metabolic panel; Future; Expected date: 2024  -     Lipid Panel with Direct LDL reflex; Future; Expected date: 2024  -     TSH, 3rd generation; Future; Expected date: 2024  -     CBC  -     Comprehensive metabolic panel  -     Lipid Panel with Direct LDL reflex  -     TSH, 3rd generation    3. Cardiomyopathy, unspecified type (HCC)    4. Mild intermittent asthma, unspecified whether complicated    5. Hypothyroidism, unspecified type  -     levothyroxine (Euthyrox) 175 mcg tablet; Take 1 tablet (175 mcg total) by mouth daily in the early morning  -     TSH, 3rd generation; Future  -     T4, free; Future; Expected date: 2024  -     TSH, 3rd generation  -     T4, free  -     CBC; Future; Expected date: 2024  -     Comprehensive metabolic panel; Future; Expected date: 2024  -     Lipid Panel with Direct LDL reflex; Future; Expected date: 2024  -     TSH, 3rd generation; Future; Expected date: 2024  -     CBC  -     Comprehensive metabolic panel  -     Lipid Panel with Direct LDL reflex  -     TSH, 3rd generation    6. Iron deficiency anemia, unspecified iron deficiency anemia type  -     CBC; Future; Expected date: 2024  -     Comprehensive metabolic panel; Future; Expected date: 2024  -     Lipid Panel with Direct LDL reflex; Future; Expected date: 2024  -     TSH, 3rd generation; Future; Expected date: 2024  -     CBC  -     Comprehensive metabolic panel  -     Lipid Panel with Direct LDL reflex  -     TSH, 3rd generation    7. Anxiety  -     traZODone (DESYREL) 100 mg tablet; Take 1  tablet (100 mg total) by mouth daily    8. Adjustment reaction with prolonged depressive reaction    9. PTSD (post-traumatic stress disorder)    10. Generalized anxiety disorder    11. Screening mammogram for high-risk patient  -     Mammo screening bilateral w 3d & cad; Future; Expected date: 03/20/2024      Patient Counseling:   Nutrition: Stressed importance of a well balanced diet, moderation of sodium/saturated fat, caloric balance and sufficient intake of fiber  Exercise: Stressed the importance of regular exercise with a goal of 150 minutes per week  Dental Health: Discussed daily flossing and brushing and regular dental visits     Immunizations reviewed: Up To Date  Discussed benefits of:  Colon Cancer Screening, Mammogram , Cervical Cancer screening, and Screening labs.  BMI Counseling: Body mass index is 29.94 kg/m². Discussed with patient's BMI with her. The BMI is above normal. Nutrition recommendations include reducing portion sizes.    Return in about 6 months (around 9/20/2024).        Chief Complaint     Chief Complaint   Patient presents with   • Physical Exam     Sas/cma       History of Present Illness     Pt is sched for a full physical    Pt is on meds for her anxiety - states she is having issues with her sleep.  She is worrying if she needs new meds or increase.  Pt states it was started in [facility] and states she is on a waiting list to get on meds. Pt was started on Lamictyl.  Wondering if it should be increased  Pt did call them and she was told she can come back.  Has been on the lamictyl for a month and a hlaf or so    Pt states her cardiac loop recorder was denied            Well Adult Physical   Patient here for a comprehensive physical exam.      Diet and Physical Activity  Diet: well balanced diet  Exercise: intermittently      Depression Screen  PHQ-2/9 Depression Screening    Little interest or pleasure in doing things: 1 - several days  Feeling down, depressed, or hopeless: 1 -  several days  Trouble falling or staying asleep, or sleeping too much: 1 - several days  Feeling tired or having little energy: 1 - several days  Poor appetite or overeatin - not at all  Feeling bad about yourself - or that you are a failure or have let yourself or your family down: 1 - several days  Trouble concentrating on things, such as reading the newspaper or watching television: 1 - several days  Moving or speaking so slowly that other people could have noticed. Or the opposite - being so fidgety or restless that you have been moving around a lot more than usual: 0 - not at all  Thoughts that you would be better off dead, or of hurting yourself in some way: 0 - not at all  PHQ-9 Score: 6  PHQ-9 Interpretation: Mild depression          General Health  Hearing: Normal:  bilateral  Vision: wears glasses  Dental: regular dental visits    Reproductive Health  No issues       The following portions of the patient's history were reviewed and updated as appropriate: allergies, current medications, past family history, past medical history, past social history, past surgical history and problem list.    Review of Systems     Review of Systems   Constitutional: Negative.  Negative for activity change, appetite change, chills, diaphoresis and fatigue.   HENT: Negative.  Negative for dental problem, ear pain, sinus pressure and sore throat.    Eyes: Negative.  Negative for photophobia, pain, discharge, redness, itching and visual disturbance.   Respiratory:  Negative for apnea and chest tightness.    Cardiovascular: Negative.  Negative for chest pain, palpitations and leg swelling.   Gastrointestinal: Negative.  Negative for abdominal distention, abdominal pain, constipation and diarrhea.   Endocrine: Negative.  Negative for cold intolerance and heat intolerance.   Genitourinary: Negative.  Negative for difficulty urinating and dyspareunia.   Musculoskeletal: Negative.  Negative for arthralgias and back pain.   Skin:  Negative.    Allergic/Immunologic: Negative for environmental allergies.   Neurological: Negative.  Negative for dizziness.   Psychiatric/Behavioral: Negative.  Negative for agitation.        Past Medical History     Past Medical History:   Diagnosis Date   • Allergic    • Anxiety    • Asthma    • Cardiomyopathy (HCC)     bradycardia normal 50-55 bpm   • Depression    • Disease of thyroid gland    • Interstitial cystitis        Past Surgical History     Past Surgical History:   Procedure Laterality Date   • BARIATRIC SURGERY      x2 surgeries lap band and bypass   • BREAST BIOPSY Left     usg bx negative   •  SECTION      x2   • EAR RECONSTRUCTION Left     age 6 skin drum skin graft   • HYSTERECTOMY         Social History     Social History     Socioeconomic History   • Marital status: Legally      Spouse name: None   • Number of children: None   • Years of education: None   • Highest education level: None   Occupational History   • None   Tobacco Use   • Smoking status: Never     Passive exposure: Never   • Smokeless tobacco: Never   Vaping Use   • Vaping status: Never Used   Substance and Sexual Activity   • Alcohol use: Yes     Alcohol/week: 1.0 standard drink of alcohol     Types: 1 Cans of beer per week     Comment: social   • Drug use: Yes     Types: Marijuana   • Sexual activity: None   Other Topics Concern   • None   Social History Narrative   • None     Social Determinants of Health     Financial Resource Strain: Not on file   Food Insecurity: No Food Insecurity (2023)    Hunger Vital Sign    • Worried About Running Out of Food in the Last Year: Never true    • Ran Out of Food in the Last Year: Never true   Transportation Needs: No Transportation Needs (2023)    PRAPARE - Transportation    • Lack of Transportation (Medical): No    • Lack of Transportation (Non-Medical): No   Physical Activity: Not on file   Stress: Not on file   Social Connections: Not on file   Intimate  Partner Violence: Not on file   Housing Stability: Low Risk  (6/27/2023)    Housing Stability Vital Sign    • Unable to Pay for Housing in the Last Year: No    • Number of Places Lived in the Last Year: 1    • Unstable Housing in the Last Year: No       Family History     Family History   Problem Relation Age of Onset   • Cancer Mother    • Cervical cancer Mother    • Arthritis Mother    • Asthma Father    • Arthritis Father    • Breast cancer Cousin         age unknown       Current Medications       Current Outpatient Medications:   •  albuterol (Proventil HFA) 90 mcg/act inhaler, Inhale 2 puffs 4 (four) times a day as needed for wheezing, Disp: 1 g, Rfl: 0  •  busPIRone (BUSPAR) 5 mg tablet, Take 1 tablet (5 mg total) by mouth 2 (two) times a day, Disp: 60 tablet, Rfl: 0  •  cholecalciferol (VITAMIN D3) 25 mcg (1,000 units) tablet, , Disp: , Rfl:   •  fluconazole (DIFLUCAN) 150 mg tablet, if needed, Disp: , Rfl:   •  fluticasone (FLONASE) 50 mcg/act nasal spray, 1 spray into each nostril daily, Disp: 16 g, Rfl: 0  •  lamoTRIgine (LaMICtal) 25 mg tablet, Take 25 mg by mouth, Disp: , Rfl:   •  levothyroxine (Euthyrox) 175 mcg tablet, Take 1 tablet (175 mcg total) by mouth daily in the early morning, Disp: 90 tablet, Rfl: 3  •  liothyronine (CYTOMEL) 5 mcg tablet, Take 1 tablet (5 mcg total) by mouth daily, Disp: 90 tablet, Rfl: 1  •  lisinopril (ZESTRIL) 5 mg tablet, Take 1 tablet (5 mg total) by mouth daily, Disp: 90 tablet, Rfl: 1  •  LORazepam (ATIVAN) 1 mg tablet, Take 1 tablet (1 mg total) by mouth daily as needed for anxiety, Disp: 30 tablet, Rfl: 0  •  montelukast (SINGULAIR) 10 mg tablet, Take 1 tablet (10 mg total) by mouth daily, Disp: 90 tablet, Rfl: 1  •  Multiple Vitamins-Minerals (Multi For Her 50+) CAPS, Take by mouth, Disp: , Rfl:   •  omeprazole (PriLOSEC) 40 MG capsule, Take 1 capsule (40 mg total) by mouth daily, Disp: 30 capsule, Rfl: 2  •  traZODone (DESYREL) 100 mg tablet, Take 1 tablet (100  "mg total) by mouth daily, Disp: 30 tablet, Rfl: 0  •  vitamin B-12 (VITAMIN B-12) 1,000 mcg tablet, Take by mouth daily, Disp: , Rfl:      Allergies     No Known Allergies    Objective     /70   Pulse 57   Temp 97.6 °F (36.4 °C)   Resp 16   Ht 5' 3\" (1.6 m)   Wt 76.7 kg (169 lb)   BMI 29.94 kg/m²      Physical Exam  Vitals and nursing note reviewed.   Constitutional:       General: She is not in acute distress.     Appearance: She is well-developed. She is not diaphoretic.   HENT:      Head: Normocephalic and atraumatic.      Right Ear: External ear normal.      Left Ear: External ear normal.      Nose: Nose normal.      Mouth/Throat:      Pharynx: No oropharyngeal exudate.   Eyes:      General: No scleral icterus.        Right eye: No discharge.         Left eye: No discharge.      Pupils: Pupils are equal, round, and reactive to light.   Neck:      Thyroid: No thyromegaly.   Cardiovascular:      Rate and Rhythm: Normal rate.      Heart sounds: Normal heart sounds. No murmur heard.  Pulmonary:      Effort: Pulmonary effort is normal. No respiratory distress.      Breath sounds: Normal breath sounds. No wheezing.   Abdominal:      General: Bowel sounds are normal. There is no distension.      Palpations: Abdomen is soft. There is no mass.      Tenderness: There is no abdominal tenderness. There is no guarding or rebound.   Musculoskeletal:         General: Normal range of motion.   Skin:     General: Skin is warm and dry.      Findings: No erythema or rash.   Neurological:      Mental Status: She is alert.      Coordination: Coordination normal.      Deep Tendon Reflexes: Reflexes normal.   Psychiatric:         Behavior: Behavior normal.           Vision Screening    Right eye Left eye Both eyes   Without correction      With correction 20/15 20/20 20/15           Frank Lombardi, DO [facility]  "

## 2024-04-14 ENCOUNTER — OFFICE VISIT (OUTPATIENT)
Dept: URGENT CARE | Facility: CLINIC | Age: 49
End: 2024-04-14
Payer: COMMERCIAL

## 2024-04-14 VITALS
TEMPERATURE: 97.5 F | BODY MASS INDEX: 30.11 KG/M2 | HEART RATE: 62 BPM | RESPIRATION RATE: 14 BRPM | WEIGHT: 170 LBS | OXYGEN SATURATION: 98 %

## 2024-04-14 DIAGNOSIS — H66.92 LEFT OTITIS MEDIA, UNSPECIFIED OTITIS MEDIA TYPE: Primary | ICD-10-CM

## 2024-04-14 PROCEDURE — 99212 OFFICE O/P EST SF 10 MIN: CPT | Performed by: PHYSICIAN ASSISTANT

## 2024-04-14 RX ORDER — AMOXICILLIN 875 MG/1
875 TABLET, COATED ORAL 2 TIMES DAILY
Qty: 14 TABLET | Refills: 0 | Status: SHIPPED | OUTPATIENT
Start: 2024-04-14 | End: 2024-04-21

## 2024-04-14 NOTE — PROGRESS NOTES
[facility]        NAME: Sweta Campbell is a 48 y.o. female  : 1975    MRN: [id number]  DATE: 2024  TIME: 8:54 AM    Assessment and Plan   Left otitis media, unspecified otitis media type [H66.92]  1. Left otitis media, unspecified otitis media type  amoxicillin (AMOXIL) 875 mg tablet        Patient does not need a work note.    Patient Instructions     Patient Instructions   Ear Infection   WHAT YOU NEED TO KNOW:   An ear infection is also called otitis media. Blocked or swollen eustachian tubes can cause an infection. Eustachian tubes connect the middle ear to the back of the nose and throat. They drain fluid from the middle ear. You may have a buildup of fluid in your ear. Germs build up in the fluid and infection develops.       DISCHARGE INSTRUCTIONS:   Return to the emergency department if:   You have clear fluid coming from your ear.    You have a stiff neck, headache, and a fever.    Call your doctor if:   You see blood or pus draining from your ear.    Your ear pain gets worse or does not go away, even after treatment.    The outside of your ear is red or swollen.    You are vomiting or have diarrhea.    You have questions or concerns about your condition or care.    Medicines:  You may  need any of the following:  Acetaminophen  decreases pain and fever. It is available without a doctor's order. Ask how much to take and how often to take it. Follow directions. Read the labels of all other medicines you are using to see if they also contain acetaminophen, or ask your doctor or pharmacist. Acetaminophen can cause liver damage if not taken correctly.    NSAIDs , such as ibuprofen, help decrease swelling, pain, and fever. This medicine is available with or without a doctor's order. NSAIDs can cause stomach bleeding or kidney problems in certain people. If you take blood thinner medicine, always ask your healthcare provider if NSAIDs are safe for you. Always read the medicine  label and follow directions.    Ear drops  may contain medicine to decrease pain and inflammation.    Antibiotics  help treat a bacterial infection.    Take your medicine as directed.  Contact your healthcare provider if you think your medicine is not helping or if you have side effects. Tell your provider if you are allergic to any medicine. Keep a list of the medicines, vitamins, and herbs you take. Include the amounts, and when and why you take them. Bring the list or the pill bottles to follow-up visits. Carry your medicine list with you in case of an emergency.    Self-care:   Apply heat  on your ear for 15 to 20 minutes, 3 to 4 times a day or as directed. You can apply heat with an electric heating pad, hot water bottle, or warm compress. Always put a cloth between your skin and the heat pack to prevent burns. Heat helps decrease pain.    Apply ice  on your ear for 15 to 20 minutes, 3 to 4 times a day for 2 days or as directed. Use an ice pack, or put crushed ice in a plastic bag. Cover it with a towel before you apply it to your ear. Ice decreases swelling and pain.    Prevent an ear infection:   Wash your hands often  to help prevent the spread of germs. Ask everyone in your house to wash their hands with soap and water. Ask them to wash after they use the bathroom or change a diaper. Remind them to wash before they prepare or eat food.         Stay away from people who are ill.  Some germs spread easily and quickly through contact.    Follow up with your doctor as directed:  Write down your questions so you remember to ask them during your visits.  © Copyright [employer] 2023 Information is for End User's use only and may not be sold, redistributed or otherwise used for commercial purposes.  The above information is an  only. It is not intended as medical advice for individual conditions or treatments. Talk to your doctor, nurse or pharmacist before following any medical regimen to see if it is  safe and effective for you.        Follow up with PCP in 3-5 days.  Proceed to  ER if symptoms worsen.    Chief Complaint     Chief Complaint   Patient presents with    Cold Like Symptoms     Pt presents with fever, head congestion, ears/throat irritation, started on Tuesday; left ear pain started yesterday          History of Present Illness       The patient is a 48-year-old female presenting today for left ear pain x 1 day.  Reports that starting 6 days ago she developed a fever, head congestion, ear pain and a sore throat.  Is presenting today for the ear pain.  Admits that she was starting to feel better and now feels like she got hit by a truck.  Admits that the sore throat is mostly from postnasal drip and that she is coughing only when blowing her nose.        Review of Systems   Review of Systems   Constitutional:  Positive for fever (resolved). Negative for activity change, appetite change, chills and fatigue.   HENT:  Positive for congestion, ear pain, postnasal drip and rhinorrhea. Negative for sinus pressure, sinus pain and sore throat.    Eyes:  Negative for pain, discharge and visual disturbance.   Respiratory:  Positive for cough. Negative for chest tightness and shortness of breath.    Cardiovascular:  Negative for chest pain and palpitations.   Gastrointestinal:  Negative for abdominal pain, diarrhea, nausea and vomiting.   Genitourinary:  Negative for dysuria and hematuria.   Musculoskeletal:  Negative for arthralgias, back pain and myalgias.   Skin:  Negative for color change, pallor and rash.   Neurological:  Negative for seizures, syncope and headaches.   All other systems reviewed and are negative.        Current Medications       Current Outpatient Medications:     albuterol (Proventil HFA) 90 mcg/act inhaler, Inhale 2 puffs 4 (four) times a day as needed for wheezing, Disp: 1 g, Rfl: 0    amoxicillin (AMOXIL) 875 mg tablet, Take 1 tablet (875 mg total) by mouth 2 (two) times a day for 7  days, Disp: 14 tablet, Rfl: 0    busPIRone (BUSPAR) 5 mg tablet, Take 1 tablet (5 mg total) by mouth 2 (two) times a day, Disp: 60 tablet, Rfl: 0    cholecalciferol (VITAMIN D3) 25 mcg (1,000 units) tablet, , Disp: , Rfl:     fluconazole (DIFLUCAN) 150 mg tablet, if needed, Disp: , Rfl:     fluticasone (FLONASE) 50 mcg/act nasal spray, 1 spray into each nostril daily, Disp: 16 g, Rfl: 0    lamoTRIgine (LaMICtal) 25 mg tablet, Take 25 mg by mouth, Disp: , Rfl:     levothyroxine (Euthyrox) 175 mcg tablet, Take 1 tablet (175 mcg total) by mouth daily in the early morning, Disp: 90 tablet, Rfl: 3    liothyronine (CYTOMEL) 5 mcg tablet, Take 1 tablet (5 mcg total) by mouth daily, Disp: 90 tablet, Rfl: 1    lisinopril (ZESTRIL) 5 mg tablet, Take 1 tablet (5 mg total) by mouth daily, Disp: 90 tablet, Rfl: 1    LORazepam (ATIVAN) 1 mg tablet, Take 1 tablet (1 mg total) by mouth daily as needed for anxiety, Disp: 30 tablet, Rfl: 0    montelukast (SINGULAIR) 10 mg tablet, Take 1 tablet (10 mg total) by mouth daily, Disp: 90 tablet, Rfl: 1    Multiple Vitamins-Minerals (Multi For Her 50+) CAPS, Take by mouth, Disp: , Rfl:     omeprazole (PriLOSEC) 40 MG capsule, Take 1 capsule (40 mg total) by mouth daily, Disp: 30 capsule, Rfl: 2    traZODone (DESYREL) 100 mg tablet, Take 1 tablet (100 mg total) by mouth daily, Disp: 30 tablet, Rfl: 0    vitamin B-12 (VITAMIN B-12) 1,000 mcg tablet, Take by mouth daily, Disp: , Rfl:     Current Allergies     Allergies as of 04/14/2024    (No Known Allergies)            The following portions of the patient's history were reviewed and updated as appropriate: allergies, current medications, past family history, past medical history, past social history, past surgical history and problem list.     Past Medical History:   Diagnosis Date    Allergic     Anxiety     Asthma     Cardiomyopathy (HCC)     bradycardia normal 50-55 bpm    Depression     Disease of thyroid gland     Interstitial cystitis         Past Surgical History:   Procedure Laterality Date    BARIATRIC SURGERY      x2 surgeries lap band and bypass    BREAST BIOPSY Left     usg bx negative     SECTION      x2    EAR RECONSTRUCTION Left     age 6 skin drum skin graft    HYSTERECTOMY         Family History   Problem Relation Age of Onset    Cancer Mother     Cervical cancer Mother     Arthritis Mother     Asthma Father     Arthritis Father     Breast cancer Cousin         age unknown         Medications have been verified.        Objective   Pulse 62   Temp 97.5 °F (36.4 °C)   Resp 14   Wt 77.1 kg (170 lb)   SpO2 98%   BMI 30.11 kg/m²        Physical Exam     Physical Exam  Vitals reviewed.   Constitutional:       General: She is not in acute distress.     Appearance: Normal appearance. She is well-developed and normal weight. She is not ill-appearing, toxic-appearing or diaphoretic.   HENT:      Head: Normocephalic and atraumatic.      Right Ear: Tympanic membrane, ear canal and external ear normal. No drainage, swelling or tenderness. No middle ear effusion. There is no impacted cerumen. Tympanic membrane is not erythematous.      Left Ear: Ear canal and external ear normal. No drainage, swelling or tenderness.  No middle ear effusion. There is no impacted cerumen. Tympanic membrane is not erythematous.      Ears:      Comments: Injection of left TM.     Nose: Congestion present. No rhinorrhea.      Mouth/Throat:      Mouth: Mucous membranes are moist. No oral lesions.      Pharynx: Oropharynx is clear. Uvula midline. No pharyngeal swelling, oropharyngeal exudate, posterior oropharyngeal erythema or uvula swelling.      Tonsils: No tonsillar exudate or tonsillar abscesses. 0 on the right. 0 on the left.   Eyes:      Extraocular Movements:      Right eye: Normal extraocular motion.      Left eye: Normal extraocular motion.      Conjunctiva/sclera: Conjunctivae normal.      Pupils: Pupils are equal, round, and reactive to light.    Cardiovascular:      Rate and Rhythm: Normal rate and regular rhythm.      Heart sounds: Normal heart sounds. No murmur heard.     No friction rub. No gallop.   Pulmonary:      Effort: Pulmonary effort is normal. No respiratory distress.      Breath sounds: Normal breath sounds. No stridor. No wheezing, rhonchi or rales.   Chest:      Chest wall: No tenderness.   Skin:     General: Skin is warm and dry.      Capillary Refill: Capillary refill takes less than 2 seconds.   Neurological:      Mental Status: She is alert.

## 2024-04-19 DIAGNOSIS — F41.9 ANXIETY: ICD-10-CM

## 2024-04-19 RX ORDER — LAMOTRIGINE 25 MG/1
25 TABLET ORAL DAILY
Qty: 30 TABLET | Refills: 0 | Status: SHIPPED | OUTPATIENT
Start: 2024-04-19 | End: 2024-07-16

## 2024-04-19 RX ORDER — TRAZODONE HYDROCHLORIDE 100 MG/1
100 TABLET ORAL DAILY
Qty: 30 TABLET | Refills: 0 | Status: SHIPPED | OUTPATIENT
Start: 2024-04-19 | End: 2024-05-19

## 2024-04-19 RX ORDER — BUSPIRONE HYDROCHLORIDE 5 MG/1
5 TABLET ORAL 2 TIMES DAILY
Qty: 60 TABLET | Refills: 0 | Status: SHIPPED | OUTPATIENT
Start: 2024-04-19

## 2024-04-22 ENCOUNTER — TELEPHONE (OUTPATIENT)
Age: 49
End: 2024-04-22

## 2024-04-22 NOTE — TELEPHONE ENCOUNTER
New patient calling to schedule appointment with ENT. Seen and treated at Urgent Care on 4/14/24 for a bad ear infection. Given Amoxicillin and Flonase. She states her ears are still closed and she cannot hear. Has history of ear infections as a child with tubes placed. If symptoms worsen until 4/29/24 appointment, she will go to ER for evaluation/treatment.

## 2024-05-01 ENCOUNTER — OFFICE VISIT (OUTPATIENT)
Dept: OTOLARYNGOLOGY | Facility: CLINIC | Age: 49
End: 2024-05-01
Payer: COMMERCIAL

## 2024-05-01 VITALS — BODY MASS INDEX: 30.12 KG/M2 | WEIGHT: 170 LBS | HEIGHT: 63 IN | TEMPERATURE: 97.6 F

## 2024-05-01 DIAGNOSIS — Z96.22 HISTORY OF TYMPANOSTOMY TUBE PLACEMENT: ICD-10-CM

## 2024-05-01 DIAGNOSIS — H65.02 NON-RECURRENT ACUTE SEROUS OTITIS MEDIA OF LEFT EAR: Primary | ICD-10-CM

## 2024-05-01 PROCEDURE — 99204 OFFICE O/P NEW MOD 45 MIN: CPT | Performed by: NURSE PRACTITIONER

## 2024-05-01 RX ORDER — PREDNISONE 10 MG/1
TABLET ORAL
Qty: 30 TABLET | Refills: 0 | Status: SHIPPED | OUTPATIENT
Start: 2024-05-01

## 2024-05-01 RX ORDER — AZELASTINE 1 MG/ML
1 SPRAY, METERED NASAL 2 TIMES DAILY
Qty: 1 ML | Refills: 2 | Status: SHIPPED | OUTPATIENT
Start: 2024-05-01

## 2024-05-01 NOTE — PROGRESS NOTES
Assessment/Plan:    Non-recurrent acute serous otitis media of left ear  Symptoms include left ear blocked for past 3 weeks s/p URI. History of multiple pe tubes and tympanoplasty as a child.   Previous treatment includes oral antibiotics and intermittent use of Flonase.   On exam, Visible serous fluid, left TM with erythema of the left TM, right Tm retraction, monomer and tympanosclerosis.  Reviewed risks hearing change due to serous fluid. Discussed SNHL vs conductive hearing loss.   Recommend audiogram today but pt declined due to high deductible costs.   Discussed may take up to 3 months for otitis media in adults to resolve on its own.   Reviewed options for otitis media in adult including resume nasal steroids, oral steroids, decongestants, vs in office pe tube.  Briefly discussed actual procedure of pe tube in office setting if needed. No proof medications can hasten the drainage of serous fluid.     After discussion agree to  Fluticasone nasal spray one spray each nostril twice per day  Astelin nasal spray one spray each nostril twice per day  Prednisone taper    Follow up in 4 to 6 weeks with possible audiogram, pt aware of cost         Diagnoses and all orders for this visit:    Non-recurrent acute serous otitis media of left ear  -     predniSONE 10 mg tablet; 4 tabs for 3 days then 3 tabs for 3 days then 2 tabs for 3 days then one tab for 3 days  -     azelastine (ASTELIN) 0.1 % nasal spray; 1 spray into each nostril 2 (two) times a day Use in each nostril as directed    History of tympanostomy tube placement          Subjective:      Patient ID: Sweta Campbell is a 48 y.o. female.    Presents today as a new patient due to ear concerns.  Symptoms began with URI about 2 to 3 weeks ago. Decreased hearing left ear. Left tinnitus.  Pressure and left Otalgia.  No otorrhea.  History of ear surgery.  Multiple sets of ear tubes as child. At age 6 ot 7 had graft to ear drum. Current over the counter hearing  "aids.    Treated in urgent care about two weeks ago for left ear infection. Treated with oral antibiotics and nasal steroids.           The following portions of the patient's history were reviewed and updated as appropriate: allergies, current medications, past family history, past medical history, past social history, past surgical history, and problem list.    Review of Systems   Constitutional: Negative.    HENT:  Positive for ear pain and hearing loss. Negative for congestion, ear discharge, nosebleeds, postnasal drip, rhinorrhea, sinus pressure, sinus pain, sore throat, tinnitus and voice change.    Respiratory:  Negative for chest tightness and shortness of breath.    Skin:  Negative for color change.   Neurological:  Negative for dizziness, numbness and headaches.   Psychiatric/Behavioral: Negative.           Objective:      Temp 97.6 °F (36.4 °C) (Temporal)   Ht 5' 3\" (1.6 m)   Wt 77.1 kg (170 lb)   BMI 30.11 kg/m²          Physical Exam  Constitutional:       Appearance: She is well-developed.   HENT:      Head: Normocephalic.      Right Ear: Hearing, ear canal and external ear normal. No decreased hearing noted. No drainage or tenderness. Tympanic membrane is scarred, erythematous and retracted. Tympanic membrane is not perforated.      Left Ear: Hearing, ear canal and external ear normal. No decreased hearing noted. No drainage or tenderness. Tympanic membrane is scarred and erythematous. Tympanic membrane is not perforated.      Ears:      Comments: Left ear serous fluid      Nose: Nose normal. No nasal deformity or septal deviation.      Mouth/Throat:      Mouth: Mucous membranes are not pale and not dry. No oral lesions.      Dentition: Normal dentition.      Pharynx: Uvula midline. No oropharyngeal exudate.   Neck:      Trachea: No tracheal deviation.   Pulmonary:      Effort: Pulmonary effort is normal. No accessory muscle usage or respiratory distress.   Musculoskeletal:      Cervical back: Full " passive range of motion without pain and neck supple.   Lymphadenopathy:      Cervical: No cervical adenopathy.   Skin:     General: Skin is warm and dry.   Neurological:      Mental Status: She is alert and oriented to person, place, and time.      Cranial Nerves: No cranial nerve deficit.      Sensory: No sensory deficit.   Psychiatric:         Behavior: Behavior is cooperative.

## 2024-05-01 NOTE — PATIENT INSTRUCTIONS
Fluticasone nasal spray one spray each nostril twice per day  Astelin spray prescribed  Prednisone tapered dose    Approx cost self pay audio test is 300 to 400

## 2024-05-01 NOTE — ASSESSMENT & PLAN NOTE
Symptoms include left ear blocked for past 3 weeks s/p URI. History of multiple pe tubes and tympanoplasty as a child.   Previous treatment includes oral antibiotics and intermittent use of Flonase.   On exam, Visible serous fluid, left TM with erythema of the left TM, right Tm retraction, monomer and tympanosclerosis.  Reviewed risks hearing change due to serous fluid. Discussed SNHL vs conductive hearing loss.   Recommend audiogram today but pt declined due to high deductible costs.   Discussed may take up to 3 months for otitis media in adults to resolve on its own.   Reviewed options for otitis media in adult including resume nasal steroids, oral steroids, decongestants, vs in office pe tube.  Briefly discussed actual procedure of pe tube in office setting if needed. No proof medications can hasten the drainage of serous fluid.     After discussion agree to  Fluticasone nasal spray one spray each nostril twice per day  Astelin nasal spray one spray each nostril twice per day  Prednisone taper    Follow up in 4 to 6 weeks with possible audiogram, pt aware of cost

## 2024-05-03 ENCOUNTER — SOCIAL WORK (OUTPATIENT)
Dept: BEHAVIORAL/MENTAL HEALTH CLINIC | Facility: CLINIC | Age: 49
End: 2024-05-03

## 2024-05-03 DIAGNOSIS — F41.1 GENERALIZED ANXIETY DISORDER: ICD-10-CM

## 2024-05-03 DIAGNOSIS — F33.1 MAJOR DEPRESSIVE DISORDER, RECURRENT, MODERATE (HCC): ICD-10-CM

## 2024-05-03 DIAGNOSIS — F43.10 PTSD (POST-TRAUMATIC STRESS DISORDER): Primary | ICD-10-CM
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
## 2024-05-17 ENCOUNTER — SOCIAL WORK (OUTPATIENT)
Dept: BEHAVIORAL/MENTAL HEALTH CLINIC | Facility: CLINIC | Age: 49
End: 2024-05-17
Payer: COMMERCIAL

## 2024-05-17 ENCOUNTER — TELEPHONE (OUTPATIENT)
Age: 49
End: 2024-05-17

## 2024-05-17 DIAGNOSIS — F41.1 GENERALIZED ANXIETY DISORDER: ICD-10-CM

## 2024-05-17 DIAGNOSIS — F43.10 PTSD (POST-TRAUMATIC STRESS DISORDER): ICD-10-CM

## 2024-05-17 DIAGNOSIS — F33.1 MAJOR DEPRESSIVE DISORDER, RECURRENT, MODERATE (HCC): Primary | ICD-10-CM

## 2024-05-17 PROCEDURE — 90834 PSYTX W PT 45 MINUTES: CPT | Performed by: SOCIAL WORKER

## 2024-05-17 NOTE — TELEPHONE ENCOUNTER
Patient called regarding her appointment on Monday 5/20/24. She would like to speak with the MA. Made a warm transfer of the call to Janet BETH in the [facility] office.
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
## 2024-05-17 NOTE — BH CRISIS PLAN
Client Name: Sweta Campbell       Client YOB: 1975    SathyaYann Safety Plan      Creation Date: 5/17/24    Created By: Nayely Nuñez       Step 1: Warning Signs:   Warning Signs   increased sleep   lack of motivation   trouble focusing from anxiety   work flow decreases            Step 2: Internal Coping Strategies:   Internal Coping Strategies   breathing   taking showers            Step 3: People and social settings that provide distraction:   Name Contact Information   Mee in phone   Frida in phone   Johnna in phone            Step 4: People whom I can ask for help during a crisis:      Name Contact Information    Frida in phone    Sister Janiya Rodriguez in phone      Step 5: Professionals or agencies I can contact during a crisis:      Clinican/Agency Name Phone Emergency Contact    [facility] [phone] Nayely Nuñez LCSW      [facility] Emergency Department Emergency Department Phone Emergency Department Address    [facility] [phone]        Crisis Phone Numbers:   Suicide Prevention Lifeline: Call or Text  151 Crisis Text Line: Text HOME to 729-867   Please note: Some [facility] do not have a separate number for Child/Adolescent specific crisis. If your county is not listed under Child/Adolescent, please call the adult number for your county      Adult Crisis Numbers: Child/Adolescent Crisis Numbers   [facility]: [phone] [facility]: [phone]   [facility]: [phone] [facility]: [phone]   [facility]: [phone] [place]: [phone]   [facility]: [phone] [place]: [phone]   [facility]: [phone]   [facility]: [phone]   [facility]: [phone]   [facility]: [phone] (daytime) [phone] (after hours, weekends, holidays)      Step 6: Making the environment safer (plan for lethal means safety):   Patient did not identify any  lethal methods: Yes     Optional: What is most important to me and worth living for?      [place] Safety Plan. Brittney Mcdonnell and Ramses Lerner. Used with permission of the authors.

## 2024-05-18 LAB
ALBUMIN SERPL-MCNC: 4.2 G/DL (ref 3.9–4.9)
ALBUMIN/GLOB SERPL: 1.6 {RATIO} (ref 1.2–2.2)
ALP SERPL-CCNC: 96 IU/L (ref 44–121)
ALT SERPL-CCNC: 30 IU/L (ref 0–32)
AST SERPL-CCNC: 36 IU/L (ref 0–40)
BASOPHILS # BLD AUTO: 0.1 X10E3/UL (ref 0–0.2)
BASOPHILS NFR BLD AUTO: 1 %
BILIRUB SERPL-MCNC: 0.6 MG/DL (ref 0–1.2)
BUN SERPL-MCNC: 14 MG/DL (ref 6–24)
BUN/CREAT SERPL: 15 (ref 9–23)
CALCIUM SERPL-MCNC: 9.5 MG/DL (ref 8.7–10.2)
CHLORIDE SERPL-SCNC: 102 MMOL/L (ref 96–106)
CHOLEST SERPL-MCNC: 170 MG/DL (ref 100–199)
CO2 SERPL-SCNC: 25 MMOL/L (ref 20–29)
CREAT SERPL-MCNC: 0.96 MG/DL (ref 0.57–1)
EGFR: 72 ML/MIN/1.73
EOSINOPHIL # BLD AUTO: 0.1 X10E3/UL (ref 0–0.4)
EOSINOPHIL NFR BLD AUTO: 1 %
ERYTHROCYTE [DISTWIDTH] IN BLOOD BY AUTOMATED COUNT: 14.1 % (ref 11.7–15.4)
ERYTHROCYTE [DISTWIDTH] IN BLOOD BY AUTOMATED COUNT: 14.1 % (ref 11.7–15.4)
FOLATE SERPL-MCNC: 15.7 NG/ML
GLOBULIN SER-MCNC: 2.6 G/DL (ref 1.5–4.5)
GLUCOSE SERPL-MCNC: 77 MG/DL (ref 70–99)
HCT VFR BLD AUTO: 35.9 % (ref 34–46.6)
HCT VFR BLD AUTO: 36.3 % (ref 34–46.6)
HDLC SERPL-MCNC: 78 MG/DL
HGB BLD-MCNC: 11.7 G/DL (ref 11.1–15.9)
HGB BLD-MCNC: 11.7 G/DL (ref 11.1–15.9)
IMM GRANULOCYTES # BLD: 0 X10E3/UL (ref 0–0.1)
IMM GRANULOCYTES NFR BLD: 0 %
LDLC SERPL CALC-MCNC: 77 MG/DL (ref 0–99)
LDLC/HDLC SERPL: 1 RATIO (ref 0–3.2)
LYMPHOCYTES # BLD AUTO: 2.2 X10E3/UL (ref 0.7–3.1)
LYMPHOCYTES NFR BLD AUTO: 27 %
MCH RBC QN AUTO: 27.1 PG (ref 26.6–33)
MCH RBC QN AUTO: 27.4 PG (ref 26.6–33)
MCHC RBC AUTO-ENTMCNC: 32.2 G/DL (ref 31.5–35.7)
MCHC RBC AUTO-ENTMCNC: 32.6 G/DL (ref 31.5–35.7)
MCV RBC AUTO: 84 FL (ref 79–97)
MCV RBC AUTO: 84 FL (ref 79–97)
MICRODELETION SYND BLD/T FISH: NORMAL
MONOCYTES # BLD AUTO: 0.9 X10E3/UL (ref 0.1–0.9)
MONOCYTES NFR BLD AUTO: 11 %
NEUTROPHILS # BLD AUTO: 4.9 X10E3/UL (ref 1.4–7)
NEUTROPHILS NFR BLD AUTO: 60 %
PLATELET # BLD AUTO: 212 X10E3/UL (ref 150–450)
PLATELET # BLD AUTO: 222 X10E3/UL (ref 150–450)
POTASSIUM SERPL-SCNC: 4.2 MMOL/L (ref 3.5–5.2)
PROT SERPL-MCNC: 6.8 G/DL (ref 6–8.5)
RBC # BLD AUTO: 4.27 X10E6/UL (ref 3.77–5.28)
RBC # BLD AUTO: 4.31 X10E6/UL (ref 3.77–5.28)
SL AMB VLDL CHOLESTEROL CALC: 15 MG/DL (ref 5–40)
SODIUM SERPL-SCNC: 139 MMOL/L (ref 134–144)
T4 FREE SERPL-MCNC: 1.34 NG/DL (ref 0.82–1.77)
TRIGL SERPL-MCNC: 84 MG/DL (ref 0–149)
TSH SERPL DL<=0.005 MIU/L-ACNC: 0.08 UIU/ML (ref 0.45–4.5)
TSH SERPL DL<=0.005 MIU/L-ACNC: 0.1 UIU/ML (ref 0.45–4.5)
VIT B12 SERPL-MCNC: 770 PG/ML (ref 232–1245)
WBC # BLD AUTO: 8.2 X10E3/UL (ref 3.4–10.8)
WBC # BLD AUTO: 8.3 X10E3/UL (ref 3.4–10.8)

## 2024-05-21 DIAGNOSIS — T78.40XA ALLERGY, INITIAL ENCOUNTER: ICD-10-CM

## 2024-05-21 DIAGNOSIS — E03.9 HYPOTHYROIDISM, UNSPECIFIED TYPE: ICD-10-CM

## 2024-05-21 RX ORDER — MONTELUKAST SODIUM 10 MG/1
10 TABLET ORAL DAILY
Qty: 90 TABLET | Refills: 1 | Status: SHIPPED | OUTPATIENT
Start: 2024-05-21

## 2024-05-21 RX ORDER — LEVOTHYROXINE SODIUM 0.15 MG/1
150 TABLET ORAL
Qty: 90 TABLET | Refills: 1 | Status: SHIPPED | OUTPATIENT
Start: 2024-05-21

## 2024-05-24 DIAGNOSIS — F41.9 ANXIETY: ICD-10-CM

## 2024-05-25 RX ORDER — TRAZODONE HYDROCHLORIDE 100 MG/1
100 TABLET ORAL DAILY
Qty: 30 TABLET | Refills: 0 | Status: SHIPPED | OUTPATIENT
Start: 2024-05-25 | End: 2024-06-24

## 2024-05-28 ENCOUNTER — TELEPHONE (OUTPATIENT)
Dept: PSYCHIATRY | Facility: CLINIC | Age: 49
End: 2024-05-28

## 2024-05-28 RX ORDER — LORAZEPAM 1 MG/1
1 TABLET ORAL DAILY PRN
Qty: 30 TABLET | Refills: 0 | Status: SHIPPED | OUTPATIENT
Start: 2024-05-28

## 2024-05-28 NOTE — TELEPHONE ENCOUNTER
Pt call and ask if you are going to be in the office this Friday, she state that you told her to call to see if you will be in the office or working from home. Pt would like a call back.

## 2024-05-31 ENCOUNTER — TELEMEDICINE (OUTPATIENT)
Dept: BEHAVIORAL/MENTAL HEALTH CLINIC | Facility: CLINIC | Age: 49
End: 2024-05-31
Payer: COMMERCIAL

## 2024-05-31 DIAGNOSIS — F33.1 MAJOR DEPRESSIVE DISORDER, RECURRENT, MODERATE (HCC): Primary | ICD-10-CM

## 2024-05-31 DIAGNOSIS — F41.1 GENERALIZED ANXIETY DISORDER: ICD-10-CM

## 2024-05-31 DIAGNOSIS — F43.10 PTSD (POST-TRAUMATIC STRESS DISORDER): ICD-10-CM

## 2024-05-31 PROBLEM — H65.02 NON-RECURRENT ACUTE SEROUS OTITIS MEDIA OF LEFT EAR: Status: RESOLVED | Noted: 2024-05-01 | Resolved: 2024-05-31

## 2024-05-31 PROCEDURE — 90834 PSYTX W PT 45 MINUTES: CPT | Performed by: SOCIAL WORKER

## 2024-05-31 NOTE — PSYCH
This note was not shared with the patient due to this is a psychotherapy note    Virtual Regular Visit    Verification of patient location:    Patient is located at Home in the following state in which I hold an active license [place]      Assessment/Plan:    Problem List Items Addressed This Visit       Generalized anxiety disorder    PTSD (post-traumatic stress disorder)    Major depressive disorder, recurrent, moderate (HCC) - Primary       Goals addressed in session: Goal 1 and Goal 2          Reason for visit is   Chief Complaint   Patient presents with    Virtual Regular Visit          Encounter provider Nayely Nuñez      Recent Visits  Date Type Provider Dept   05/28/24 Telephone Nayely Nuñez [facility]   Showing recent visits within past 7 days and meeting all other requirements  Today's Visits  Date Type Provider Dept   05/31/24 Telemedicine [facility]ely Nu[facility]ez  [facility]   Showing today's visits and meeting all other requirements  Future Appointments  No visits were found meeting these conditions.  Showing future appointments within next 150 days and meeting all other requirements       The patient was identified by name and date of birth. Sweta BENJAMIN [place] was informed that this is a telemedicine visit and that the visit is being conducted throughthe [employer] platform. She agrees to proceed..  My office door was closed. No one else was in the room.  She acknowledged consent and understanding of privacy and security of the video platform. The patient has agreed to participate and understands they can discontinue the visit at any time.    Patient is aware this is a billable service.     Behavioral Health Psychotherapy Progress Note    Psychotherapy Provided: Individual Psychotherapy     1. Major depressive disorder, recurrent, moderate (HCC)        2. Generalized anxiety disorder        3. PTSD (post-traumatic stress disorder)            Goals  "addressed in session: Goal 1 and Goal 2     DATA: Writer met with Sweta, virtually, for an individual psyhotherapy session. Sweta reported that the past two weeks have been full of stress. Sweta shared that she had her nieces young children over the weekend because her sister was in a bad place and unable to care for them. Sweta went on to share about her sister's alcohol addiction and poor mental health. Sweta shared about their trauma bond and codependent relationship, noting how exhausting it is. Sweta reported that she tries to set boundaries but struggles as part of her feels that she has to be the one to take care of her sister because her sister helped to raise  her. Sweta went on to discuss stress related to her son, who is 20, and having some trouble with school. Sweta shared that she contacted the school and \"went off.\" Writer actively listened as Sweta shared and spent time exploring and processing thoughts and feelings. Writer offered emotional validation, and also thought challenging to address unhelpful thinking patterns. Writer and Sweta discussed enabling behavior and the challenges of setting boundaries. Writer reinforced the importance of self-care.     During this session, this clinician used the following therapeutic modalities: Cognitive Behavioral Therapy and Supportive Psychotherapy    Substance Abuse was not addressed during this session. If the client is diagnosed with a co-occurring substance use disorder, please indicate any changes in the frequency or amount of use: NA. Stage of change for addressing substance use diagnoses: No substance use/Not applicable    ASSESSMENT:  Sweta Campbell presents with a Anxious and Depressed mood.     her affect is Normal range and intensity, which is congruent, with her mood and the content of the session. The client has made progress on their goals.    Sweta was oriented X3 and well engaged. Eye contact was good, " "speech was of normal rate and tone. Thought content was normal; insight and judgement were intact. Sweta Campbell presents with a none risk of suicide, none risk of self-harm, and none risk of harm to others.    For any risk assessment that surpasses a \"low\" rating, a safety plan must be developed.    A safety plan was indicated: no  If yes, describe in detail NA    PLAN: Between sessions, Sweta Campbell will spend at least 10 minutes out in nature without distractions for self-care. At the next session, the therapist will use Cognitive Behavioral Therapy to address stress/depression/anxiety.    Behavioral Health Treatment Plan and Discharge Planning: Sweta Campbell is aware of and agrees to continue to work on their treatment plan. They have identified and are working toward their discharge goals. yes    Visit start and stop times:    05/31/24  Start Time: 1000  Stop Time: 1048  Total Visit Time: 48 minutes  "

## 2024-06-06 ENCOUNTER — OFFICE VISIT (OUTPATIENT)
Dept: PSYCHIATRY | Facility: CLINIC | Age: 49
End: 2024-06-06
Payer: COMMERCIAL

## 2024-06-06 DIAGNOSIS — F41.9 ANXIETY: ICD-10-CM

## 2024-06-06 PROCEDURE — 90792 PSYCH DIAG EVAL W/MED SRVCS: CPT | Performed by: PHYSICIAN ASSISTANT

## 2024-06-06 RX ORDER — BUSPIRONE HYDROCHLORIDE 5 MG/1
5 TABLET ORAL 2 TIMES DAILY
Qty: 180 TABLET | Refills: 1 | Status: SHIPPED | OUTPATIENT
Start: 2024-06-06

## 2024-06-06 RX ORDER — LAMOTRIGINE 25 MG/1
50 TABLET ORAL DAILY
Qty: 60 TABLET | Refills: 1 | Status: SHIPPED | OUTPATIENT
Start: 2024-06-06

## 2024-06-07 ENCOUNTER — TELEPHONE (OUTPATIENT)
Age: 49
End: 2024-06-07

## 2024-06-07 DIAGNOSIS — E03.9 HYPOTHYROIDISM, UNSPECIFIED TYPE: ICD-10-CM

## 2024-06-07 DIAGNOSIS — I10 ESSENTIAL HYPERTENSION: ICD-10-CM

## 2024-06-07 RX ORDER — LISINOPRIL 5 MG/1
5 TABLET ORAL DAILY
Qty: 90 TABLET | Refills: 1 | Status: SHIPPED | OUTPATIENT
Start: 2024-06-07

## 2024-06-07 RX ORDER — LIOTHYRONINE SODIUM 5 UG/1
5 TABLET ORAL DAILY
Qty: 90 TABLET | Refills: 1 | Status: SHIPPED | OUTPATIENT
Start: 2024-06-07
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
## 2024-06-07 NOTE — TELEPHONE ENCOUNTER
Patient is a new patient.  Scheduled an annual appointment for patient to be evaluated and establish care.  Patient verbalized understanding and voiced appreciation for phone call.

## 2024-06-12 ENCOUNTER — SOCIAL WORK (OUTPATIENT)
Dept: BEHAVIORAL/MENTAL HEALTH CLINIC | Facility: CLINIC | Age: 49
End: 2024-06-12
Payer: COMMERCIAL

## 2024-06-12 DIAGNOSIS — F41.1 GENERALIZED ANXIETY DISORDER: ICD-10-CM

## 2024-06-12 DIAGNOSIS — F33.1 MAJOR DEPRESSIVE DISORDER, RECURRENT, MODERATE (HCC): Primary | ICD-10-CM

## 2024-06-12 DIAGNOSIS — F43.10 PTSD (POST-TRAUMATIC STRESS DISORDER): ICD-10-CM

## 2024-06-12 PROCEDURE — 90837 PSYTX W PT 60 MINUTES: CPT | Performed by: SOCIAL WORKER

## 2024-06-12 NOTE — PSYCH
"This note was not shared with the patient due to this is a psychotherapy note  *addended to add PHQ9 answers     Behavioral Health Psychotherapy Progress Note    Psychotherapy Provided: Individual Psychotherapy     1. Major depressive disorder, recurrent, moderate (HCC)        2. Generalized anxiety disorder        3. PTSD (post-traumatic stress disorder)            Goals addressed in session: Goal 1 and Goal 2     DATA: Writer met with Sweta for an individual psychotherapy session. Sweta reported that she has been in a \"low\" mood recently. Sweta shared that she has been stressed about various things in life including her sister's well being, her son having a safe vehicle to drive, finances and keeping up with household responsibilities. Sweta shared that she is considering getting a second job to help her son pay for a car. Sweta reported that she did not follow through on practicing mindfulness due to \"lack of motivation.\" Sweta spent time sharing in detail about the various stressors in her life. Writer actively listened and assisted Sweta by exploring and then processing her thoughts and feelings. Writer recognized Sweta engaged in co-dependent relationships, taking on the stress of those around her and failing to prioritize herself. Writer utilized emotional validation, thought challenging and psycho-education throughout session to address anxiety, depression and unhelpful thinking patterns.     During this session, this clinician used the following therapeutic modalities: Cognitive Behavioral Therapy and Supportive Psychotherapy    Substance Abuse was not addressed during this session. If the client is diagnosed with a co-occurring substance use disorder, please indicate any changes in the frequency or amount of use: NA. Stage of change for addressing substance use diagnoses: No substance use/Not applicable    ASSESSMENT:  Sweta Campbell presents with a Anxious and Depressed " Reji Castellon is a 45 mood.     her affect is Tearful, which is congruent, with her mood and the content of the session. The client has made progress on their goals.    Sweta was oriented X3 and well engaged. Eye contact was good, speech was of normal rate and tone. Thought content was normal; insight and judgement were intact. Sweta Campbell presents with a none risk of suicide, none risk of self-harm, and none risk of harm to others.    For any risk assessment that surpasses a \"low\" rating, a safety plan must be developed.    A safety plan was indicated: no  If yes, describe in detail NA    PLAN: Between sessions, Sweta Campbell will attempt to engage in self-care (mindfulness). At the next session, the therapist will use Cognitive Behavioral Therapy and Supportive Psychotherapy to address stress/depression/anxiety.    Behavioral Health Treatment Plan and Discharge Planning: Sweta Campbell is aware of and agrees to continue to work on their treatment plan. They have identified and are working toward their discharge goals. yes    Visit start and stop times:    06/12/24  Start Time: 0900  Stop Time: 0953  Total Visit Time: 53 minutes  "

## 2024-06-21 DIAGNOSIS — F41.9 ANXIETY: ICD-10-CM

## 2024-06-21 RX ORDER — TRAZODONE HYDROCHLORIDE 100 MG/1
100 TABLET ORAL DAILY
Qty: 30 TABLET | Refills: 5 | Status: SHIPPED | OUTPATIENT
Start: 2024-06-21 | End: 2024-12-18

## 2024-06-24 ENCOUNTER — TELEPHONE (OUTPATIENT)
Dept: PSYCHIATRY | Facility: CLINIC | Age: 49
End: 2024-06-24

## 2024-06-24 ENCOUNTER — TELEPHONE (OUTPATIENT)
Age: 49
End: 2024-06-24

## 2024-06-24 NOTE — TELEPHONE ENCOUNTER
Called Sweta back and informed her that changes in dose can take time to adjust.  Informed her that if it does not or if it worsens to call the office for possible medication change.

## 2024-06-24 NOTE — TELEPHONE ENCOUNTER
Returned Sweta's call for further discussion.  LM on her VM with direct nursing line requesting a call back.

## 2024-06-24 NOTE — TELEPHONE ENCOUNTER
Patient is concerned because she has noticed that she has become more irate and angry. She is not sure if it's because there was an increase in her Lamictal. She is also wondering if the fact that she is also taking Trazadone and Buspar because there is a Serotonin warning.

## 2024-06-24 NOTE — TELEPHONE ENCOUNTER
Pt of Saray Renny-  Calling to reschedule appointment that was originally scheduled for this week. Pt states she currently is recovering from URI and wants to give more time to clear up before doing an assessment for tubes and hearing test. Rescheduled per request for next available in 2 months.  Pt would like to know what Saray Lawson meant when she discussed in office about pricing of hearing test without insurance might be cheaper - pt concerned about pricing of the test. Please advise.

## 2024-06-24 NOTE — TELEPHONE ENCOUNTER
Sweta called me back.  Discussed symptoms of Serotonin Syndrome however she denies any of them.  However, she has been feeling a lot more irritable which is making her a little more unfiltered.  States she is staring a part time job soon, has been dealing with helping her son buy a car, and he has also been taken off her health insurance because she makes too much money.  All of this can be contributing to her feeling this way but she also asked if the Lamictal increase can also be contributing and if so, will it improve.  She is not requesting to go back on lower dose and feels she can deal with this if she knows it will pass.  She just wants to be able to tell her family to put up with her while she adjusts. She is requesting provider recommendation.    Will refer to Aldo Topete for review.

## 2024-06-26 ENCOUNTER — OFFICE VISIT (OUTPATIENT)
Dept: URGENT CARE | Facility: CLINIC | Age: 49
End: 2024-06-26
Payer: COMMERCIAL

## 2024-06-26 ENCOUNTER — TELEPHONE (OUTPATIENT)
Dept: PSYCHIATRY | Facility: CLINIC | Age: 49
End: 2024-06-26

## 2024-06-26 VITALS
RESPIRATION RATE: 16 BRPM | OXYGEN SATURATION: 98 % | WEIGHT: 165 LBS | TEMPERATURE: 98 F | SYSTOLIC BLOOD PRESSURE: 152 MMHG | DIASTOLIC BLOOD PRESSURE: 76 MMHG | HEART RATE: 62 BPM | BODY MASS INDEX: 29.23 KG/M2 | HEIGHT: 63 IN

## 2024-06-26 DIAGNOSIS — L23.7 POISON IVY DERMATITIS: Primary | ICD-10-CM

## 2024-06-26 PROCEDURE — 99203 OFFICE O/P NEW LOW 30 MIN: CPT | Performed by: PHYSICIAN ASSISTANT

## 2024-06-26 RX ORDER — PREDNISONE 10 MG/1
TABLET ORAL
Qty: 30 TABLET | Refills: 0 | Status: SHIPPED | OUTPATIENT
Start: 2024-06-26 | End: 2024-07-07

## 2024-06-26 NOTE — PROGRESS NOTES
[facility]        NAME: Sweta Campbell is a 48 y.o. female  : 1975    MRN: [id number]  DATE: 2024  TIME: 8:56 AM    Assessment and Plan   Poison ivy dermatitis [L23.7]  1. Poison ivy dermatitis  predniSONE 10 mg tablet        Advised no NSAIDs while on steroids. Can use antihistamines for itching. Discussed strict return to care precautions as well as red flag symptoms which should prompt immediate ED referral. Pt verbalized understanding and is in agreement with plan.  Please follow up with your primary care provider within the next week. Please remember that your visit today was with an urgent care provider and should not replace follow up with your primary care provider for chronic medical issues or annual physicals.       Patient Instructions       Follow up with PCP in 3-5 days.  Proceed to  ER if symptoms worsen.    If tests are performed, our office will contact you with results only if changes need to made to the care plan discussed with you at the visit. You can review your full results on [facility].    Chief Complaint     Chief Complaint   Patient presents with    Rash     Pt states that she thinks she has poison ivy that started about 5 days ago. Rash is on her arms, legs, neck.          History of Present Illness       Pt is a(n) 48 y.o. yo female pw rash x 5 days.  Pertinent PMH: Yes, cardiomyopathy, asthma, anxiety/depression, thyroid disease  Location: arms, R knee, chest/neck  Quality: Itchy and Spreading  Appearance: Flat, Raised, and Erythematous  Associated symptoms: denies  Any otc meds tried: Yes, hydrocortisone cream, calamine, Tecnu  New soaps/medications/detergents: No  Working/playing outdoors: Yes  Known bug/tick bites: No  History of same: Yes          Review of Systems   Review of Systems   Constitutional:  Negative for chills, diaphoresis and fever.   HENT:  Negative for congestion, rhinorrhea and sore throat.    Eyes:  Negative for discharge and  itching.   Respiratory:  Negative for cough, chest tightness, shortness of breath and wheezing.    Cardiovascular:  Negative for chest pain.   Gastrointestinal:  Negative for diarrhea, nausea and vomiting.   Musculoskeletal:  Negative for myalgias.   Skin:  Positive for rash.   Neurological:  Negative for weakness and numbness.         Current Medications       Current Outpatient Medications:     albuterol (Proventil HFA) 90 mcg/act inhaler, Inhale 2 puffs 4 (four) times a day as needed for wheezing, Disp: 1 g, Rfl: 0    azelastine (ASTELIN) 0.1 % nasal spray, 1 spray into each nostril 2 (two) times a day Use in each nostril as directed, Disp: 1 mL, Rfl: 2    busPIRone (BUSPAR) 5 mg tablet, Take 1 tablet (5 mg total) by mouth 2 (two) times a day, Disp: 180 tablet, Rfl: 1    cholecalciferol (VITAMIN D3) 25 mcg (1,000 units) tablet, , Disp: , Rfl:     fluticasone (FLONASE) 50 mcg/act nasal spray, 1 spray into each nostril daily, Disp: 16 g, Rfl: 0    lamoTRIgine (LaMICtal) 25 mg tablet, Take 2 tablets (50 mg total) by mouth daily, Disp: 60 tablet, Rfl: 1    levothyroxine (Euthyrox) 150 mcg tablet, Take 1 tablet (150 mcg total) by mouth daily in the early morning, Disp: 90 tablet, Rfl: 1    liothyronine (CYTOMEL) 5 mcg tablet, Take 1 tablet (5 mcg total) by mouth daily, Disp: 90 tablet, Rfl: 1    lisinopril (ZESTRIL) 5 mg tablet, Take 1 tablet (5 mg total) by mouth daily, Disp: 90 tablet, Rfl: 1    LORazepam (ATIVAN) 1 mg tablet, Take 1 tablet (1 mg total) by mouth daily as needed for anxiety, Disp: 30 tablet, Rfl: 0    montelukast (SINGULAIR) 10 mg tablet, Take 1 tablet (10 mg total) by mouth daily, Disp: 90 tablet, Rfl: 1    Multiple Vitamins-Minerals (Multi For Her 50+) CAPS, Take by mouth, Disp: , Rfl:     predniSONE 10 mg tablet, Take 4 tablets (40 mg total) by mouth daily for 3 days, THEN 3 tablets (30 mg total) daily for 3 days, THEN 2 tablets (20 mg total) daily for 3 days, THEN 1 tablet (10 mg total) daily for  "3 days., Disp: 30 tablet, Rfl: 0    traZODone (DESYREL) 100 mg tablet, Take 1 tablet (100 mg total) by mouth daily, Disp: 30 tablet, Rfl: 5    vitamin B-12 (VITAMIN B-12) 1,000 mcg tablet, Take by mouth daily, Disp: , Rfl:     fluconazole (DIFLUCAN) 150 mg tablet, if needed (Patient not taking: Reported on 2024), Disp: , Rfl:     omeprazole (PriLOSEC) 40 MG capsule, Take 1 capsule (40 mg total) by mouth daily (Patient not taking: Reported on 2024), Disp: 30 capsule, Rfl: 2    predniSONE 10 mg tablet, 4 tabs for 3 days then 3 tabs for 3 days then 2 tabs for 3 days then one tab for 3 days (Patient not taking: Reported on 2024), Disp: 30 tablet, Rfl: 0    Current Allergies     Allergies as of 2024    (No Known Allergies)            The following portions of the patient's history were reviewed and updated as appropriate: allergies, current medications, past family history, past medical history, past social history, past surgical history and problem list.     Past Medical History:   Diagnosis Date    Allergic     Anxiety     Asthma     Cardiomyopathy (HCC)     bradycardia normal 50-55 bpm    Depression     Disease of thyroid gland     Interstitial cystitis        Past Surgical History:   Procedure Laterality Date    BARIATRIC SURGERY      x2 surgeries lap band and bypass    BREAST BIOPSY Left     usg bx negative     SECTION      x2    EAR RECONSTRUCTION Left     age 6 skin drum skin graft    HYSTERECTOMY         Family History   Problem Relation Age of Onset    Cancer Mother     Cervical cancer Mother     Arthritis Mother     Asthma Father     Arthritis Father     Breast cancer Cousin         age unknown         Medications have been verified.        Objective   /76   Pulse 62   Temp 98 °F (36.7 °C)   Resp 16   Ht 5' 3\" (1.6 m)   Wt 74.8 kg (165 lb)   SpO2 98%   BMI 29.23 kg/m²        Physical Exam     Physical Exam  Vitals and nursing note reviewed.   Constitutional:       " General: She is not in acute distress.     Appearance: Normal appearance. She is not ill-appearing.   HENT:      Head: Normocephalic and atraumatic.      Mouth/Throat:      Mouth: Mucous membranes are moist.      Pharynx: Oropharynx is clear. No oropharyngeal exudate or posterior oropharyngeal erythema.   Cardiovascular:      Rate and Rhythm: Normal rate and regular rhythm.      Heart sounds: Normal heart sounds.   Pulmonary:      Effort: Pulmonary effort is normal. No respiratory distress.      Breath sounds: Normal breath sounds. No wheezing, rhonchi or rales.   Skin:     General: Skin is warm and dry.      Capillary Refill: Capillary refill takes less than 2 seconds.      Findings: Rash (Erythematous maculopapular rash present as described in HPI. No signs of superimposed bacterial infection.) present.   Neurological:      Mental Status: She is alert and oriented to person, place, and time.   Psychiatric:         Behavior: Behavior normal.

## 2024-06-26 NOTE — TELEPHONE ENCOUNTER
Patient is calling regarding cancelling an appointment.    Date/Time: 6/26/2024 @ 9    Reason: Patient exposed to sever pioson ivy going to urgent care---r/s to 6/28 gm    Patient was rescheduled: YES [x] NO []  If yes, when was Patient reschedule for: 6/28 virtual    Patient requesting call back to reschedule: YES [] NO [x]

## 2024-06-28 ENCOUNTER — TELEMEDICINE (OUTPATIENT)
Dept: BEHAVIORAL/MENTAL HEALTH CLINIC | Facility: CLINIC | Age: 49
End: 2024-06-28
Payer: COMMERCIAL

## 2024-06-28 DIAGNOSIS — F33.1 MAJOR DEPRESSIVE DISORDER, RECURRENT, MODERATE (HCC): Primary | ICD-10-CM

## 2024-06-28 DIAGNOSIS — F43.10 PTSD (POST-TRAUMATIC STRESS DISORDER): ICD-10-CM

## 2024-06-28 DIAGNOSIS — F41.1 GENERALIZED ANXIETY DISORDER: ICD-10-CM

## 2024-06-28 PROCEDURE — 90834 PSYTX W PT 45 MINUTES: CPT | Performed by: SOCIAL WORKER

## 2024-06-28 NOTE — PSYCH
This note was not shared with the patient due to this is a psychotherapy note    Virtual Regular Visit    Verification of patient location:    Patient is located at Home in the following state in which I hold an active license [place]      Assessment/Plan:    Problem List Items Addressed This Visit       Generalized anxiety disorder    PTSD (post-traumatic stress disorder)    Major depressive disorder, recurrent, moderate (HCC) - Primary       Goals addressed in session: Goal 1 and Goal 2          Reason for visit is   Chief Complaint   Patient presents with    Virtual Regular Visit          Encounter provider Nayely Ware      Recent Visits  Date Type Provider Dept   06/26/24 Telephone [facility]   Showing recent visits within past 7 days and meeting all other requirements  Today's Visits  Date Type Provider Dept   06/28/24 Telemedicine [facility] Therapist [facility]   Showing today's visits and meeting all other requirements  Future Appointments  No visits were found meeting these conditions.  Showing future appointments within next 150 days and meeting all other requirements       The patient was identified by name and date of birth. Sweta Campbell was informed that this is a telemedicine visit and that the visit is being conducted throughthe [employer] platform. She agrees to proceed..  My office door was closed. No one else was in the room.  She acknowledged consent and understanding of privacy and security of the video platform. The patient has agreed to participate and understands they can discontinue the visit at any time.    Patient is aware this is a billable service.     Behavioral Health Psychotherapy Progress Note    Psychotherapy Provided: Individual Psychotherapy     1. Major depressive disorder, recurrent, moderate (HCC)        2. Generalized anxiety disorder        3. PTSD (post-traumatic stress disorder)            Goals  addressed in session: Goal 1 and Goal 2     DATA: Writer met with Sweta for an individual psychotherapy session. Sweta reported that her anxiety has been heightened this week which she believes may be related to the steroid she is taking for poison ivy. 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 Sweta discussed interviewing for another job which is full time, and considering how she could balance two full time jobs. Sweta reported that she took on a car payment for her son and needs to make the extra money. Sweta then spent time discussing some recent tension in her relationship with her significant other. Sweta shared details of the communication barriers between the two. Sweta shared that she begins to feel vulnerable when these disagreements happen because of abandonment in her past. Writer actively listened as Sweta shared and spent time exploring and processing thoughts and feelings. Writer offered emotional validation, utilized thought challenging and psycho-education throughout session. Writer advised Sweta to be mindful of her decision making and emotions during this time that she is taking the steroid. Writer emphasized healthy coping and self-care.     During this session, this clinician used the following therapeutic modalities: Cognitive Behavioral Therapy and Supportive Psychotherapy    Substance Abuse was not addressed during this session. If the client is diagnosed with a co-occurring substance use disorder, please indicate any changes in the frequency or amount of use: NA. Stage of change for addressing substance use diagnoses: No substance use/Not applicable    ASSESSMENT:  Sweta Campbell presents with a Anxious mood.     her affect is Normal range and intensity and Tearful, which is congruent, with her mood and the content of the session. The client has made progress  "on their goals.    Sweta was oriented X3 and well engaged. Eye contact was good, speech was of normal rate and tone. Thought content was normal; insight and judgement were intact. Sweta Campbell presents with a none risk of suicide, none risk of self-harm, and none risk of harm to others.    For any risk assessment that surpasses a \"low\" rating, a safety plan must be developed.    A safety plan was indicated: no  If yes, describe in detail NA    PLAN: Between sessions, Sweta Campbell will focus on healthy coping and self-care. At the next session, the therapist will use Cognitive Behavioral Therapy and Supportive Psychotherapy to address stress/depression/anxiety.    Behavioral Health Treatment Plan and Discharge Planning: Sweta Campbell is aware of and agrees to continue to work on their treatment plan. They have identified and are working toward their discharge goals. yes    Visit start and stop times:    06/28/24  Start Time: 0905  Stop Time: 0949  Total Visit Time: 44 minutes  "

## 2024-07-12 ENCOUNTER — TELEMEDICINE (OUTPATIENT)
Dept: PSYCHIATRY | Facility: CLINIC | Age: 49
End: 2024-07-12
Payer: COMMERCIAL

## 2024-07-12 DIAGNOSIS — F41.1 GENERALIZED ANXIETY DISORDER: Primary | ICD-10-CM

## 2024-07-12 DIAGNOSIS — F43.10 PTSD (POST-TRAUMATIC STRESS DISORDER): ICD-10-CM

## 2024-07-12 DIAGNOSIS — F33.1 MAJOR DEPRESSIVE DISORDER, RECURRENT, MODERATE (HCC): ICD-10-CM

## 2024-07-12 PROCEDURE — 99214 OFFICE O/P EST MOD 30 MIN: CPT | Performed by: PHYSICIAN ASSISTANT

## 2024-07-12 RX ORDER — LAMOTRIGINE 25 MG/1
75 TABLET ORAL DAILY
Qty: 90 TABLET | Refills: 1 | Status: SHIPPED | OUTPATIENT
Start: 2024-07-12

## 2024-07-12 NOTE — PSYCH
This note was not shared with the patient due to reasonable likelihood of causing patient harm    Virtual Regular Visit    Visit Date: 07/12/24     Verification of patient location:    Patient is located at Home in the following state in which I hold an active license [place]    Problem List Items Addressed This Visit       Generalized anxiety disorder - Primary    Relevant Medications    lamoTRIgine (LaMICtal) 25 mg tablet    PTSD (post-traumatic stress disorder)    Major depressive disorder, recurrent, moderate (HCC)    Relevant Medications    lamoTRIgine (LaMICtal) 25 mg tablet     Reason for visit is   Chief Complaint   Patient presents with    Follow-up    Medication Management    Virtual Regular Visit     Encounter provider Aldo Topete    Provider located at [address]  [address]  [facility] [id number] [phone]    Recent Visits  No visits were found meeting these conditions.  Showing recent visits within past 7 days and meeting all other requirements  Today's Visits  Date Type Provider Dept   07/12/24 Telemedicine Aldo Topete  [facility]   Showing today's visits and meeting all other requirements  Future Appointments  No visits were found meeting these conditions.  Showing future appointments within next 150 days and meeting all other requirements       The patient was identified by name and date of birth. Sweta Campbell was informed that this is a telemedicine visit and that the visit is being conducted throughthe Epic Embedded platform. She agrees to proceed.  My office door was closed. No one else was in the room. She acknowledged consent and understanding of privacy and security of the video platform. The patient has agreed to participate and understands they can discontinue the visit at any time.    Patient is aware this is a billable service.     Insurance: Payor: [employer] [place] /  Plan: [facility] [place] OMNIA / Product Type: Blue Fee for Service /      Subjective:    Sweta Campbell is a alina 48 y.o. female with a history of Major Depressive Disorder, Generalized Anxiety Disorder, and PTSD who presents today for follow-up and medication management. Since her last visit she increased the lamotrigine to 50 mg qd. She has found this helpful for depression, though she is less sure about anxiety or irritability. She had a bad case of poison ivy and was put on steroids which always make her more anxious and irritable. She did find that taking the lamotrigine at night caused some sleep issues, but since switching it to AM dosing this has resolved. She otherwise tolerates it well.    She denies any suicidal ideation, intent or plan at present; denies any homicidal ideation, intent or plan at present.    She denies any auditory hallucinations, denies any visual hallucinations, denies any delusions.    She denies any side effects from current psychiatric medications.    HPI ROS Appetite Changes and Sleep:     She reports adequate number of sleep hours, adequate appetite, adequate energy level    Current Rating Scores:     None completed today.    Review Of Systems:    Mood anxiety and depression   Behavior appropriate, cooperative, and calm   Thought Content daily worries and negative thoughts   General relationship problems, emotional problems, and decreased functioning   Personality no change in personality   Other Psych Symptoms decreased memory and decreased concentration   Constitutional as noted in HPI   ENT as noted in HPI   Cardiovascular as noted in HPI   Respiratory as noted in HPI   Gastrointestinal as noted in HPI   Genitourinary as noted in HPI   Musculoskeletal as noted in HPI   Integumentary as noted in HPI   Neurological as noted in HPI   Endocrine negative   Other Symptoms none, all other systems are negative     Family Psychiatric History:     Family History   Problem Relation Age  of Onset    Cancer Mother     Cervical cancer Mother     Arthritis Mother     Asthma Father     Arthritis Father     Breast cancer Cousin         age unknown       Social/Substance Abuse History:    Social History     Socioeconomic History    Marital status: Legally      Spouse name: Not on file    Number of children: Not on file    Years of education: Not on file    Highest education level: Not on file   Occupational History    Not on file   Tobacco Use    Smoking status: Never     Passive exposure: Never    Smokeless tobacco: Never   Vaping Use    Vaping status: Never Used   Substance and Sexual Activity    Alcohol use: Yes     Alcohol/week: 1.0 standard drink of alcohol     Types: 1 Cans of beer per week     Comment: social    Drug use: Yes     Types: Marijuana    Sexual activity: Not on file   Other Topics Concern    Not on file   Social History Narrative    Not on file     Social Determinants of Health     Financial Resource Strain: Not on file   Food Insecurity: No Food Insecurity (6/27/2023)    Hunger Vital Sign     Worried About Running Out of Food in the Last Year: Never true     Ran Out of Food in the Last Year: Never true   Transportation Needs: No Transportation Needs (6/27/2023)    PRAPARE - Transportation     Lack of Transportation (Medical): No     Lack of Transportation (Non-Medical): No   Physical Activity: Not on file   Stress: Not on file   Social Connections: Not on file   Intimate Partner Violence: Not on file   Housing Stability: Low Risk  (6/27/2023)    Housing Stability Vital Sign     Unable to Pay for Housing in the Last Year: No     Number of Places Lived in the Last Year: 1     Unstable Housing in the Last Year: No       The following portions of the patient's history were reviewed and updated as appropriate: past family history, past medical history, past social history, past surgical history and problem list.    OBJECTIVE:     Mental Status Evaluation:  Appearance:  dressed  appropriately, adequate grooming, looks stated age   Behavior:  pleasant, cooperative, calm, interacts appropriately with this writer   Speech:  normal rate, normal volume, normal pitch   Mood:  depressed, anxious   Affect:  constricted   Thought Process:  organized, logical, coherent   Associations: intact associations   Thought Content:  no overt delusions, no paranoia noted on exam   Perceptual Disturbances: no auditory hallucinations, no visual hallucinations   Risk Potential: Suicidal ideation - None  Homicidal ideation - None  Potential for aggression - No   Sensorium:  oriented to person, place, and time/date   Memory:  recent and remote memory grossly intact   Consciousness:  alert and awake   Attention/Concentration: attention span and concentration are age appropriate   Insight:  age appropriate   Judgment: age appropriate   Gait/Station: Unable to assess today due to virtual visit   Motor Activity: unable to assess today due to virtual visit        Laboratory Results: I have personally reviewed all pertinent laboratory/tests results    Suicide/Homicide Risk Assessment:    Risk of Harm to Self:  The following ratings are based on assessment at the time of the interview  Based on today's assessment, Sweta presents the following risk of harm to self: none    Risk of Harm to Others:  The following ratings are based on assessment at the time of the interview  Based on today's assessment, Sweta presents the following risk of harm to others: none    The following interventions are recommended: no intervention changes needed     Assessment/Plan:      She does feel the lamotrigine is helpful for the depression. She is a little unsure if it has been helpful for anxiety or irritability as she was also on prednisone since her last visit and she had an increase in both. She tolerates the lamotrigine well and did well on it in the past so I have advised increasing this to 75 mg qAM (she felt it was causing sleep  issues at night). Lamotrigine PARQ completed including dizziness, headaches, ataxia, vision problems, somnolence, sleep changes, cognitive difficulties, rash (including Allen-Yann rash), and others, risk of teratogenicity for females. Her other medications will remain unchanged. She will continue to work with Nayely Nuñez LCSW, as well. We have discussed her safety plan and she agrees that if she experience unsafe thoughts that she will reach out to her supports including this office, the suicide hotline, and emergency services if necessary. Sweta is aware of non-emergent and emergent mental health resources. They are able to contract for their own safety at this time.    Will follow up in 1 months. Patient is aware to call the office if questions or concerns arise sooner.      Diagnoses and all orders for this visit:    Generalized anxiety disorder  -     lamoTRIgine (LaMICtal) 25 mg tablet; Take 3 tablets (75 mg total) by mouth daily    Major depressive disorder, recurrent, moderate (HCC)  -     lamoTRIgine (LaMICtal) 25 mg tablet; Take 3 tablets (75 mg total) by mouth daily    PTSD (post-traumatic stress disorder)        Treatment Recommendations/Precautions:    Continue current medications:     - buspirone 5 mg BID     - trazodone 100 mg qhs     - lorazepam 1 mg qd PRN for anxiety    Increase medication:    - lamotrigine 50 mg qd to 75 mg qd    Medication management every 1 months  Continue psychotherapy with [facility] therapist Nayely Nuñez LCSW  Aware of 24 hour and weekend coverage for urgent situations accessed by calling [facility] main practice number  I am scheduling this patient out for greater than 3 months: No    Medications Risks/Benefits      Risks, Benefits And Possible Side Effects Of Medications:    Risks, benefits, and possible side effects of medications explained to Sweta and she verbalizes understanding and agreement for treatment.    Controlled  Medication Discussion:     Sweta has been filling controlled prescriptions on time as prescribed according to [place] Prescription Drug Monitoring Program  Discussed with Sweta the risks of sedation, respiratory depression, impairment of ability to drive and potential for abuse and addiction related to treatment with benzodiazepine medications. She understands risk of treatment with benzodiazepine medications, agrees to not drive if feels impaired and agrees to take medications as prescribed    Psychotherapy Provided:     Individual psychotherapy provided: No     Treatment Plan:    Completed and signed during the session: Not applicable - Treatment Plan to be completed by [facility] therapist     Visit Time    Visit Start Time:  8:00 AM  Visit End Time:  8:15 AM  Total Visit Duration:  15 minutes    Aldo Topete 07/12/24      VIRTUAL VISIT DISCLAIMER    Sweta Campbell verbally agrees to participate in Virtual Care Services. Pt is aware that Virtual Care Services could be limited without vital signs or the ability to perform a full hands-on physical exam. Sweta Campbell understands she or the provider may request at any time to terminate the video visit and request the patient to seek care or treatment in person.

## 2024-07-24 ENCOUNTER — SOCIAL WORK (OUTPATIENT)
Dept: BEHAVIORAL/MENTAL HEALTH CLINIC | Facility: CLINIC | Age: 49
End: 2024-07-24
Payer: COMMERCIAL

## 2024-07-24 DIAGNOSIS — F41.1 GENERALIZED ANXIETY DISORDER: ICD-10-CM

## 2024-07-24 DIAGNOSIS — F43.10 PTSD (POST-TRAUMATIC STRESS DISORDER): ICD-10-CM

## 2024-07-24 DIAGNOSIS — F33.1 MAJOR DEPRESSIVE DISORDER, RECURRENT, MODERATE (HCC): Primary | ICD-10-CM

## 2024-07-24 PROCEDURE — 90834 PSYTX W PT 45 MINUTES: CPT | Performed by: SOCIAL WORKER

## 2024-07-24 NOTE — PSYCH
"This note was not shared with the patient due to this is a psychotherapy note    Behavioral Health Psychotherapy Progress Note    Psychotherapy Provided: Individual Psychotherapy     1. Major depressive disorder, recurrent, moderate (HCC)        2. Generalized anxiety disorder        3. PTSD (post-traumatic stress disorder)            Goals addressed in session: Goal 1 and Goal 2     DATA: Writer met with Sweta for an individual psychotherapy session. Sweta shared that she has been struggling emotionally. Sweta reported that she has felt some relief of anxiety and anger since finishing her steroids, but a significant argument happened between her and her significant other which has been weighing on her. Sweta shared about the argument, reporting that the same issues continue to occur in their relationship as no real change happens. Sweta shared that hurtful words were exchanged. Sweta spent time sharing her insight about how her trauma history plays a significant role in her ability to fully commit in her relationship. Sweta shared that because of her past, she is guarded with love and trust. Sweta reported that she is unsure if she sees value in working on the relationship as she has \"accepted\" that she will be alone. Sweta went on to report that she did suggest to her significant other that they utilize email as a way to express their thoughts and feelings since talking in person or in text seems to lead to arguments. Sweta recognized the need for both to be able to process what the other is saying before responding. Writer praised Sweta for her effort to have healthier communication and recognizing the barrier. Writer actively listened as Sweta shared and spent time processing thoughts and feelings. Writer offered emotional validation and utilized thought challenging with Sweta. Writer emphasized the importance of Sweta engaging in self-care and taking time to heal " "herself. Writer and Sweta also discussed realistic expectations.     During this session, this clinician used the following therapeutic modalities: Cognitive Behavioral Therapy and Supportive Psychotherapy    Substance Abuse was not addressed during this session. If the client is diagnosed with a co-occurring substance use disorder, please indicate any changes in the frequency or amount of use: NA. Stage of change for addressing substance use diagnoses: No substance use/Not applicable    ASSESSMENT:  Sweta Campbell presents with a Depressed mood.     her affect is Tearful, which is congruent, with her mood and the content of the session. The client has made progress on their goals.    Sweta was oriented X3 and well engaged. Eye contact was good, speech was of normal rate and tone. Thought content was normal; insight and judgement were intact. Sweta Campbell presents with a none risk of suicide, none risk of self-harm, and none risk of harm to others.    For any risk assessment that surpasses a \"low\" rating, a safety plan must be developed.    A safety plan was indicated: no  If yes, describe in detail NA    PLAN: Between sessions, Sweta Campbell will follow through with her communication efforts in her relationship; make a list of at least 5 personal strengths prior to next session. At the next session, the therapist will use Cognitive Behavioral Therapy and Supportive Psychotherapy to address stress/depression/anxiety.    Behavioral Health Treatment Plan and Discharge Planning: Sweta Campbell is aware of and agrees to continue to work on their treatment plan. They have identified and are working toward their discharge goals. yes    Visit start and stop times:    07/24/24  Start Time: 0900  Stop Time: 0948  Total Visit Time: 48 minutes  "

## 2024-08-07 ENCOUNTER — SOCIAL WORK (OUTPATIENT)
Dept: BEHAVIORAL/MENTAL HEALTH CLINIC | Facility: CLINIC | Age: 49
End: 2024-08-07
Payer: COMMERCIAL

## 2024-08-07 DIAGNOSIS — F33.1 MAJOR DEPRESSIVE DISORDER, RECURRENT, MODERATE (HCC): Primary | ICD-10-CM

## 2024-08-07 DIAGNOSIS — F43.10 PTSD (POST-TRAUMATIC STRESS DISORDER): ICD-10-CM

## 2024-08-07 DIAGNOSIS — F41.1 GENERALIZED ANXIETY DISORDER: ICD-10-CM

## 2024-08-07 PROCEDURE — 90834 PSYTX W PT 45 MINUTES: CPT | Performed by: SOCIAL WORKER

## 2024-08-07 NOTE — PSYCH
This note was not shared with the patient due to this is a psychotherapy note    Behavioral Health Psychotherapy Progress Note    Psychotherapy Provided: Individual Psychotherapy     1. Major depressive disorder, recurrent, moderate (HCC)        2. Generalized anxiety disorder        3. PTSD (post-traumatic stress disorder)            Goals addressed in session: Goal 1 and Goal 2     DATA: Writer met with Sweta for an individual psychotherapy session. Sweta shared that she did not write up the email to her significant other because she has been struggling to put her thoughts into words. Sweta shared that she is still in a rios in her mind about whether she wants to stay in the relationship. Sweta shared some of the exchanges the two have had recently which led her into other topics, requiring redirection from Writer. Sweta expressed concern for her son who has been experiencing increased stress recently. Sweta also discussed thoughts and feelings about her job. Writer actively listened as Sweta shared and spent time processing thoughts and feelings. Writer recognized Sweta's thoughts jumping all over the place. Sweta shared that she is aware and this is because she has so much on her mind. Writer and Sweta discussed how she can reign in her thoughts, as all as process them outside of therapy. Sweta expressed concern about being able to come for therapy because of financial strain but that she is determined to work it out.     During this session, this clinician used the following therapeutic modalities: Cognitive Behavioral Therapy and Supportive Psychotherapy    Substance Abuse was not addressed during this session. If the client is diagnosed with a co-occurring substance use disorder, please indicate any changes in the frequency or amount of use: NA. Stage of change for addressing substance use diagnoses: No substance use/Not applicable    ASSESSMENT:  Sweta Campbell presents  "with a Anxious mood.     her affect is Tearful, which is congruent, with her mood and the content of the session. The client has made progress on their goals.    Sweta was oriented X3 and well engaged. Eye contact was good, speech was of normal rate and tone. Thought content was normal; insight and judgement were intact. Sweta Campbell presents with a none risk of suicide, none risk of self-harm, and none risk of harm to others.    For any risk assessment that surpasses a \"low\" rating, a safety plan must be developed.    A safety plan was indicated: no  If yes, describe in detail NA    PLAN: Between sessions, Sweta Campbell will write down thoughts and feelings about her relationship to process next session. At the next session, the therapist will use Cognitive Behavioral Therapy and Supportive Psychotherapy to address stress/depression/anxiety/trauma.    Behavioral Health Treatment Plan and Discharge Planning: Sweta Campbell is aware of and agrees to continue to work on their treatment plan. They have identified and are working toward their discharge goals. yes    Visit start and stop times:    08/07/24  Start Time: 0900  Stop Time: 0950  Total Visit Time: 50 minutes  "

## 2024-08-08 ENCOUNTER — TELEPHONE (OUTPATIENT)
Age: 49
End: 2024-08-08

## 2024-08-08 NOTE — TELEPHONE ENCOUNTER
Patient called in to schedule rheum consult . I advise the patient she will need a referral from her PCP and provide her with our fax number

## 2024-08-16 ENCOUNTER — TELEMEDICINE (OUTPATIENT)
Dept: PSYCHIATRY | Facility: CLINIC | Age: 49
End: 2024-08-16
Payer: COMMERCIAL

## 2024-08-16 DIAGNOSIS — F43.10 PTSD (POST-TRAUMATIC STRESS DISORDER): ICD-10-CM

## 2024-08-16 DIAGNOSIS — F41.1 GENERALIZED ANXIETY DISORDER: ICD-10-CM

## 2024-08-16 DIAGNOSIS — F33.1 MAJOR DEPRESSIVE DISORDER, RECURRENT, MODERATE (HCC): Primary | ICD-10-CM

## 2024-08-16 PROCEDURE — 99214 OFFICE O/P EST MOD 30 MIN: CPT | Performed by: PHYSICIAN ASSISTANT

## 2024-08-16 RX ORDER — LAMOTRIGINE 100 MG/1
100 TABLET ORAL DAILY
Qty: 30 TABLET | Refills: 1 | Status: SHIPPED | OUTPATIENT
Start: 2024-08-16

## 2024-08-16 NOTE — PSYCH
This note was not shared with the patient due to reasonable likelihood of causing patient harm    Virtual Regular Visit    Visit Date: 08/16/24     Verification of patient location:    Patient is located at Home in the following state in which I hold an active license [place]    Problem List Items Addressed This Visit       Generalized anxiety disorder    Relevant Medications    lamoTRIgine (LaMICtal) 100 mg tablet    PTSD (post-traumatic stress disorder)    Major depressive disorder, recurrent, moderate (HCC) - Primary    Relevant Medications    lamoTRIgine (LaMICtal) 100 mg tablet     Reason for visit is   Chief Complaint   Patient presents with    Follow-up    Medication Management    Virtual Regular Visit     Encounter provider Aldo Topete    Provider located at [address]  [address]  [facility] [address] [phone]    Recent Visits  No visits were found meeting these conditions.  Showing recent visits within past 7 days and meeting all other requirements  Today's Visits  Date Type Provider Dept   08/16/24 Telemedicine Aldo Topete  [facility]   Showing today's visits and meeting all other requirements  Future Appointments  No visits were found meeting these conditions.  Showing future appointments within next 150 days and meeting all other requirements       The patient was identified by name and date of birth. Sweta Campbell was informed that this is a telemedicine visit and that the visit is being conducted throughthe Epic Embedded platform. She agrees to proceed.  My office door was closed. No one else was in the room. She acknowledged consent and understanding of privacy and security of the video platform. The patient has agreed to participate and understands they can discontinue the visit at any time.    Patient is aware this is a billable service.     Insurance: Payor: [employer] [place]  "/ Plan: [facility] OMNIA / Product Type: Blue Fee for Service /      Subjective:    Sweta Campbell is a alina 48 y.o. female with a history of Major Depressive Disorder, Generalized Anxiety Disorder, and PTSD who presents today for follow-up and medication management. Since her last visit she reports she did increase the lamotrigine. At first she thought this was quite helpful but this seems to have \"worn off\". She thinks this may be in part due to situational/environmental stressors. Her best friend's dad passed yesterday and she reports death is always \"triggering\" for her and brings up stuff from the past. She is also worried about her pup, Lester, as his hips seem to be bothering him felicia. She did start him on a new supplement which she hopes will help. She worries because she can't afford to bring him to the vet often.     She denies any suicidal ideation, intent or plan at present; denies any homicidal ideation, intent or plan at present.    She denies any auditory hallucinations, denies any visual hallucinations, denies any delusions.    She denies any side effects from current psychiatric medications.    HPI ROS Appetite Changes and Sleep:     She reports adequate number of sleep hours, adequate appetite, adequate energy level    Current Rating Scores:     None completed today.    Review Of Systems:    Mood anxiety and depression   Behavior appropriate, cooperative, and calm   Thought Content daily worries and negative thoughts   General emotional problems and decreased functioning   Personality no change in personality   Other Psych Symptoms decreased memory and decreased concentration   Constitutional as noted in HPI   ENT as noted in HPI   Cardiovascular as noted in HPI   Respiratory as noted in HPI   Gastrointestinal as noted in HPI   Genitourinary as noted in HPI   Musculoskeletal as noted in HPI   Integumentary as noted in HPI   Neurological as noted in HPI   Endocrine negative   Other " Symptoms none, all other systems are negative     Family Psychiatric History:     Family History   Problem Relation Age of Onset    Cancer Mother     Cervical cancer Mother     Arthritis Mother     Asthma Father     Arthritis Father     Breast cancer Cousin         age unknown       Social/Substance Abuse History:    Social History     Socioeconomic History    Marital status: Legally      Spouse name: Not on file    Number of children: Not on file    Years of education: Not on file    Highest education level: Not on file   Occupational History    Not on file   Tobacco Use    Smoking status: Never     Passive exposure: Never    Smokeless tobacco: Never   Vaping Use    Vaping status: Never Used   Substance and Sexual Activity    Alcohol use: Yes     Alcohol/week: 1.0 standard drink of alcohol     Types: 1 Cans of beer per week     Comment: social    Drug use: Yes     Types: Marijuana    Sexual activity: Not on file   Other Topics Concern    Not on file   Social History Narrative    Not on file     Social Determinants of Health     Financial Resource Strain: Not on file   Food Insecurity: No Food Insecurity (6/27/2023)    Hunger Vital Sign     Worried About Running Out of Food in the Last Year: Never true     Ran Out of Food in the Last Year: Never true   Transportation Needs: No Transportation Needs (6/27/2023)    PRAPARE - Transportation     Lack of Transportation (Medical): No     Lack of Transportation (Non-Medical): No   Physical Activity: Not on file   Stress: Not on file   Social Connections: Not on file   Intimate Partner Violence: Not on file   Housing Stability: Low Risk  (6/27/2023)    Housing Stability Vital Sign     Unable to Pay for Housing in the Last Year: No     Number of Places Lived in the Last Year: 1     Unstable Housing in the Last Year: No     The following portions of the patient's history were reviewed and updated as appropriate: past family history, past medical history, past social  "history, past surgical history and problem list.    OBJECTIVE:     Mental Status Evaluation:  Appearance:  dressed appropriately, adequate grooming, looks stated age   Behavior:  pleasant, cooperative, calm, interacts appropriately with this writer   Speech:  normal rate, normal volume, normal pitch   Mood:  depressed   Affect:  reactive, mood-congruent   Thought Process:  organized, logical, coherent   Associations: intact associations   Thought Content:  no overt delusions, no paranoia noted on exam   Perceptual Disturbances: no auditory hallucinations, no visual hallucinations   Risk Potential: Suicidal ideation - None  Homicidal ideation - None  Potential for aggression - No   Sensorium:  oriented to person, place, and time/date   Memory:  recent and remote memory grossly intact   Consciousness:  alert and awake   Attention/Concentration: attention span and concentration are age appropriate   Insight:  age appropriate   Judgment: age appropriate   Gait/Station: Unable to assess today due to virtual visit   Motor Activity: unable to assess today due to virtual visit        Laboratory Results: I have personally reviewed all pertinent laboratory/tests results    Suicide/Homicide Risk Assessment:    Risk of Harm to Self:  The following ratings are based on assessment at the time of the interview  Based on today's assessment, Sweta presents the following risk of harm to self: none    Risk of Harm to Others:  The following ratings are based on assessment at the time of the interview  Based on today's assessment, Sweta presents the following risk of harm to others: none    The following interventions are recommended: no intervention changes needed     Assessment/Plan:      She initially felt the increase in lamotrigine was helpful but it has since \"worn off\". She feels this may be in part due to situational stressors. She tolerated the increase well with no notable SE. I have advised increasing this to 100 mg qd. " In 2 weeks if she feels the anxiety is still an issue as well I will likely increase the buspirone to 10 mg BID (discussed with pt). I have a low threshold to add in an antidepressant as well. Lamotrigine PARQ completed including dizziness, headaches, ataxia, vision problems, somnolence, sleep changes, cognitive difficulties, rash (including Allen-Yann rash), and others, risk of teratogenicity for females. She will continue to work with Nayely Nuñez LCSW, as well. We have discussed her safety plan and she agrees that if she experience unsafe thoughts that she will reach out to her supports including this office, the suicide hotline, and emergency services if necessary. Sweta is aware of non-emergent and emergent mental health resources. They are able to contract for their own safety at this time.    Will follow up in 1 months. Patient is aware to call the office if questions or concerns arise sooner.      Diagnoses and all orders for this visit:    Major depressive disorder, recurrent, moderate (HCC)  -     lamoTRIgine (LaMICtal) 100 mg tablet; Take 1 tablet (100 mg total) by mouth daily    Generalized anxiety disorder  -     lamoTRIgine (LaMICtal) 100 mg tablet; Take 1 tablet (100 mg total) by mouth daily    PTSD (post-traumatic stress disorder)        Treatment Recommendations/Precautions:    Continue current medications:     - buspirone 5 mg BID     Increase medication:    - lamotrigine 75 mg qd to 100 mg qd    *lorazepam and trazodone currently rx by PCP    Medication management every 1 months  Continue psychotherapy with [facility] therapist Nayely Nuñez LCSW  Aware of 24 hour and weekend coverage for urgent situations accessed by calling [facility] main practice number  I am scheduling this patient out for greater than 3 months: No    Medications Risks/Benefits      Risks, Benefits And Possible Side Effects Of Medications:    Risks, benefits, and possible side effects of  medications explained to Sweta and she verbalizes understanding and agreement for treatment.    Controlled Medication Discussion:     Not applicable - controlled prescriptions are not prescribed by this practice    Psychotherapy Provided:     Individual psychotherapy provided: No     Treatment Plan:    Completed and signed during the session: Not applicable - Treatment Plan to be completed by [facility] therapist     Visit Time    Visit Start Time:  8:00 AM  Visit End Time:  8:20 AM  Total Visit Duration:  20 minutes    Aldo Topete 08/16/24      VIRTUAL VISIT DISCLAIMER    Sweta Campbell verbally agrees to participate in Virtual Care Services. Pt is aware that Virtual Care Services could be limited without vital signs or the ability to perform a full hands-on physical exam. Sweta Campbell understands she or the provider may request at any time to terminate the video visit and request the patient to seek care or treatment in person.

## 2024-08-21 ENCOUNTER — OFFICE VISIT (OUTPATIENT)
Dept: OBGYN CLINIC | Facility: CLINIC | Age: 49
End: 2024-08-21
Payer: COMMERCIAL

## 2024-08-21 ENCOUNTER — TELEMEDICINE (OUTPATIENT)
Dept: BEHAVIORAL/MENTAL HEALTH CLINIC | Facility: CLINIC | Age: 49
End: 2024-08-21
Payer: COMMERCIAL

## 2024-08-21 ENCOUNTER — TELEPHONE (OUTPATIENT)
Age: 49
End: 2024-08-21

## 2024-08-21 VITALS
BODY MASS INDEX: 30.83 KG/M2 | WEIGHT: 174 LBS | SYSTOLIC BLOOD PRESSURE: 100 MMHG | DIASTOLIC BLOOD PRESSURE: 74 MMHG | HEIGHT: 63 IN

## 2024-08-21 DIAGNOSIS — F43.10 PTSD (POST-TRAUMATIC STRESS DISORDER): ICD-10-CM

## 2024-08-21 DIAGNOSIS — Z01.419 WOMEN'S ANNUAL ROUTINE GYNECOLOGICAL EXAMINATION: Primary | ICD-10-CM

## 2024-08-21 DIAGNOSIS — F33.1 MAJOR DEPRESSIVE DISORDER, RECURRENT, MODERATE (HCC): Primary | ICD-10-CM

## 2024-08-21 DIAGNOSIS — F41.1 GENERALIZED ANXIETY DISORDER: ICD-10-CM

## 2024-08-21 PROCEDURE — S0610 ANNUAL GYNECOLOGICAL EXAMINA: HCPCS | Performed by: NURSE PRACTITIONER

## 2024-08-21 PROCEDURE — 90834 PSYTX W PT 45 MINUTES: CPT | Performed by: SOCIAL WORKER

## 2024-08-21 NOTE — PROGRESS NOTES
"  Subjective    HPI:     Sweta Campbell is a 48 y.o. female. She is a  2 Para 2, with C/S. History of hysterectomy about 20 yrs ago. She is currently  from spouse. She has a decrease libido. She states her IBS has improved. Denies Gyn complaints. She has interstitial cystitis. She feels safe at home. She  is working with a therapist and psychiatrist for her depression/anxiety.  Medical, surgical and family history reviewed. Her dental care is up-to-date. She tries to eat a healthy diet. She does not exercise regularly. Hx of bariatric surgery. She is happy with her weight.     Visit Vitals  /74 (BP Location: Right arm, Patient Position: Sitting)   Ht 5' 3\" (1.6 m)   Wt 78.9 kg (174 lb)   BMI 30.82 kg/m²   OB Status Hysterectomy   Smoking Status Never   BSA 1.82 m²       Gynecologic History    No LMP recorded. Patient has had a hysterectomy.    Hx of abnormal pap smears.   Last mammogram diagnostic B/L: 23. Results were: Benign  Colonoscopy: 2022 - repeat in 5 yrs    Obstetric and Medical History    OB History    Para Term  AB Living   2         2   SAB IAB Ectopic Multiple Live Births           2      # Outcome Date GA Lbr Yeison/2nd Weight Sex Type Anes PTL Lv   2             1                 Past Medical History:   Diagnosis Date    Abnormal ECG Years ago    Naz pardum cmp    Allergic     Anxiety     Asthma     Cardiomyopathy (HCC)     bradycardia normal 50-55 bpm    Coronary artery disease     Depression     Disease of thyroid gland     Hyperthyroidism     Early 20s    Interstitial cystitis     Migraine N/a    Through teenager to young adult       Past Surgical History:   Procedure Laterality Date    BARIATRIC SURGERY      x2 surgeries lap band and bypass    BREAST BIOPSY Left     usg bx negative     SECTION      x2    EAR RECONSTRUCTION Left     age 6 skin drum skin graft    HYSTERECTOMY         The following portions of the patient's " history were reviewed and updated as appropriate: allergies, current medications, past family history, past medical history, past social history, past surgical history, and problem list.    Review of Systems    Pertinent items are noted in HPI.      Objective    Physical Exam  Constitutional:       Appearance: Normal appearance. She is well-developed.   Genitourinary:      Vulva, bladder and urethral meatus normal.      No lesions in the vagina.      Right Labia: No rash, tenderness, lesions, skin changes or Bartholin's cyst.     Left Labia: No tenderness, lesions, skin changes, Bartholin's cyst or rash.     No labial fusion noted.      No inguinal adenopathy present in the right or left side.     Vaginal cuff intact.     No vaginal discharge, erythema, tenderness, bleeding or granulation tissue.      No vaginal prolapse present.     No vaginal atrophy present.       Right Adnexa: not tender, not full and no mass present.     Left Adnexa: not tender, not full and no mass present.     Cervix is absent.      Uterus is absent.      Pelvic exam was performed with patient in the lithotomy position.   Breasts:     Breasts are symmetrical.      Right: No inverted nipple, mass, nipple discharge, skin change or tenderness.      Left: No inverted nipple, mass, nipple discharge, skin change or tenderness.   HENT:      Head: Normocephalic and atraumatic.   Neck:      Thyroid: No thyromegaly.   Cardiovascular:      Rate and Rhythm: Normal rate and regular rhythm.      Heart sounds: Normal heart sounds, S1 normal and S2 normal.   Pulmonary:      Effort: Pulmonary effort is normal.      Breath sounds: Normal breath sounds.   Abdominal:      General: Bowel sounds are normal. There is no distension.      Palpations: Abdomen is soft. There is no mass.      Tenderness: There is no abdominal tenderness. There is no guarding.      Hernia: There is no hernia in the left inguinal area or right inguinal area.   Musculoskeletal:       Cervical back: Neck supple.   Lymphadenopathy:      Cervical: No cervical adenopathy.      Upper Body:      Right upper body: No supraclavicular or axillary adenopathy.      Left upper body: No supraclavicular or axillary adenopathy.      Lower Body: No right inguinal adenopathy. No left inguinal adenopathy.   Neurological:      Mental Status: She is alert.   Skin:     General: Skin is warm and dry.      Findings: No rash.   Psychiatric:         Attention and Perception: Attention and perception normal.         Mood and Affect: Mood and affect normal.         Speech: Speech normal.         Behavior: Behavior is cooperative.         Thought Content: Thought content normal.         Cognition and Memory: Cognition and memory normal.         Judgment: Judgment normal.   Vitals and nursing note reviewed.          Assessment and Plan    Sweta was seen today for gynecologic exam.    Diagnoses and all orders for this visit:    Women's annual routine gynecological examination      Patient informed of a Stable GYN exam. A pap smear was performed of the vaginal cuff due to hx of cervical cancer.    I have discussed the importance of exercise and healthy diet as well as adequate intake of calcium and vitamin D. The current ASCCP guidelines were reviewed. The low risk patient will receive pap smear screening every 3 years until the age of 29 and then every 3 to 5 years with HPV co-testing from the ages of 30-65. I emphasized the importance of an annual pelvic and breast exam. A yearly mammogram is due, script in Epic. Reminded to schedule appt.       Results will be released to [employer], if abnormal will call to review and discuss treatment plan.     All questions have been answered to her satisfaction.       Follow up in: 1 year or sooner if needed.

## 2024-08-21 NOTE — PSYCH
This note was not shared with the patient due to this is a psychotherapy note    Virtual Regular Visit    Verification of patient location:    Patient is located at Home in the following state in which I hold an active license [place]      Assessment/Plan:    Problem List Items Addressed This Visit       Generalized anxiety disorder    PTSD (post-traumatic stress disorder)    Major depressive disorder, recurrent, moderate (HCC) - Primary       Goals addressed in session: Goal 1 and Goal 2          Reason for visit is   Chief Complaint   Patient presents with    Virtual Regular Visit          Encounter provider Nayely Nuñez      Recent Visits  No visits were found meeting these conditions.  Showing recent visits within past 7 days and meeting all other requirements  Today's Visits  Date Type Provider Dept   08/21/24 Telemedicine Nayely Nuñez [facility] Therapist [facility]   Showing today's visits and meeting all other requirements  Future Appointments  No visits were found meeting these conditions.  Showing future appointments within next 150 days and meeting all other requirements       The patient was identified by name and date of birth. Sweta Campbell was informed that this is a telemedicine visit and that the visit is being conducted throughthe Epic Embedded platform. She agrees to proceed..  My office door was closed. No one else was in the room.  She acknowledged consent and understanding of privacy and security of the video platform. The patient has agreed to participate and understands they can discontinue the visit at any time.    Patient is aware this is a billable service.     Behavioral Health Psychotherapy Progress Note    Psychotherapy Provided: Individual Psychotherapy     1. Major depressive disorder, recurrent, moderate (HCC)        2. Generalized anxiety disorder        3. PTSD (post-traumatic stress disorder)            Goals addressed in session: Goal 1 and Goal 2     DATA:  "Writer met with Sweta for an individual psychotherapy session. Sweta shared that she attended a  of a friend's father yesterday and it was emotionally exhausting for her. Sweta explained how this brought up feelings of grief surrounding her own losses. Sweta spent time expressing her thoughts and feelings about this. Sweta went on to share that she did not complete her \"homework.\" Sweta reported that it was a busy two weeks. Sweta shared that her relationship with her significant other has been \"status quo\" recently. Sweta spent some time sharing her thoughts and feelings about the relationship. Writer recognized that Sweta is unsure of whether she wants to commit to working on improving the relationship. Writer and Sweta discussed how this is a barrier to her moving forward. Sweta spent some time discussing stress connected to her sister. Writer encouraged use of boundaries. Sweta also asked about Lamictal causing weight gain, stating that if it does, she is going to stop taking it. Writer advised Sweta to speak to her medication provider about this to make a decision about how to safely move forward;  Sweta expressed understanding. Writer actively listened as Sweta shared throughout session and assisted with processing thoughts and feelings. Writer offered emotional validation and utilized thought challenging. Writer emphasized self-care and healthy coping.     During this session, this clinician used the following therapeutic modalities: Cognitive Behavioral Therapy and Supportive Psychotherapy    Substance Abuse was not addressed during this session. If the client is diagnosed with a co-occurring substance use disorder, please indicate any changes in the frequency or amount of use: NA. Stage of change for addressing substance use diagnoses: No substance use/Not applicable    ASSESSMENT:  Sweta Campbell presents with a Depressed mood.     her affect is Normal " "range and intensity, which is congruent, with her mood and the content of the session. The client has made progress on their goals.    Sweta was oriented X3 and engaged. Eye contact was good, speech was of normal rate and tone. Thought content was normal; insight and judgement were intact. Sweta Campbell presents with a none risk of suicide, none risk of self-harm, and none risk of harm to others.    For any risk assessment that surpasses a \"low\" rating, a safety plan must be developed.    A safety plan was indicated: no  If yes, describe in detail NA    PLAN: Between sessions, Sweta Campbell will focus on self-care; write out thoughts and feelings about her relationship. At the next session, the therapist will use Cognitive Behavioral Therapy and Supportive Psychotherapy to address stress/depression/anxiety.    Behavioral Health Treatment Plan and Discharge Planning: Sweta Campbell is aware of and agrees to continue to work on their treatment plan. They have identified and are working toward their discharge goals. yes    Visit start and stop times:    08/21/24  Start Time: 0900  Stop Time: 0940  Total Visit Time: 40 minutes  "

## 2024-08-25 LAB
CYTOLOGIST CVX/VAG CYTO: NORMAL
DX ICD CODE: NORMAL
HPV GENOTYPE REFLEX: NORMAL
HPV I/H RISK 4 DNA CVX QL PROBE+SIG AMP: NEGATIVE
OTHER STN SPEC: NORMAL
PATH REPORT.FINAL DX SPEC: NORMAL
SL AMB NOTE:: NORMAL
SL AMB SPECIMEN ADEQUACY: NORMAL
SL AMB TEST METHODOLOGY: NORMAL

## 2024-08-27 ENCOUNTER — PATIENT MESSAGE (OUTPATIENT)
Dept: OBGYN CLINIC | Facility: CLINIC | Age: 49
End: 2024-08-27

## 2024-09-10 DIAGNOSIS — F41.9 ANXIETY: ICD-10-CM

## 2024-09-11 ENCOUNTER — TELEMEDICINE (OUTPATIENT)
Dept: BEHAVIORAL/MENTAL HEALTH CLINIC | Facility: CLINIC | Age: 49
End: 2024-09-11
Payer: COMMERCIAL

## 2024-09-11 ENCOUNTER — TELEPHONE (OUTPATIENT)
Age: 49
End: 2024-09-11

## 2024-09-11 DIAGNOSIS — F41.1 GENERALIZED ANXIETY DISORDER: ICD-10-CM

## 2024-09-11 DIAGNOSIS — F43.10 PTSD (POST-TRAUMATIC STRESS DISORDER): ICD-10-CM

## 2024-09-11 DIAGNOSIS — F33.1 MAJOR DEPRESSIVE DISORDER, RECURRENT, MODERATE (HCC): Primary | ICD-10-CM

## 2024-09-11 LAB — TSH SERPL DL<=0.005 MIU/L-ACNC: 7.89 UIU/ML (ref 0.45–4.5)

## 2024-09-11 PROCEDURE — 90834 PSYTX W PT 45 MINUTES: CPT | Performed by: SOCIAL WORKER

## 2024-09-11 NOTE — TELEPHONE ENCOUNTER
Patient called and needed to switch her appt to virtual due to no transportation. Writer was able to assist with this.

## 2024-09-11 NOTE — PSYCH
This note was not shared with the patient due to this is a psychotherapy note    Virtual Regular Visit    Verification of patient location:    Patient is located at Home in the following state in which I hold an active license [place]      Assessment/Plan:    Problem List Items Addressed This Visit       Generalized anxiety disorder    PTSD (post-traumatic stress disorder)    Major depressive disorder, recurrent, moderate (HCC) - Primary       Goals addressed in session: Goal 1 and Goal 2          Reason for visit is   Chief Complaint   Patient presents with    Virtual Regular Visit          Encounter provider Nayely Nuñez      Recent Visits  No visits were found meeting these conditions.  Showing recent visits within past 7 days and meeting all other requirements  Today's Visits  Date Type Provider Dept   09/11/24 Telemedicine Nayely Nu[facility]ez [facility] Therapist [facility]   Showing today's visits and meeting all other requirements  Future Appointments  No visits were found meeting these conditions.  Showing future appointments within next 150 days and meeting all other requirements       The patient was identified by name and date of birth. Sweta Campbell was informed that this is a telemedicine visit and that the visit is being conducted throughthe Epic Embedded platform. She agrees to proceed..  My office door was closed. No one else was in the room.  She acknowledged consent and understanding of privacy and security of the video platform. The patient has agreed to participate and understands they can discontinue the visit at any time.    Patient is aware this is a billable service.     Behavioral Health Psychotherapy Progress Note    Psychotherapy Provided: Individual Psychotherapy     1. Major depressive disorder, recurrent, moderate (HCC)        2. Generalized anxiety disorder        3. PTSD (post-traumatic stress disorder)            Goals addressed in session: Goal 1 and Goal 2     DATA:  "Writer met with Sweta for an individual psychotherapy session. Sweta reported that she has been struggling. Sweta shared that [place] was called on her sister and niece after an intense argument took place between her sister and significant other. Sweta reported that [place] did contact her for information which she did provide but emphasized wanting to be anonymous. Sweta reported that she is so anxious that her sister will find out that she gave information. Sweta reported that she has been having panic attacks, waking up anxious and feelings intense guilt. Sweta shared that she was raised not to share information like that and she feels she betrayed her sister. Sweta identified that she knows logically that she did the right thing, but the anxious feelings are overwhelming. Writer spent time exploring this with Sweta, offering emotional validation and utilizing thought challenging. Writer and Sweta processed how she could move forward and address the anxious thoughts. Writer emphasized the importance of Sweta focusing on herself, rather than her sister. Sweta identified a \"co-dependent relationship\" between her and her sister. Writer and Sweta discussed al-anon as a potential outlet. Writer encouraged healthy coping and use of thought challenging between sessions.     During this session, this clinician used the following therapeutic modalities: Cognitive Behavioral Therapy and Supportive Psychotherapy    Substance Abuse was not addressed during this session. If the client is diagnosed with a co-occurring substance use disorder, please indicate any changes in the frequency or amount of use: NA. Stage of change for addressing substance use diagnoses: No substance use/Not applicable    ASSESSMENT:  Sweta Campbell presents with a Anxious and Depressed mood.     her affect is Tearful, which is congruent, with her mood and the content of the session. The client has made progress on " "their goals.    Sweta was oriented X3 and well engaged. Eye contact was good, speech was of normal rate and tone. Thought content was normal; insight and judgement were intact. Sweta Campbell presents with a none risk of suicide, none risk of self-harm, and none risk of harm to others.    For any risk assessment that surpasses a \"low\" rating, a safety plan must be developed.    A safety plan was indicated: no  If yes, describe in detail NA    PLAN: Between sessions, Sweta Campbell will practice thought challenging and healthy coping. At the next session, the therapist will use Cognitive Behavioral Therapy and Supportive Psychotherapy to address stress/depression/anxiety/trauma.    Behavioral Health Treatment Plan and Discharge Planning: Sweta Campbell is aware of and agrees to continue to work on their treatment plan. They have identified and are working toward their discharge goals. yes    Visit start and stop times:    09/11/24  Start Time: 0900  Stop Time: 0948  Total Visit Time: 48 minutes  "

## 2024-09-12 DIAGNOSIS — E03.9 HYPOTHYROIDISM, UNSPECIFIED TYPE: ICD-10-CM

## 2024-09-12 RX ORDER — LEVOTHYROXINE SODIUM 175 UG/1
175 TABLET ORAL
Qty: 90 TABLET | Refills: 1 | Status: SHIPPED | OUTPATIENT
Start: 2024-09-12

## 2024-09-12 RX ORDER — LORAZEPAM 1 MG/1
1 TABLET ORAL DAILY PRN
Qty: 30 TABLET | Refills: 0 | Status: SHIPPED | OUTPATIENT
Start: 2024-09-12

## 2024-09-17 ENCOUNTER — TELEPHONE (OUTPATIENT)
Age: 49
End: 2024-09-17

## 2024-09-17 NOTE — TELEPHONE ENCOUNTER
Patient is calling regarding cancelling an appointment.    Date/Time: 9/18/2024 9am    Reason:     Patient was rescheduled: YES [] NO [x]  If yes, when was Patient reschedule for:     Patient requesting call back to reschedule: YES [] NO [x]

## 2024-09-24 ENCOUNTER — NURSE TRIAGE (OUTPATIENT)
Age: 49
End: 2024-09-24

## 2024-09-24 NOTE — TELEPHONE ENCOUNTER
Patient returning call. She stated that she received a Health Summary notification in the documents section of her MyChart as soon as she ended the previous phone call.  She was unsure what it was or what it was related to.  Advised her that I see a few documents including Medical Communication Consent and something related to her upcoming ECHO.  She will wait until the document is no longer pending and return call with additional questions

## 2024-09-24 NOTE — TELEPHONE ENCOUNTER
"Pt called in returning missed call. Pt did not check to see if caller LVM. Reviewed encounters and did not note documentation about calls from [facility] or any other specialty. Pt verbalized understanding. No further action needed at this time.     Reason for Disposition   Information only question and nurse able to answer    Answer Assessment - Initial Assessment Questions  1. REASON FOR CALL or QUESTION: \"What is your reason for calling today?\" or \"How can I best help you?\" or \"What question do you have that I can help answer?\"      Returning missed call    Protocols used: Information Only Call - No Triage-ADULT-[place]    "

## 2024-09-25 ENCOUNTER — HOSPITAL ENCOUNTER (OUTPATIENT)
Dept: NON INVASIVE DIAGNOSTICS | Facility: HOSPITAL | Age: 49
Discharge: HOME | End: 2024-09-25
Attending: INTERNAL MEDICINE
Payer: COMMERCIAL

## 2024-09-25 VITALS
HEIGHT: 63 IN | WEIGHT: 174 LBS | HEART RATE: 62 BPM | BODY MASS INDEX: 30.83 KG/M2 | DIASTOLIC BLOOD PRESSURE: 74 MMHG | SYSTOLIC BLOOD PRESSURE: 100 MMHG

## 2024-09-25 LAB
AORTIC ROOT: 2.9 CM
APICAL FOUR CHAMBER EJECTION FRACTION: 52 %
AV LVOT PEAK GRADIENT: 3 MMHG
AV PEAK GRADIENT: 9 MMHG
AV REGURGITATION PRESSURE HALF TIME: 548 MS
BSA FOR ECHO PROCEDURE: 1.82 M2
DOP CALC LVOT AREA: 3.14 CM2
DOP CALC LVOT DIAMETER: 2 CM
E WAVE DECELERATION TIME: 268 MS
E/A RATIO: 0.89
FRACTIONAL SHORTENING: 22 (ref 28–44)
GLOBAL LONGITUIDAL STRAIN: -17 %
INTERVENTRICULAR SEPTUM IN DIASTOLE (PARASTERNAL SHORT AXIS VIEW): 0.7 CM
INTERVENTRICULAR SEPTUM: 0.7 CM (ref 0.6–1.1)
LAAS-AP2: 27 CM2
LAAS-AP4: 23.5 CM2
LEFT ATRIUM SIZE: 3.8 CM
LEFT ATRIUM VOLUME (MOD BIPLANE): 90 ML
LEFT ATRIUM VOLUME INDEX (MOD BIPLANE): 49.5 ML/M2
LEFT INTERNAL DIMENSION IN SYSTOLE: 5 CM (ref 2.1–4)
LEFT VENTRICLE DIASTOLIC VOLUME (MOD BIPLANE): 183 ML
LEFT VENTRICLE DIASTOLIC VOLUME INDEX (MOD BIPLANE): 100.5 ML/M2
LEFT VENTRICLE SYSTOLIC VOLUME (MOD BIPLANE): 99 ML
LEFT VENTRICLE SYSTOLIC VOLUME INDEX (MOD BIPLANE): 54.4 ML/M2
LEFT VENTRICULAR INTERNAL DIMENSION IN DIASTOLE: 6.4 CM (ref 3.5–6)
LEFT VENTRICULAR POSTERIOR WALL IN END DIASTOLE: 0.6 CM
LEFT VENTRICULAR STROKE VOLUME: 88 ML
LV EF: 46 %
LVSV (TEICH): 88 ML
MV E'TISSUE VEL-SEP: 10 CM/S
MV PEAK A VEL: 0.92 M/S
MV PEAK E VEL: 82 CM/S
MV STENOSIS PRESSURE HALF TIME: 78 MS
MV VALVE AREA P 1/2 METHOD: 2.82
RIGHT ATRIUM AREA SYSTOLE A4C: 18.4 CM2
RIGHT VENTRICLE ID DIMENSION: 4 CM
SL CV AV DECELERATION TIME RETROGRADE: 1890 MS
SL CV AV PEAK GRADIENT RETROGRADE: 64 MMHG
SL CV LEFT ATRIUM LENGTH A2C: 5.3 CM
SL CV LV EF: 45
SL CV PED ECHO LEFT VENTRICLE DIASTOLIC VOLUME (MOD BIPLANE) 2D: 206 ML
SL CV PED ECHO LEFT VENTRICLE SYSTOLIC VOLUME (MOD BIPLANE) 2D: 117 ML
TRICUSPID ANNULAR PLANE SYSTOLIC EXCURSION: 2.2 CM

## 2024-09-25 PROCEDURE — 93306 TTE W/DOPPLER COMPLETE: CPT

## 2024-09-25 PROCEDURE — 93306 TTE W/DOPPLER COMPLETE: CPT | Performed by: INTERNAL MEDICINE

## 2024-09-30 ENCOUNTER — OFFICE VISIT (OUTPATIENT)
Dept: AUDIOLOGY | Facility: CLINIC | Age: 49
End: 2024-09-30
Payer: COMMERCIAL

## 2024-09-30 ENCOUNTER — OFFICE VISIT (OUTPATIENT)
Dept: OTOLARYNGOLOGY | Facility: CLINIC | Age: 49
End: 2024-09-30
Payer: COMMERCIAL

## 2024-09-30 VITALS
WEIGHT: 174 LBS | TEMPERATURE: 97.5 F | HEIGHT: 63 IN | OXYGEN SATURATION: 97 % | HEART RATE: 108 BPM | BODY MASS INDEX: 30.83 KG/M2

## 2024-09-30 DIAGNOSIS — H90.3 SENSORINEURAL HEARING LOSS, BILATERAL: Primary | ICD-10-CM

## 2024-09-30 DIAGNOSIS — H69.92 ETD (EUSTACHIAN TUBE DYSFUNCTION), LEFT: ICD-10-CM

## 2024-09-30 DIAGNOSIS — H93.13 BILATERAL TINNITUS: ICD-10-CM

## 2024-09-30 DIAGNOSIS — Z96.22 HISTORY OF TYMPANOSTOMY TUBE PLACEMENT: Primary | ICD-10-CM

## 2024-09-30 DIAGNOSIS — H90.3 SENSORINEURAL HEARING LOSS (SNHL), BILATERAL: ICD-10-CM

## 2024-09-30 DIAGNOSIS — H60.8X3 CHRONIC REACTIVE OTITIS EXTERNA OF BOTH EARS: ICD-10-CM

## 2024-09-30 PROCEDURE — 92552 PURE TONE AUDIOMETRY AIR: CPT | Performed by: AUDIOLOGIST

## 2024-09-30 PROCEDURE — 92567 TYMPANOMETRY: CPT | Performed by: AUDIOLOGIST

## 2024-09-30 PROCEDURE — 99214 OFFICE O/P EST MOD 30 MIN: CPT | Performed by: NURSE PRACTITIONER

## 2024-09-30 RX ORDER — FLUOCINOLONE ACETONIDE 0.11 MG/ML
5 OIL AURICULAR (OTIC) 2 TIMES DAILY PRN
Qty: 20 ML | Refills: 3 | Status: SHIPPED | OUTPATIENT
Start: 2024-09-30

## 2024-09-30 NOTE — PATIENT INSTRUCTIONS
Ear care at home - avoidance of q-tips, Hydrocortisone cream pea sized amount on finger as needed for itching in ears. Dermotic ear drops as needed    Tinnitus and possible causes including medications, viral illness, inner ear disorder, TMJ syndrome, or hearing changes.   Options for bilateral tinnitus including adding background noise, tinnitus retraining therapy, masking device, Resound tinnitus radha, Acupuncture, or acceptance.  Limit caffeine and ibuprofen intake.Lipoflavinoid vitamins. Discouraged q-tip use due to damage of ear canal.  Consider Flonase daily for eustachian tube dysfunction.  No specific medications or surgery indicated for treatment of tinnitus.  Consider MRI imaging if worsens     Fluticasone nasal spray one spray each nostril twice per day  Astelin nasal spray one spray each nostril twice per day    TMJ syndrome - soft diet, jaw rest, NSAIDs, warm compresses, massage, physical therapy, oral appliance, or consultation with dentist for

## 2024-09-30 NOTE — PROGRESS NOTES
ENT HEARING EVALUATION    Name:  Sweta Campbell  :  1975  Age:  49 y.o.   MRN:  [id number]  Date of Evaluation: 24     HISTORY:    Reason for visit: Known Hearing Loss binaurally    Sweta Campbell is being seen today for an evaluation of hearing as part of her ENT visit. The patient  denies notes a positive history of fullness and tinnitus.     EVALUATION:    Otoscopic Evaluation:   Right Ear: Unremarkable, canal clear   Left Ear: Unremarkable, canal clear    Tympanometry:   Right Ear: Type A; normal middle ear pressure and static compliance    Left Ear: Type A; normal middle ear pressure and static compliance     Speech Audiometry:  DNT- Per patient request (Self Pay)    Word Recognition Scores (WRS):  DNT- Per patient request (Self Pay)    Pure Tone Audiometry:  Conventional pure tone audiometry from 250 - 8000 Hz  was obtained with good reliability and revealed the following:     Right Ear: Normal sloping to moderate sensorineural hearing loss (SNHL)    Left Ear: Normal sloping to moderate sensorineural hearing loss (SNHL)     No significant change in hearing thresholds since previous evaluation on 22    *see attached audiogram    IMPRESSIONS:   Stable sensorineural hearing loss bilaterally    RECOMMENDATIONS:  Annual hearing eval and Hearing Aid Evaluation  Return to ENT as scheduled to review results    Freda Orellana, CCC-A  Clinical Audiologist

## 2024-09-30 NOTE — PROGRESS NOTES
Assessment/Plan:      Diagnoses and all orders for this visit:    History of tympanostomy tube placement    ETD (Eustachian tube dysfunction), left    Chronic reactive otitis externa of both ears  -     fluocinolone acetonide (DERMOTIC) 0.01 % otic oil; Administer 5 drops into both ears 2 (two) times a day as needed (ear itching)    Bilateral tinnitus    Sensorineural hearing loss (SNHL), bilateral          Symptoms include left ear blocked for past 3 months s/p URI. History of multiple pe tubes and tympanoplasty as a child.   Previous treatment includes oral antibiotics and use of Flonase with Astelin for 4 to 6 weeks.   On exam, right Tm monomer and tympanosclerosis. Left TM normal appearance, no overt fluid, TM intact. Reviewed risks hearing change due to serous fluid. Discussed SNHL vs conductive hearing loss.   Recommend audiogram today   Audiogram reviewed and interpreted and revealed bilateral mild sloping to moderate SNHL with type A tymps.  Reassured no serous fluid, no current indication for pe tubes.     Discussed may take up to 3 months for otitis media in adults to resolve on its own.   Reviewed options for otitis media in adult including resume nasal steroids, oral steroids, decongestants, Ct scan of sinuses    Reviewed ear pressure not due serous fluid. Suspect ETD vs TMJ causing ear pressure symptoms    Discussed Tinnitus and possible causes including medications, viral illness, inner ear disorder, TMJ syndrome, or hearing changes. Based on audiogram, tinnitus most likely associated with SNHL  Options for bilateral tinnitus including adding background noise, tinnitus retraining therapy, masking device, Resound tinnitus radha, Acupuncture, or acceptance.  Limit caffeine and ibuprofen intake. Discouraged q-tip use due to damage of ear canal. Lipoflavinoid vitamins.  Consider Flonase daily for eustachian tube dysfunction.  No specific medications or surgery indicated for treatment of tinnitus.  Consider  MRI imaging if worsens     After discussion agree to  Fluticasone nasal spray one spray each nostril twice per day  Astelin nasal spray one spray each nostril twice per day  Ear care at home - avoidance of q-tips, Hydrocortisone cream pea sized amount on finger as needed for itching in ears. Dermotic ear drops as needed  TMJ syndrome - soft diet, jaw rest, NSAIDs, warm compresses, massage, physical therapy, oral appliance, or consultation with dentist for   Follow up  annually, sooner if worsens     Subjective:     Patient ID: Sweta Campbell is a 49 y.o. female.    Presents today as a follow up due to ear concerns.  Symptoms began with URI about 2 to 3 months ago. Decreased hearing left ear. Left tinnitus.  Pressure and left Otalgia.  No otorrhea.  History of ear surgery.  Multiple sets of ear tubes as child. At age 6 ot 7 had graft to ear drum. Current over the counter hearing aids.    Treated in urgent care about two weeks ago for left ear infection. Treated with oral antibiotics and nasal steroids.     Since last visit, sensation stepped off air plane in left ear. No otalgia but has ear pressure. No otorrhea. Ears itching. High pitched ringing tinnitus. No pulsing in ears. Stopped nasal sprays.                Review of Systems   Constitutional: Negative.    HENT:  Negative for congestion, ear discharge, ear pain, hearing loss, nosebleeds, postnasal drip, rhinorrhea, sinus pressure, sinus pain, sore throat, tinnitus and voice change.    Respiratory:  Negative for chest tightness and shortness of breath.    Skin:  Negative for color change.   Neurological:  Negative for dizziness, numbness and headaches.   Psychiatric/Behavioral: Negative.           Objective:     Physical Exam  Constitutional:       Appearance: She is well-developed.   HENT:      Head: Normocephalic.      Right Ear: Hearing, ear canal and external ear normal. No decreased hearing noted. No drainage or tenderness. Tympanic membrane is  scarred. Tympanic membrane is not perforated or erythematous.      Left Ear: Hearing, tympanic membrane, ear canal and external ear normal. No decreased hearing noted. No drainage or tenderness. Tympanic membrane is not perforated or erythematous.      Nose: Nose normal. No nasal deformity or septal deviation.      Mouth/Throat:      Mouth: Mucous membranes are not pale and not dry. No oral lesions.      Dentition: Normal dentition.      Pharynx: Uvula midline. No oropharyngeal exudate.   Neck:      Trachea: No tracheal deviation.   Pulmonary:      Effort: Pulmonary effort is normal. No accessory muscle usage or respiratory distress.   Musculoskeletal:      Cervical back: Full passive range of motion without pain and neck supple.   Lymphadenopathy:      Cervical: No cervical adenopathy.   Skin:     General: Skin is warm and dry.   Neurological:      Mental Status: She is alert and oriented to person, place, and time.      Cranial Nerves: No cranial nerve deficit.      Sensory: No sensory deficit.   Psychiatric:         Behavior: Behavior is cooperative.

## 2024-10-01 ENCOUNTER — TELEPHONE (OUTPATIENT)
Age: 49
End: 2024-10-01

## 2024-10-01 NOTE — TELEPHONE ENCOUNTER
Pt called to get her appt for 10/2 at 9am switched to a virtual visit due to pt.  Writer switched appt.

## 2024-10-02 ENCOUNTER — TELEMEDICINE (OUTPATIENT)
Dept: BEHAVIORAL/MENTAL HEALTH CLINIC | Facility: CLINIC | Age: 49
End: 2024-10-02
Payer: COMMERCIAL

## 2024-10-02 DIAGNOSIS — F41.1 GENERALIZED ANXIETY DISORDER: ICD-10-CM

## 2024-10-02 DIAGNOSIS — F33.1 MAJOR DEPRESSIVE DISORDER, RECURRENT, MODERATE (HCC): ICD-10-CM

## 2024-10-02 DIAGNOSIS — F43.10 PTSD (POST-TRAUMATIC STRESS DISORDER): Primary | ICD-10-CM

## 2024-10-02 PROCEDURE — 90832 PSYTX W PT 30 MINUTES: CPT | Performed by: SOCIAL WORKER

## 2024-10-02 NOTE — PSYCH
This note was not shared with the patient due to this is a psychotherapy note    Virtual Regular Visit    Verification of patient location:    Patient is located at Home in the following state in which I hold an active license [place]      Assessment/Plan:    Problem List Items Addressed This Visit       Generalized anxiety disorder    PTSD (post-traumatic stress disorder) - Primary    Major depressive disorder, recurrent, moderate (HCC)       Goals addressed in session: Goal 1 and Goal 2          Reason for visit is   Chief Complaint   Patient presents with    Virtual Regular Visit          Encounter provider Nayely Nuñez      Recent Visits  No visits were found meeting these conditions.  Showing recent visits within past 7 days and meeting all other requirements  Today's Visits  Date Type Provider Dept   10/02/24 Telemedicine Nayely Nu[facility]ez [facility] Therapist [facility]   Showing today's visits and meeting all other requirements  Future Appointments  No visits were found meeting these conditions.  Showing future appointments within next 150 days and meeting all other requirements       The patient was identified by name and date of birth. Sweta Campbell was informed that this is a telemedicine visit and that the visit is being conducted throughthe Epic Embedded platform. She agrees to proceed..  My office door was closed. No one else was in the room.  She acknowledged consent and understanding of privacy and security of the video platform. The patient has agreed to participate and understands they can discontinue the visit at any time.    Patient is aware this is a billable service.     Behavioral Health Psychotherapy Progress Note    Psychotherapy Provided: Individual Psychotherapy     1. PTSD (post-traumatic stress disorder)        2. Major depressive disorder, recurrent, moderate (HCC)        3. Generalized anxiety disorder            Goals addressed in session: Goal 1 and Goal 2     DATA:  "Writer met with Sweta for an individual psychotherapy session. Sweta reported that \"everything has been status quo.\" Sweta shared that she did start going to the gym twice a week with one of her good friends. Sweta reported noticing the benefit of doing so and intends to continue going to the gym. Sweta shared that she believes her thyroid levels are evening out and as a result, she has noticed a reduction in anxiety. Sweta shared that she still has anxiety, but is managing it better. Writer made the observation that Sweta was presenting much more calm than she did last session. Swtea went on to share that she is feeling less anxious about the [facility] involvement with her sister. Sweta reported that she sees the good that has come out of [facility] involvement for her sister, niece and grandchildren. Sweta shared that she continues to try to make some effort with her significant other, but is put off when she feels he is not putting the same effort back.     Writer actively listened as Sweta shared and spent time processing thoughts and feelings. Writer offered emotional validation to Sweta. Writer emphasized the positive nature of Sweta's effort with self-care (going to the gym). Writer utilized some thought redirection and encouraged Sweta to continue to utilize thought challenging when experiencing anxious/intrusive thoughts.     During this session, this clinician used the following therapeutic modalities: Cognitive Behavioral Therapy and Supportive Psychotherapy    Substance Abuse was not addressed during this session. If the client is diagnosed with a co-occurring substance use disorder, please indicate any changes in the frequency or amount of use: NA. Stage of change for addressing substance use diagnoses: No substance use/Not applicable    ASSESSMENT:  Sweta Campbell presents with a Euthymic/ normal mood.     her affect is Normal range and intensity, which is congruent, " "with her mood and the content of the session. The client has made progress on their goals.    Sweta was oriented X3 and well engaged. Eye contact was good, speech was of normal rate and tone. Thought content was normal; insight and judgement were intact. Sweta Campbell presents with a none risk of suicide, none risk of self-harm, and none risk of harm to others.    For any risk assessment that surpasses a \"low\" rating, a safety plan must be developed.    A safety plan was indicated: no  If yes, describe in detail NA    PLAN: Between sessions, Swtea Campbell will continue going to the gym; utilized thought challenging to address anxious/intrusive thoughts. At the next session, the therapist will use Cognitive Behavioral Therapy and Supportive Psychotherapy to address stress/depression/anxiety/trauma.    Behavioral Health Treatment Plan and Discharge Planning: Sweta Campbell is aware of and agrees to continue to work on their treatment plan. They have identified and are working toward their discharge goals. yes    Visit start and stop times:    10/02/24  Start Time: 0900  Stop Time: 0936  Total Visit Time: 36 minutes  "

## 2024-10-10 DIAGNOSIS — F33.1 MAJOR DEPRESSIVE DISORDER, RECURRENT, MODERATE (HCC): ICD-10-CM

## 2024-10-10 DIAGNOSIS — F41.1 GENERALIZED ANXIETY DISORDER: ICD-10-CM

## 2024-10-10 RX ORDER — LAMOTRIGINE 100 MG/1
100 TABLET ORAL DAILY
Qty: 30 TABLET | Refills: 0 | Status: SHIPPED | OUTPATIENT
Start: 2024-10-10

## 2024-10-14 ENCOUNTER — TELEPHONE (OUTPATIENT)
Age: 49
End: 2024-10-14

## 2024-10-14 NOTE — TELEPHONE ENCOUNTER
Patient is calling regarding cancelling an appointment.    Date/Time: 10/16 @9am    Reason: N/A    Patient was rescheduled: YES [x] NO []  If yes, when was Patient reschedule for: Next upcoming appointment is on 10/30 @9    Patient requesting call back to reschedule: YES [] NO [x]    Patient did confirm their medication management appointment on 10/18 @10:30

## 2024-10-15 ENCOUNTER — TELEMEDICINE (OUTPATIENT)
Dept: BEHAVIORAL/MENTAL HEALTH CLINIC | Facility: CLINIC | Age: 49
End: 2024-10-15
Payer: COMMERCIAL

## 2024-10-15 ENCOUNTER — TELEPHONE (OUTPATIENT)
Age: 49
End: 2024-10-15

## 2024-10-15 DIAGNOSIS — F41.1 GENERALIZED ANXIETY DISORDER: ICD-10-CM

## 2024-10-15 DIAGNOSIS — F43.10 PTSD (POST-TRAUMATIC STRESS DISORDER): ICD-10-CM

## 2024-10-15 DIAGNOSIS — F33.1 MAJOR DEPRESSIVE DISORDER, RECURRENT, MODERATE (HCC): Primary | ICD-10-CM

## 2024-10-15 PROCEDURE — 90834 PSYTX W PT 45 MINUTES: CPT | Performed by: SOCIAL WORKER

## 2024-10-15 NOTE — TELEPHONE ENCOUNTER
Patient contacted the office to schedule a follow up visit with provider. Patient is now scheduled for 10/15/2024  at 2pm virtually.

## 2024-10-15 NOTE — PSYCH
This note was not shared with the patient due to this is a psychotherapy note    Virtual Regular Visit    Verification of patient location:    Patient is located at Home in the following state in which I hold an active license [place]      Assessment/Plan:    Problem List Items Addressed This Visit       Generalized anxiety disorder    PTSD (post-traumatic stress disorder)    Major depressive disorder, recurrent, moderate (HCC) - Primary       Goals addressed in session: Goal 1 and Goal 2          Reason for visit is   Chief Complaint   Patient presents with    Virtual Regular Visit          Encounter provider Nayely Nuñez      Recent Visits  No visits were found meeting these conditions.  Showing recent visits within past 7 days and meeting all other requirements  Today's Visits  Date Type Provider Dept   10/15/24 Telemedicine Nayely Nu[facility]ez [facility] Therapist [facility]   Showing today's visits and meeting all other requirements  Future Appointments  No visits were found meeting these conditions.  Showing future appointments within next 150 days and meeting all other requirements       The patient was identified by name and date of birth. Sweta Campbell was informed that this is a telemedicine visit and that the visit is being conducted throughthe Epic Embedded platform. She agrees to proceed..  My office door was closed. No one else was in the room.  She acknowledged consent and understanding of privacy and security of the video platform. The patient has agreed to participate and understands they can discontinue the visit at any time.    Patient is aware this is a billable service.     Behavioral Health Psychotherapy Progress Note    Psychotherapy Provided: Individual Psychotherapy     1. Major depressive disorder, recurrent, moderate (HCC)        2. Generalized anxiety disorder        3. PTSD (post-traumatic stress disorder)            Goals addressed in session: Goal 1 and Goal 2     DATA:  Writer met with  Sweta for an individual psychotherapy session. Sweta reported that she has been feeling very anxious, again, about her sister. Sweta spent time discussing the challenges her sister is currently facing and hoping that her sister does not find out the information she shared with a [facility] worker. Writer utilized thought challenging with Sweta to address the unhelpful thoughts she identified. Sweta went on to discuss other stressors with work, her son, her health, finances and her relationship with her significant other. Sweta reported that she feels as if she is not making progress on any of these stressors. Sweta reported that she wakes up in the morning and is flooded with her worries. Writer actively listened as Sweta shared and spent time processing thoughts and feelings. Writer offered emotional validation and thought redirection. Writer asked Sweta to prioritize her stressors, noting that she cannot fix everything at once. Sweta agreed to spend time doing this between sessions. Writer encouraged self-care and healthy coping.     During this session, this clinician used the following therapeutic modalities: Cognitive Behavioral Therapy and Supportive Psychotherapy    Substance Abuse was not addressed during this session. If the client is diagnosed with a co-occurring substance use disorder, please indicate any changes in the frequency or amount of use: NA. Stage of change for addressing substance use diagnoses: No substance use/Not applicable    ASSESSMENT:  Sweta Campbell presents with a Anxious and Depressed mood.     her affect is Normal range and intensity and Tearful, which is congruent, with her mood and the content of the session. The client has made progress on their goals.    Sweta was oriented X3 and well engaged. Eye contact was good, speech was of normal rate and tone. Thought content was normal; insight and judgement were intact. Sweta Campbell  "presents with a none risk of suicide, none risk of self-harm, and none risk of harm to others.    For any risk assessment that surpasses a \"low\" rating, a safety plan must be developed.    A safety plan was indicated: no  If yes, describe in detail NA    PLAN: Between sessions, Sweta Campbell will prioritize her stressors to help her focus on making progress, one stressors at a time. At the next session, the therapist will use Cognitive Behavioral Therapy and Supportive Psychotherapy to address stress/depression/anxiety/trauma.    Behavioral Health Treatment Plan and Discharge Planning: Sweta Campbell is aware of and agrees to continue to work on their treatment plan. They have identified and are working toward their discharge goals. yes    Visit start and stop times:    10/15/24  Start Time: 1400  Stop Time: 1445  Total Visit Time: 45 minutes  "

## 2024-10-18 ENCOUNTER — TELEMEDICINE (OUTPATIENT)
Dept: PSYCHIATRY | Facility: CLINIC | Age: 49
End: 2024-10-18
Payer: COMMERCIAL

## 2024-10-18 DIAGNOSIS — F33.1 MAJOR DEPRESSIVE DISORDER, RECURRENT, MODERATE (HCC): Primary | ICD-10-CM

## 2024-10-18 DIAGNOSIS — F41.1 GENERALIZED ANXIETY DISORDER: ICD-10-CM

## 2024-10-18 DIAGNOSIS — F43.10 PTSD (POST-TRAUMATIC STRESS DISORDER): ICD-10-CM

## 2024-10-18 PROCEDURE — 99214 OFFICE O/P EST MOD 30 MIN: CPT | Performed by: PHYSICIAN ASSISTANT

## 2024-10-18 RX ORDER — BUSPIRONE HYDROCHLORIDE 10 MG/1
10 TABLET ORAL 2 TIMES DAILY
Qty: 60 TABLET | Refills: 1 | Status: SHIPPED | OUTPATIENT
Start: 2024-10-18

## 2024-10-18 RX ORDER — BUSPIRONE HYDROCHLORIDE 10 MG/1
5 TABLET ORAL 2 TIMES DAILY
Qty: 60 TABLET | Refills: 1 | Status: SHIPPED | OUTPATIENT
Start: 2024-10-18 | End: 2024-10-18

## 2024-10-18 NOTE — PSYCH
This note was not shared with the patient due to reasonable likelihood of causing patient harm    Virtual Regular Visit    Visit Date: 10/18/24     Verification of patient location:    Patient is located at Home in the following state in which I hold an active license [place]    Reason for visit is   Chief Complaint   Patient presents with    Follow-up    Medication Management    Virtual Regular Visit     Encounter provider Aldo Topete    Provider located at [address]  [address]  [facility] [address] [phone]    Recent Visits  No visits were found meeting these conditions.  Showing recent visits within past 7 days and meeting all other requirements  Today's Visits  Date Type Provider Dept   10/18/24 Telemedicine Aldo Topete  [facility]   Showing today's visits and meeting all other requirements  Future Appointments  No visits were found meeting these conditions.  Showing future appointments within next 150 days and meeting all other requirements     The patient was identified by name and date of birth. Sweta Campbell was informed that this is a telemedicine visit and that the visit is being conducted throughthe Epic Embedded platform. She agrees to proceed.  My office door was closed. No one else was in the room. She acknowledged consent and understanding of privacy and security of the video platform. The patient has agreed to participate and understands they can discontinue the visit at any time.    Patient is aware this is a billable service.     Insurance: Payor: [employer] [place] / Plan: [employer] / Product Type: Blue Fee for Service /      Assessment & Plan  Major depressive disorder, recurrent, moderate (HCC)  Stable but not at goal - continue lamotrigine 100 mg qhs; increase buspirone to 10 mg BID; continue talk therapy (trazodone and lorazepam rx by PCP  "currently)  Generalized anxiety disorder  Stable but not at goal - continue lamotrigine 100 mg qhs; increase buspirone to 10 mg BID; continue talk therapy (trazodone and lorazepam rx by PCP currently)  PTSD (post-traumatic stress disorder)  Stable but not at goal - continue lamotrigine 100 mg qhs; increase buspirone to 10 mg BID; continue talk therapy (trazodone and lorazepam rx by PCP currently)      Subjective:    Sweta Campbell is a alina 49 y.o. female with a history of Major Depressive Disorder, Generalized Anxiety Disorder, and PTSD who presents today for follow-up and medication management. Since her last visit she reports continued stressors, though this time it is more her sister that is stressing her. She feels they are very \"co-dependent\" and she has trouble  herself from her sister's decisions and often lets them upset her. She does feel the increase in lamotrigine was helpful and it helps \"diffuse her\" but her anxiety has been higher lately due to stressors.     She denies any suicidal ideation, intent or plan at present; denies any homicidal ideation, intent or plan at present.    She denies any auditory hallucinations, denies any visual hallucinations, denies any delusions.    She denies any side effects from current psychiatric medications.    HPI ROS Appetite Changes and Sleep:     She reports adequate number of sleep hours, adequate appetite, decreased energy    Current Rating Scores:     None completed today.    Review Of Systems:    Mood anxiety and depression   Behavior appropriate, cooperative, and calm   Thought Content daily worries and negative thoughts   General emotional problems and decreased functioning   Personality no change in personality   Other Psych Symptoms normal   Constitutional as noted in HPI   ENT as noted in HPI   Cardiovascular as noted in HPI   Respiratory as noted in HPI   Gastrointestinal as noted in HPI   Genitourinary as noted in HPI   Musculoskeletal as " noted in HPI   Integumentary as noted in HPI   Neurological as noted in HPI   Endocrine negative   Other Symptoms none, all other systems are negative     Family Psychiatric History:     Family History   Problem Relation Age of Onset    Cancer Mother         Cervical    Cervical cancer Mother     Arthritis Mother     Heart attack Mother     Seizures Mother     Asthma Father     Arthritis Father     Heart attack Father     Migraines Sister     Migraines Brother     Breast cancer Cousin         age unknown     Social/Substance Abuse History:    Social History     Socioeconomic History    Marital status: Legally      Spouse name: Not on file    Number of children: Not on file    Years of education: Not on file    Highest education level: Not on file   Occupational History    Not on file   Tobacco Use    Smoking status: Never     Passive exposure: Never    Smokeless tobacco: Never   Vaping Use    Vaping status: Never Used   Substance and Sexual Activity    Alcohol use: Yes     Alcohol/week: 3.0 standard drinks of alcohol     Types: 3 Glasses of wine per week     Comment: social    Drug use: Yes     Types: Marijuana    Sexual activity: Yes     Partners: Male     Birth control/protection: Other, Surgical     Comment: Hysterectomy   Other Topics Concern    Not on file   Social History Narrative    Not on file     Social Determinants of Health     Financial Resource Strain: Not on file   Food Insecurity: No Food Insecurity (6/27/2023)    Hunger Vital Sign     Worried About Running Out of Food in the Last Year: Never true     Ran Out of Food in the Last Year: Never true   Transportation Needs: No Transportation Needs (6/27/2023)    PRAPARE - Transportation     Lack of Transportation (Medical): No     Lack of Transportation (Non-Medical): No   Physical Activity: Not on file   Stress: Not on file   Social Connections: Not on file   Intimate Partner Violence: Not on file   Housing Stability: Low Risk  (6/27/2023)     Housing Stability Vital Sign     Unable to Pay for Housing in the Last Year: No     Number of Places Lived in the Last Year: 1     Unstable Housing in the Last Year: No     The following portions of the patient's history were reviewed and updated as appropriate: past family history, past medical history, past social history, past surgical history and problem list.    OBJECTIVE:     Mental Status Evaluation:  Appearance:  dressed appropriately, adequate grooming, looks stated age   Behavior:  pleasant, cooperative, calm, interacts appropriately with this writer   Speech:  normal rate, normal volume, normal pitch   Mood:  depressed, anxious   Affect:  constricted   Thought Process:  organized, logical, coherent   Associations: intact associations   Thought Content:  no overt delusions, no paranoia noted on exam   Perceptual Disturbances: no auditory hallucinations, no visual hallucinations   Risk Potential: Suicidal ideation - None  Homicidal ideation - None  Potential for aggression - No   Sensorium:  oriented to person, place, and time/date   Memory:  recent and remote memory grossly intact   Consciousness:  alert and awake   Attention/Concentration: attention span and concentration are age appropriate   Insight:  age appropriate   Judgment: age appropriate   Gait/Station: Unable to assess today due to virtual visit   Motor Activity: unable to assess today due to virtual visit        Laboratory Results: I have personally reviewed all pertinent laboratory/tests results    Suicide/Homicide Risk Assessment:    Risk of Harm to Self:  The following ratings are based on assessment at the time of the interview  Based on today's assessmentSweta presents the following risk of harm to self: none    Risk of Harm to Others:  The following ratings are based on assessment at the time of the interview  Based on today's assessment, Sweta presents the following risk of harm to others: none    The following interventions are  recommended: no intervention changes needed     Assessment/Plan:      The increase in lamotrigine was helpful but the anxiety has increased so I have advised increasing the buspirone to 10 mg BID. PARQ completed including serotonin syndrome, rare TD/EPS, dizziness, sedation, GI distress, confusion, possible mood changes, xerostomia and visual disturbances. This can be increased further if needed. She will continue to work with Nayely Nuñez LCSW, as well. We have discussed her safety plan and she agrees that if she experience unsafe thoughts that she will reach out to her supports including this office, the suicide hotline, and emergency services if necessary. Sweta is aware of non-emergent and emergent mental health resources. They are able to contract for their own safety at this time.    Will follow up in 1 months. Patient is aware to call the office if questions or concerns arise sooner.      Diagnoses and all orders for this visit:    Major depressive disorder, recurrent, moderate (HCC)    Generalized anxiety disorder    PTSD (post-traumatic stress disorder)        Treatment Recommendations/Precautions:    Continue current medications:     - lamotrigine 100 mg qd    Increase medication:    - buspirone 5 mg BID to 10 mg BID    *lorazepam and trazodone currently rx by PCP    Aware of 24 hour and weekend coverage for urgent situations accessed by calling [facility] main practice number  Medication management every 1 month  Continue psychotherapy with [facility] therapist Nayely Nuñez  I am scheduling this patient out for greater than 3 months: No    Medications Risks/Benefits      Risks, Benefits And Possible Side Effects Of Medications:    Risks, benefits, and possible side effects of medications explained to Sweta and she verbalizes understanding and agreement for treatment.    Controlled Medication Discussion:     Not applicable - controlled prescriptions are not prescribed by  this practice    Psychotherapy Provided:     Individual psychotherapy provided: No     Treatment Plan:    Completed and signed during the session: Not applicable - Treatment Plan to be completed by [facility] therapist     Visit Time    Visit Start Time:  8:30 AM  Visit End Time:  8:45 AM  Total Visit Duration:  15 minutes    Aldo Topete 10/18/24      VIRTUAL VISIT DISCLAIMER    Sweta Campbell verbally agrees to participate in Virtual Care Services. Pt is aware that Virtual Care Services could be limited without vital signs or the ability to perform a full hands-on physical exam. Sweta Campbell understands she or the provider may request at any time to terminate the video visit and request the patient to seek care or treatment in person.

## 2024-10-18 NOTE — ASSESSMENT & PLAN NOTE
Stable but not at goal - continue lamotrigine 100 mg qhs; increase buspirone to 10 mg BID; continue talk therapy (trazodone and lorazepam rx by PCP currently)

## 2024-10-29 ENCOUNTER — TELEPHONE (OUTPATIENT)
Age: 49
End: 2024-10-29

## 2024-10-29 NOTE — TELEPHONE ENCOUNTER
Sweta called to change her appt for Nayely Nuñez to Virtual for Wed 10/30/24 at 9 am due to no transportation to office. Writer changed appt to Virtual.

## 2024-10-29 NOTE — TELEPHONE ENCOUNTER
Sweta called in regards to her Buspirone (Buspar) Medication Take 1 tablet (10 mg total) by mouth 2 (two) times a day. With this  increase of Buspar she believes she is having symptoms of eye twitching, teeth clenching and tension. She is wondering if it is because of the increase and wants to know if this will go away in time? Please advise.

## 2024-10-30 ENCOUNTER — TELEMEDICINE (OUTPATIENT)
Dept: BEHAVIORAL/MENTAL HEALTH CLINIC | Facility: CLINIC | Age: 49
End: 2024-10-30
Payer: COMMERCIAL

## 2024-10-30 DIAGNOSIS — F41.1 GENERALIZED ANXIETY DISORDER: ICD-10-CM

## 2024-10-30 DIAGNOSIS — F43.10 PTSD (POST-TRAUMATIC STRESS DISORDER): ICD-10-CM

## 2024-10-30 DIAGNOSIS — F33.1 MAJOR DEPRESSIVE DISORDER, RECURRENT, MODERATE (HCC): Primary | ICD-10-CM

## 2024-10-30 PROCEDURE — 90834 PSYTX W PT 45 MINUTES: CPT | Performed by: SOCIAL WORKER

## 2024-10-30 NOTE — TELEPHONE ENCOUNTER
Aldo Topete   to Me       10/29/24  4:31 PM  As long as it is tolerable she should stick with it, it will likely resolve. If it becomes too much she can reduce back to previous dose.

## 2024-10-30 NOTE — PSYCH
This note was not shared with the patient due to this is a psychotherapy note    Virtual Regular Visit    Verification of patient location:    Patient is located at Home in the following state in which I hold an active license [place]      Assessment/Plan:    Problem List Items Addressed This Visit       Generalized anxiety disorder    PTSD (post-traumatic stress disorder)    Major depressive disorder, recurrent, moderate (HCC) - Primary       Goals addressed in session: Goal 1 and Goal 2          Reason for visit is   Chief Complaint   Patient presents with    Virtual Regular Visit          Encounter provider Nayely Nuñez      Recent Visits  No visits were found meeting these conditions.  Showing recent visits within past 7 days and meeting all other requirements  Today's Visits  Date Type Provider Dept   10/30/24 Telemedicine [facility]ez [facility] Therapist [facility]   Showing today's visits and meeting all other requirements  Future Appointments  No visits were found meeting these conditions.  Showing future appointments within next 150 days and meeting all other requirements       The patient was identified by name and date of birth. Sweta Campbell was informed that this is a telemedicine visit and that the visit is being conducted throughthe Epic Embedded platform. She agrees to proceed..  My office door was closed. No one else was in the room.  She acknowledged consent and understanding of privacy and security of the video platform. The patient has agreed to participate and understands they can discontinue the visit at any time.    Patient is aware this is a billable service.     Behavioral Health Psychotherapy Progress Note    Psychotherapy Provided: Individual Psychotherapy     1. Major depressive disorder, recurrent, moderate (HCC)        2. Generalized anxiety disorder        3. PTSD (post-traumatic stress disorder)            Goals addressed in session: Goal 1 and Goal 2     DATA:  Writer met with  Sweta for an individual psychotherapy session. Sweta reported that she has not been sleeping well. Sweta shared that she is unsure if she is experiencing side effects from her recent medication increase as she has been experiencing increased agitation, trouble sleeping, anxious thoughts and eye twitching. Sweta reported that she did leave a message for her med provider yesterday and is waiting for a return call. Sweta went on to share that she was in a small argument with her significant other which they did resolve. Writer processed this with Sweta and explored how she could approach such a situation better in the future. Sweta shared that her son graduated from his trade school and she is having some trouble with letting him go. Writer and Sweta discussed how she can continue to be supportive of her son while also encouraging independence. Sweta shared that anxiety surrounding her sister has decreased. Writer actively listened as Sweta shared throughout session and assisted with exploring and processing thoughts and feelings. Writer offered emotional validation, utilized thought challenging and encouraged healthy coping and self-care.     During this session, this clinician used the following therapeutic modalities: Cognitive Behavioral Therapy and Supportive Psychotherapy    Substance Abuse was not addressed during this session. If the client is diagnosed with a co-occurring substance use disorder, please indicate any changes in the frequency or amount of use: NA. Stage of change for addressing substance use diagnoses: No substance use/Not applicable    ASSESSMENT:  Sweta Campbell presents with a Euthymic/ normal mood.     her affect is Normal range and intensity, which is congruent, with her mood and the content of the session. The client has made progress on their goals.    Sweta was oriented X3 and well engaged. Eye contact was good, speech was of normal  "rate and tone. Thought content was normal; insight and judgement were intact. Sweta Campbell presents with a none risk of suicide, none risk of self-harm, and none risk of harm to others.    For any risk assessment that surpasses a \"low\" rating, a safety plan must be developed.    A safety plan was indicated: no  If yes, describe in detail NA    PLAN: Between sessions, Sweta Campbell will \"focus on me.\" At the next session, the therapist will use Cognitive Behavioral Therapy and Supportive Psychotherapy to address stress/depression/anxiety.    Behavioral Health Treatment Plan and Discharge Planning: Sweta Campbell is aware of and agrees to continue to work on their treatment plan. They have identified and are working toward their discharge goals. yes    Visit start and stop times:    10/30/24  Start Time: 0900  Stop Time: 0945  Total Visit Time: 45 minutes    "

## 2024-10-30 NOTE — TELEPHONE ENCOUNTER
Late entry for 10/30/24 10:00:    Called and spoke with Sweta. Reviewed feedback/direction of Aldo. She verbalized understanding. She feels she can tolerate side effects until her next appointment, 11/20/24. Encouraged her to call with questions/concerns.

## 2024-11-07 ENCOUNTER — TELEPHONE (OUTPATIENT)
Age: 49
End: 2024-11-07

## 2024-11-07 DIAGNOSIS — F41.1 GENERALIZED ANXIETY DISORDER: ICD-10-CM

## 2024-11-07 DIAGNOSIS — F33.1 MAJOR DEPRESSIVE DISORDER, RECURRENT, MODERATE (HCC): ICD-10-CM

## 2024-11-07 DIAGNOSIS — F41.9 ANXIETY: ICD-10-CM

## 2024-11-07 RX ORDER — LAMOTRIGINE 100 MG/1
100 TABLET ORAL DAILY
Qty: 30 TABLET | Refills: 1 | Status: SHIPPED | OUTPATIENT
Start: 2024-11-07

## 2024-11-07 NOTE — TELEPHONE ENCOUNTER
Patient called in regard to msg she received on her phone. Patient wanted to confirm appt is still on. Writer confirmed appt for 11/13/24 at 9am.

## 2024-11-08 RX ORDER — TRAZODONE HYDROCHLORIDE 100 MG/1
100 TABLET ORAL DAILY
Qty: 30 TABLET | Refills: 3 | Status: SHIPPED | OUTPATIENT
Start: 2024-11-08 | End: 2025-05-07

## 2024-11-13 DIAGNOSIS — T78.40XA ALLERGY, INITIAL ENCOUNTER: ICD-10-CM

## 2024-11-14 RX ORDER — MONTELUKAST SODIUM 10 MG/1
10 TABLET ORAL DAILY
Qty: 90 TABLET | Refills: 0 | Status: SHIPPED | OUTPATIENT
Start: 2024-11-14

## 2024-11-20 ENCOUNTER — TELEMEDICINE (OUTPATIENT)
Dept: PSYCHIATRY | Facility: CLINIC | Age: 49
End: 2024-11-20
Payer: COMMERCIAL

## 2024-11-20 DIAGNOSIS — F43.10 PTSD (POST-TRAUMATIC STRESS DISORDER): ICD-10-CM

## 2024-11-20 DIAGNOSIS — F41.1 GENERALIZED ANXIETY DISORDER: ICD-10-CM

## 2024-11-20 DIAGNOSIS — F33.1 MAJOR DEPRESSIVE DISORDER, RECURRENT, MODERATE (HCC): Primary | ICD-10-CM

## 2024-11-20 PROCEDURE — 99214 OFFICE O/P EST MOD 30 MIN: CPT | Performed by: PHYSICIAN ASSISTANT

## 2024-11-20 NOTE — PSYCH
This note was not shared with the patient due to reasonable likelihood of causing patient harm    Virtual Regular Visit    Visit Date: 11/20/24     Verification of patient location:    Patient is located at Home in the following state in which I hold an active license [place]    Reason for visit is   Chief Complaint   Patient presents with    Follow-up    Medication Management    Virtual Regular Visit     Encounter provider Aldo Topete    Provider located at [address]  [address]  [facility] [id number] [phone]    Recent Visits  No visits were found meeting these conditions.  Showing recent visits within past 7 days and meeting all other requirements  Today's Visits  Date Type Provider Dept   11/20/24 Telemedicine Aldo Topete  [facility]   Showing today's visits and meeting all other requirements  Future Appointments  No visits were found meeting these conditions.  Showing future appointments within next 150 days and meeting all other requirements     The patient was identified by name and date of birth. Sweta Campbell was informed that this is a telemedicine visit and that the visit is being conducted throughthe Epic Embedded platform. She agrees to proceed.  My office door was closed. No one else was in the room. She acknowledged consent and understanding of privacy and security of the video platform. The patient has agreed to participate and understands they can discontinue the visit at any time.    Patient is aware this is a billable service.     Insurance: Payor: [employer] OF [place] / Plan: [employer]AYA [employer]MNIA / Product Type: Blue Fee for Service /      Assessment & Plan  Major depressive disorder, recurrent, moderate (HCC)  Not at goal - continue lamotrigine 100 mg qd, buspirone 10 mg BID; continue talk therapy; f/u 1 month  Generalized anxiety disorder  Not at goal - continue lamotrigine  "100 mg qd, buspirone 10 mg BID; continue talk therapy; f/u 1 month  PTSD (post-traumatic stress disorder)  Not at goal - continue lamotrigine 100 mg qd, buspirone 10 mg BID; continue talk therapy; f/u 1 month    Subjective:    Sweta Campbell is a alina 49 y.o. female with a history of Major Depressive Disorder, Generalized Anxiety Disorder, and PTSD who presents today for follow-up and medication management. Since her last visit she had to put down her dog, Lester, and she is taking it very hard. She is very tearful today. She is feeling much more sad and is getting very \"worked up\" so is needing the lorazepam more often so she can \"breathe\". She has not seen Nayely yet since this happened. She notes that she is not quite sure but she thinks the potential SE she was having with the buspirone increase have improved somewhat (she felt more agitated and was clenching her jaw more).     She denies any suicidal ideation, intent or plan at present; denies any homicidal ideation, intent or plan at present.    She denies any auditory hallucinations, denies any visual hallucinations, denies any delusions.    She denies any side effects from current psychiatric medications.    HPI ROS Appetite Changes and Sleep:     She reports adequate number of sleep hours, adequate appetite, decreased energy    Current Rating Scores:     None completed today.    Review Of Systems:    Mood anxiety and depression   Behavior appropriate and cooperative   Thought Content daily worries and negative thoughts   General emotional problems and decreased functioning   Personality no change in personality   Other Psych Symptoms normal   Constitutional as noted in HPI   ENT as noted in HPI   Cardiovascular as noted in HPI   Respiratory as noted in HPI   Gastrointestinal as noted in HPI   Genitourinary as noted in HPI   Musculoskeletal as noted in HPI   Integumentary as noted in HPI   Neurological as noted in HPI   Endocrine negative   Other " Symptoms none, all other systems are negative     Family Psychiatric History:     Family History   Problem Relation Age of Onset    Cancer Mother         Cervical    Cervical cancer Mother     Arthritis Mother     Heart attack Mother     Seizures Mother     Asthma Father     Arthritis Father     Heart attack Father     Migraines Sister     Migraines Brother     Breast cancer Cousin         age unknown     Social/Substance Abuse History:    Social History     Socioeconomic History    Marital status: Legally      Spouse name: Not on file    Number of children: Not on file    Years of education: Not on file    Highest education level: Not on file   Occupational History    Not on file   Tobacco Use    Smoking status: Never     Passive exposure: Never    Smokeless tobacco: Never   Vaping Use    Vaping status: Never Used   Substance and Sexual Activity    Alcohol use: Yes     Alcohol/week: 3.0 standard drinks of alcohol     Types: 3 Glasses of wine per week     Comment: social    Drug use: Yes     Types: Marijuana    Sexual activity: Yes     Partners: Male     Birth control/protection: Other, Surgical     Comment: Hysterectomy   Other Topics Concern    Not on file   Social History Narrative    Not on file     Social Drivers of Health     Financial Resource Strain: Not on file   Food Insecurity: No Food Insecurity (6/27/2023)    Hunger Vital Sign     Worried About Running Out of Food in the Last Year: Never true     Ran Out of Food in the Last Year: Never true   Transportation Needs: No Transportation Needs (6/27/2023)    PRAPARE - Transportation     Lack of Transportation (Medical): No     Lack of Transportation (Non-Medical): No   Physical Activity: Not on file   Stress: Not on file   Social Connections: Not on file   Intimate Partner Violence: Not on file   Housing Stability: Low Risk  (6/27/2023)    Housing Stability Vital Sign     Unable to Pay for Housing in the Last Year: No     Number of Places Lived in  the Last Year: 1     Unstable Housing in the Last Year: No     The following portions of the patient's history were reviewed and updated as appropriate: past family history, past medical history, past social history, past surgical history and problem list.    OBJECTIVE:     Mental Status Evaluation:  Appearance:  dressed appropriately, adequate grooming, looks stated age   Behavior:  pleasant, cooperative, interacts appropriately with this writer   Speech:  normal rate, normal volume, normal pitch   Mood:  depressed, anxious   Affect:  tearful, mood-congruent   Thought Process:  organized, logical, coherent   Associations: intact associations   Thought Content:  no overt delusions, no paranoia noted on exam   Perceptual Disturbances: no auditory hallucinations, no visual hallucinations   Risk Potential: Suicidal ideation - None  Homicidal ideation - None  Potential for aggression - No   Sensorium:  oriented to person, place, and time/date   Memory:  recent and remote memory grossly intact   Consciousness:  alert and awake   Attention/Concentration: attention span and concentration are age appropriate   Insight:  age appropriate   Judgment: age appropriate   Gait/Station: Unable to assess today due to virtual visit   Motor Activity: unable to assess today due to virtual visit        Laboratory Results: I have personally reviewed all pertinent laboratory/tests results    Suicide/Homicide Risk Assessment:    Risk of Harm to Self:  The following ratings are based on assessment at the time of the interview  Based on today's assessment, Sweta presents the following risk of harm to self: none    Risk of Harm to Others:  The following ratings are based on assessment at the time of the interview  Based on today's assessment, Sweta presents the following risk of harm to others: none    The following interventions are recommended: no intervention changes needed     Assessment/Plan:      She unfortunately had to put down  her dog, Lester, and is taking it very hard. She thinks she is okay to keep the meds unchanged at this point, but things can always be adjusted if needed. She will continue to work with Nayely Nuñez LCSW, as well. We have discussed her safety plan and she agrees that if she experience unsafe thoughts that she will reach out to her supports including this office, the suicide hotline, and emergency services if necessary. Sweta is aware of non-emergent and emergent mental health resources. They are able to contract for their own safety at this time.    Will follow up in 1 months. Patient is aware to call the office if questions or concerns arise sooner.      Diagnoses and all orders for this visit:    Major depressive disorder, recurrent, moderate (HCC)    Generalized anxiety disorder    PTSD (post-traumatic stress disorder)        Treatment Recommendations/Precautions:    Continue current medications:     - lamotrigine 100 mg qd    - buspirone 10 mg BID    *lorazepam and trazodone currently rx by PCP    Aware of 24 hour and weekend coverage for urgent situations accessed by calling [facility] main practice number  Medication management every 1 month  Continue psychotherapy with [facility] therapist Nayely Nuñez  I am scheduling this patient out for greater than 3 months: No    Medications Risks/Benefits      Risks, Benefits And Possible Side Effects Of Medications:    Risks, benefits, and possible side effects of medications explained to Sweta and she verbalizes understanding and agreement for treatment.    Controlled Medication Discussion:     Not applicable - controlled prescriptions are not prescribed by this practice    Psychotherapy Provided:     Individual psychotherapy provided: No     Treatment Plan:    Completed and signed during the session: Not applicable - Treatment Plan to be completed by [facility] therapist     Visit Time    Visit Start Time:  8:30  AM  Visit End Time:  8:45 AM  Total Visit Duration:  15 minutes    Aldo Topete 11/20/24      VIRTUAL VISIT DISCLAIMER    Sweta Campbell verbally agrees to participate in Virtual Care Services. Pt is aware that Virtual Care Services could be limited without vital signs or the ability to perform a full hands-on physical exam. Sweta Campbell understands she or the provider may request at any time to terminate the video visit and request the patient to seek care or treatment in person.

## 2024-11-20 NOTE — ASSESSMENT & PLAN NOTE
Not at goal - continue lamotrigine 100 mg qd, buspirone 10 mg BID; continue talk therapy; f/u 1 month

## 2024-11-22 ENCOUNTER — TELEPHONE (OUTPATIENT)
Age: 49
End: 2024-11-22

## 2024-11-22 ENCOUNTER — TELEPHONE (OUTPATIENT)
Dept: PSYCHIATRY | Facility: CLINIC | Age: 49
End: 2024-11-22

## 2024-11-22 NOTE — TELEPHONE ENCOUNTER
Patient came in office today 11/22/2024 and completed LÁZARO for [employer] Jury Juror # [id number].  (Scanned in media).  Patient stated she will  letter at the appointment on 11/27/2024.  Letter was done by provider and placed in Aldo Arellano PA-C provider mailbox for signature on letter. Patient did not have copy of summons with her.

## 2024-11-22 NOTE — TELEPHONE ENCOUNTER
Patient called office requesting a letter for Jury Duty. Writer informed patient LÁZARO needs to be complete before anything can be filled out. Writer asked for a call back from the provider to discuss.

## 2024-11-22 NOTE — LETTER
Date: 2024    Sweta Campbell   1975  Juror #: [id number]       To Whom It May Concern:    Sweta Campbell (: 1975) is a patient of mine at the [facility] outpatient clinic. Sweta has been under my care since 2024. Sweta has been diagnosed with MDD, KEKE, and PTSD. Sweta is coming to the office to receive medication management on a regular basis. Sweta is also receiving regularly scheduled outpatient individual psychotherapy as part of her treatment plan.    Due to her mental health symptomatology, Sweta is unable to participate in jury duty.       Sincerely,         Aldo Topete PA-C, [facility]    [facility]

## 2024-11-25 ENCOUNTER — TELEPHONE (OUTPATIENT)
Age: 49
End: 2024-11-25

## 2024-11-25 NOTE — TELEPHONE ENCOUNTER
Pt called to get her appt for 11/27 at 9am switched to a in office due to waiting to step outside home.  Writer switched appt.

## 2024-11-27 ENCOUNTER — SOCIAL WORK (OUTPATIENT)
Dept: BEHAVIORAL/MENTAL HEALTH CLINIC | Facility: CLINIC | Age: 49
End: 2024-11-27
Payer: COMMERCIAL

## 2024-11-27 DIAGNOSIS — F43.10 PTSD (POST-TRAUMATIC STRESS DISORDER): ICD-10-CM

## 2024-11-27 DIAGNOSIS — F33.1 MAJOR DEPRESSIVE DISORDER, RECURRENT, MODERATE (HCC): Primary | ICD-10-CM

## 2024-11-27 DIAGNOSIS — F41.1 GENERALIZED ANXIETY DISORDER: ICD-10-CM

## 2024-11-27 LAB — TSH SERPL DL<=0.005 MIU/L-ACNC: 2.35 UIU/ML (ref 0.45–4.5)

## 2024-11-27 PROCEDURE — 90834 PSYTX W PT 45 MINUTES: CPT | Performed by: SOCIAL WORKER

## 2024-11-27 NOTE — PSYCH
This note was not shared with the patient due to this is a psychotherapy note    Behavioral Health Psychotherapy Progress Note    Psychotherapy Provided: Individual Psychotherapy     1. Major depressive disorder, recurrent, moderate (HCC)        2. Generalized anxiety disorder        3. PTSD (post-traumatic stress disorder)            Goals addressed in session: Goal 1 and Goal 2     DATA: Writer met with Sweta for an individual psychotherapy session. Writer engaged Sweta with reviewing and updating her treatment plan. Sweta noted some progress she has made toward her goals, as well as work she would like to continue to do. Sweta reported that she had to put her dog down recently and this loss has  been difficult.  Sweta agreed that loss tends to bring out all kinds of grief. Sweta reported that she did write to express herself which was helpful. Sweta went on to share that she learned her son's girlfriend is pregnant. Sweta spent time sharing her thoughts and feelings about becoming a grandmother and how her son is responding to this unexpected pregnancy. Sweta reported that she is trying to allow her son some space to process his thoughts, but she worries about him. Sweta also discussed her job, her relationship with her significant other and her sister. Writer actively listened as Sweta shared and spent time processing thoughts and feelings. Writer offered emotional validation to Sweta and utilized some thought challenging. Writer and Sweta discussed use of boundaries, self-care and healthy coping to reduce stress, depression and anxiety.     During this session, this clinician used the following therapeutic modalities: Cognitive Behavioral Therapy and Supportive Psychotherapy    Substance Abuse was not addressed during this session. If the client is diagnosed with a co-occurring substance use disorder, please indicate any changes in the frequency or amount of use: NA. Stage  "of change for addressing substance use diagnoses: No substance use/Not applicable    ASSESSMENT:  Sweta Campbell presents with a Anxious mood.     her affect is Normal range and intensity and Tearful, which is congruent, with her mood and the content of the session. The client has made progress on their goals.    Sweta was oriented X3 and well engaged. Eye contact was good, speech was of normal rate and tone. Thought content was normal; insight and judgement were intact. Sweta Campbell presents with a none risk of suicide, none risk of self-harm, and none risk of harm to others.    For any risk assessment that surpasses a \"low\" rating, a safety plan must be developed.    A safety plan was indicated: no  If yes, describe in detail NA    PLAN: Between sessions, Sweta Campbell will utilize healthy boundaries in relationships, self-care and healthy coping. At the next session, the therapist will use Cognitive Behavioral Therapy and Supportive Psychotherapy to address stress/depression/anxiety/trauma.    Behavioral Health Treatment Plan and Discharge Planning: Sweta Campbell is aware of and agrees to continue to work on their treatment plan. They have identified and are working toward their discharge goals. yes    Visit start and stop times:    11/27/24  Start Time: 0900  Stop Time: 0950  Total Visit Time: 50 minutes  "
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
## 2024-11-29 ENCOUNTER — RESULTS FOLLOW-UP (OUTPATIENT)
Dept: FAMILY MEDICINE CLINIC | Facility: CLINIC | Age: 49
End: 2024-11-29

## 2024-12-08 DIAGNOSIS — E03.9 HYPOTHYROIDISM, UNSPECIFIED TYPE: ICD-10-CM

## 2024-12-08 DIAGNOSIS — I10 ESSENTIAL HYPERTENSION: ICD-10-CM

## 2024-12-10 RX ORDER — LISINOPRIL 5 MG/1
5 TABLET ORAL DAILY
Qty: 90 TABLET | Refills: 0 | Status: SHIPPED | OUTPATIENT
Start: 2024-12-10

## 2024-12-10 RX ORDER — LIOTHYRONINE SODIUM 5 UG/1
5 TABLET ORAL DAILY
Qty: 90 TABLET | Refills: 0 | Status: SHIPPED | OUTPATIENT
Start: 2024-12-10

## 2024-12-11 ENCOUNTER — SOCIAL WORK (OUTPATIENT)
Dept: BEHAVIORAL/MENTAL HEALTH CLINIC | Facility: CLINIC | Age: 49
End: 2024-12-11
Payer: COMMERCIAL

## 2024-12-11 DIAGNOSIS — F41.1 GENERALIZED ANXIETY DISORDER: ICD-10-CM

## 2024-12-11 DIAGNOSIS — F33.1 MAJOR DEPRESSIVE DISORDER, RECURRENT, MODERATE (HCC): Primary | ICD-10-CM

## 2024-12-11 DIAGNOSIS — F43.10 PTSD (POST-TRAUMATIC STRESS DISORDER): ICD-10-CM

## 2024-12-11 PROCEDURE — 90834 PSYTX W PT 45 MINUTES: CPT | Performed by: SOCIAL WORKER

## 2024-12-11 NOTE — PSYCH
"This note was not shared with the patient due to this is a psychotherapy note    Behavioral Health Psychotherapy Progress Note    Psychotherapy Provided: Individual Psychotherapy     1. Major depressive disorder, recurrent, moderate (HCC)        2. Generalized anxiety disorder        3. PTSD (post-traumatic stress disorder)            Goals addressed in session: Goal 1 and Goal 2     DATA: Writer met with Sweta for an individual psychotherapy session. Sweta reported that the past two weeks \"haven't been that bad.\" Sweta shared that Thanksgiving went well. Sweta shared that she is feeling less concerned for her son as she noticed him seeming less stressed. Sweta shared that her son has a job interview which she is also proud of. Sweta reported that she misses her daughter significantly and hopes to see her in the near future. Sweta reported that her relationship with her significant other has been \"status quo.\" Sweta reported that she has been feeling more spirited recently with the holidays approaching.  Sweta shared that her sister is also seeming to be maintaining sobriety which has kept things \"quiet.\" Sweta reported that she is making effort to do more for herself which she believes has been helpful. Writer actively listened as Sweta shared throughout session and assisted with processing thoughts and feelings. Writer recognized the positive nature of Sweta's report and emphasized the value of the healthy decisions she has been making which have contributed to her improved mood. Writer offered emotional validation and interactive feedback which included some thought challenging. Writer encouraged Sweta to continue with her efforts.     During this session, this clinician used the following therapeutic modalities: Cognitive Behavioral Therapy and Supportive Psychotherapy    Substance Abuse was not addressed during this session. If the client is diagnosed with a co-occurring " "substance use disorder, please indicate any changes in the frequency or amount of use: NA. Stage of change for addressing substance use diagnoses: No substance use/Not applicable    ASSESSMENT:  Sweta Campbell presents with a Euthymic/ normal mood.     her affect is Normal range and intensity, which is congruent, with her mood and the content of the session. The client has made progress on their goals.    Sweta was oriented X3 and engaged. Eye contact was good, speech was of normal rate and tone. Thought content was normal; insight and judgement were intact. Sweta Campbell presents with a none risk of suicide, none risk of self-harm, and none risk of harm to others.    For any risk assessment that surpasses a \"low\" rating, a safety plan must be developed.    A safety plan was indicated: no  If yes, describe in detail NA    PLAN: Between sessions, Sweta Campbell will focus on self-care and healthy coping to reduce any stressors that may occur. At the next session, the therapist will use Cognitive Behavioral Therapy and Supportive Psychotherapy to address stress/depression/anxiety/trauma.    Behavioral Health Treatment Plan and Discharge Planning: Sweta Campbell is aware of and agrees to continue to work on their treatment plan. They have identified and are working toward their discharge goals. yes    Depression Follow-up Plan Completed: Not applicable    Visit start and stop times:    12/11/24  Start Time: 0900  Stop Time: 0950  Total Visit Time: 50 minutes  "

## 2024-12-18 ENCOUNTER — TELEMEDICINE (OUTPATIENT)
Dept: PSYCHIATRY | Facility: CLINIC | Age: 49
End: 2024-12-18
Payer: COMMERCIAL

## 2024-12-18 DIAGNOSIS — F33.1 MAJOR DEPRESSIVE DISORDER, RECURRENT, MODERATE (HCC): Primary | ICD-10-CM

## 2024-12-18 DIAGNOSIS — F43.10 PTSD (POST-TRAUMATIC STRESS DISORDER): ICD-10-CM

## 2024-12-18 DIAGNOSIS — F41.1 GENERALIZED ANXIETY DISORDER: ICD-10-CM

## 2024-12-18 PROCEDURE — 99214 OFFICE O/P EST MOD 30 MIN: CPT | Performed by: PHYSICIAN ASSISTANT

## 2024-12-18 NOTE — ASSESSMENT & PLAN NOTE
Not at goal - increase buspirone to 15 mg BID; continue lamotrigine 100 mg qd; continue talk therapy; f/u in 1 month

## 2024-12-18 NOTE — PSYCH
This note was not shared with the patient due to reasonable likelihood of causing patient harm    Virtual Regular Visit    Visit Date: 12/18/24     Verification of patient location:    Patient is located at Home in the following state in which I hold an active license [place]    Reason for visit is   Chief Complaint   Patient presents with    Follow-up    Medication Management    Virtual Regular Visit     Encounter provider Aldo Topete    Provider located at [address]  [address]  [facility] [id number] [phone]    Recent Visits  No visits were found meeting these conditions.  Showing recent visits within past 7 days and meeting all other requirements  Today's Visits  Date Type Provider Dept   12/18/24 Telemedicine Aldo Topete  [facility]   Showing today's visits and meeting all other requirements  Future Appointments  No visits were found meeting these conditions.  Showing future appointments within next 150 days and meeting all other requirements     The patient was identified by name and date of birth. Sweta Campbell was informed that this is a telemedicine visit and that the visit is being conducted throughthe Epic Embedded platform. She agrees to proceed.  My office door was closed. No one else was in the room. She acknowledged consent and understanding of privacy and security of the video platform. The patient has agreed to participate and understands they can discontinue the visit at any time.    Patient is aware this is a billable service.     Insurance: Payor: [employer] [place] / Plan: ARMANDO MINAYA [employer]MNIA / Product Type: Blue Fee for Service /      Assessment & Plan  Major depressive disorder, recurrent, moderate (HCC)  Not at goal - increase buspirone to 15 mg BID; continue lamotrigine 100 mg qd; continue talk therapy; f/u in 1 month  Generalized anxiety disorder  Not at goal -  increase buspirone to 15 mg BID; continue lamotrigine 100 mg qd; continue talk therapy; f/u in 1 month  PTSD (post-traumatic stress disorder)  Not at goal - increase buspirone to 15 mg BID; continue lamotrigine 100 mg qd; continue talk therapy; f/u in 1 month    Subjective:    Sweta Campbell is a alina 49 y.o. female with a history of Major Depressive Disorder, Generalized Anxiety Disorder, and PTSD who presents today for follow-up and medication management. Since her last visit she has continued to struggle with depression and anxiety and has continued to have more situational stressors. She is still grieving her dog, her son moved out, and she and her long-term boyfriend may be ending their relationship. She also notes that she is wondering if ADHD can be part of the picture due to some concentration issues and her son being diagnosed and them having similar issues.     She denies any suicidal ideation, intent or plan at present; denies any homicidal ideation, intent or plan at present.    She denies any auditory hallucinations, denies any visual hallucinations, denies any delusions.    She denies any side effects from current psychiatric medications.    HPI ROS Appetite Changes and Sleep:     She reports adequate number of sleep hours, adequate appetite, decreased energy    Current Rating Scores:     None completed today.    Review Of Systems:    Mood anxiety and depression   Behavior appropriate and cooperative   Thought Content daily worries and negative thoughts   General emotional problems and decreased functioning   Personality no change in personality   Other Psych Symptoms normal   Constitutional as noted in HPI   ENT as noted in HPI   Cardiovascular as noted in HPI   Respiratory as noted in HPI   Gastrointestinal as noted in HPI   Genitourinary as noted in HPI   Musculoskeletal as noted in HPI   Integumentary as noted in HPI   Neurological as noted in HPI   Endocrine negative   Other Symptoms none,  all other systems are negative     Family Psychiatric History:     Family History   Problem Relation Age of Onset    Cancer Mother         Cervical    Cervical cancer Mother     Arthritis Mother     Heart attack Mother     Seizures Mother     Asthma Father     Arthritis Father     Heart attack Father     Migraines Sister     Migraines Brother     Breast cancer Cousin         age unknown     Social/Substance Abuse History:    Social History     Socioeconomic History    Marital status: Legally      Spouse name: Not on file    Number of children: Not on file    Years of education: Not on file    Highest education level: Not on file   Occupational History    Not on file   Tobacco Use    Smoking status: Never     Passive exposure: Never    Smokeless tobacco: Never   Vaping Use    Vaping status: Never Used   Substance and Sexual Activity    Alcohol use: Yes     Alcohol/week: 3.0 standard drinks of alcohol     Types: 3 Glasses of wine per week     Comment: social    Drug use: Yes     Types: Marijuana    Sexual activity: Yes     Partners: Male     Birth control/protection: Other, Surgical     Comment: Hysterectomy   Other Topics Concern    Not on file   Social History Narrative    Not on file     Social Drivers of Health     Financial Resource Strain: Not on file   Food Insecurity: No Food Insecurity (6/27/2023)    Hunger Vital Sign     Worried About Running Out of Food in the Last Year: Never true     Ran Out of Food in the Last Year: Never true   Transportation Needs: No Transportation Needs (6/27/2023)    PRAPARE - Transportation     Lack of Transportation (Medical): No     Lack of Transportation (Non-Medical): No   Physical Activity: Not on file   Stress: Not on file   Social Connections: Not on file   Intimate Partner Violence: Not on file   Housing Stability: Low Risk  (6/27/2023)    Housing Stability Vital Sign     Unable to Pay for Housing in the Last Year: No     Number of Places Lived in the Last Year: 1      Unstable Housing in the Last Year: No     The following portions of the patient's history were reviewed and updated as appropriate: past family history, past medical history, past social history, past surgical history and problem list.    OBJECTIVE:     Mental Status Evaluation:  Appearance:  dressed appropriately, adequate grooming, looks stated age   Behavior:  pleasant, cooperative, calm, interacts appropriately with this writer   Speech:  normal rate, normal volume, normal pitch   Mood:  depressed, anxious   Affect:  mood-congruent   Thought Process:  organized, logical, coherent   Associations: intact associations   Thought Content:  no overt delusions, no paranoia noted on exam   Perceptual Disturbances: no auditory hallucinations, no visual hallucinations   Risk Potential: Suicidal ideation - None  Homicidal ideation - None  Potential for aggression - No   Sensorium:  oriented to person, place, and time/date   Memory:  recent and remote memory grossly intact   Consciousness:  alert and awake   Attention/Concentration: attention span and concentration are age appropriate   Insight:  age appropriate   Judgment: age appropriate   Gait/Station: Unable to assess today due to virtual visit   Motor Activity: unable to assess today due to virtual visit        Laboratory Results: I have personally reviewed all pertinent laboratory/tests results    Suicide/Homicide Risk Assessment:    Risk of Harm to Self:  The following ratings are based on assessment at the time of the interview  Based on today's assessment, Sweta presents the following risk of harm to self: none    Risk of Harm to Others:  The following ratings are based on assessment at the time of the interview  Based on today's assessment, Sweat presents the following risk of harm to others: none    The following interventions are recommended: no intervention changes needed     Assessment/Plan:      She will continue to work with Nayely Nuñez LCSW,  as well. We have discussed her safety plan and she agrees that if she experience unsafe thoughts that she will reach out to her supports including this office, the suicide hotline, and emergency services if necessary. Sweta is aware of non-emergent and emergent mental health resources. They are able to contract for their own safety at this time.    Will follow up in 1 months. Patient is aware to call the office if questions or concerns arise sooner.      Diagnoses and all orders for this visit:    Major depressive disorder, recurrent, moderate (HCC)    Generalized anxiety disorder    PTSD (post-traumatic stress disorder)        Treatment Recommendations/Precautions:    Continue current medications:     - lamotrigine 100 mg qd    Increase medication:    - buspirone 10 mg BID to 15 mg BID    *lorazepam and trazodone currently rx by PCP    Aware of 24 hour and weekend coverage for urgent situations accessed by calling [facility] main practice number  Medication management every 1 month  Continue psychotherapy with [facility] therapist Nayely Nuñez  I am scheduling this patient out for greater than 3 months: No    Medications Risks/Benefits      Risks, Benefits And Possible Side Effects Of Medications:    Risks, benefits, and possible side effects of medications explained to Sweta and she verbalizes understanding and agreement for treatment.    Controlled Medication Discussion:     Not applicable - controlled prescriptions are not prescribed by this practice    Psychotherapy Provided:     Individual psychotherapy provided: No     Treatment Plan:    Completed and signed during the session: Not applicable - Treatment Plan to be completed by [facility] therapist     Visit Time    Visit Start Time:  8:30 AM  Visit End Time:  8:45 AM  Total Visit Duration:  15 minutes    Aldo Topete 12/18/24      VIRTUAL VISIT DISCLAIMER    Sweta Campbell verbally agrees to  participate in Virtual Care Services. Pt is aware that Virtual Care Services could be limited without vital signs or the ability to perform a full hands-on physical exam. Sweta Campbell understands she or the provider may request at any time to terminate the video visit and request the patient to seek care or treatment in person.

## 2024-12-19 ENCOUNTER — RESULTS FOLLOW-UP (OUTPATIENT)
Dept: FAMILY MEDICINE CLINIC | Facility: CLINIC | Age: 49
End: 2024-12-19

## 2024-12-19 ENCOUNTER — HOSPITAL ENCOUNTER (OUTPATIENT)
Dept: RADIOLOGY | Facility: HOSPITAL | Age: 49
Discharge: HOME | End: 2024-12-19
Attending: FAMILY MEDICINE
Payer: COMMERCIAL

## 2024-12-19 VITALS — WEIGHT: 165 LBS | HEIGHT: 63 IN | BODY MASS INDEX: 29.23 KG/M2

## 2024-12-19 DIAGNOSIS — Z12.31 ENCOUNTER FOR SCREENING MAMMOGRAM FOR HIGH-RISK PATIENT: ICD-10-CM

## 2024-12-19 PROCEDURE — 77063 BREAST TOMOSYNTHESIS BI: CPT

## 2024-12-19 PROCEDURE — 77067 SCR MAMMO BI INCL CAD: CPT

## 2025-01-03 ENCOUNTER — TELEPHONE (OUTPATIENT)
Dept: PSYCHIATRY | Facility: CLINIC | Age: 50
End: 2025-01-03

## 2025-01-03 DIAGNOSIS — F41.1 GENERALIZED ANXIETY DISORDER: ICD-10-CM

## 2025-01-03 RX ORDER — BUSPIRONE HYDROCHLORIDE 15 MG/1
15 TABLET ORAL 2 TIMES DAILY
Qty: 60 TABLET | Refills: 1 | Status: SHIPPED | OUTPATIENT
Start: 2025-01-03 | End: 2025-03-04

## 2025-01-03 NOTE — TELEPHONE ENCOUNTER
Patient called the RX Refill Line. Message is being forwarded to the office.     Patient states her buspirone was increased by cassandra to 15 mg 1 bid, Patient states she has been using the 10 mg tabs but needs a new script for 15 mg.    [facility]

## 2025-01-07 ENCOUNTER — TELEPHONE (OUTPATIENT)
Age: 50
End: 2025-01-07

## 2025-01-07 DIAGNOSIS — F41.1 GENERALIZED ANXIETY DISORDER: ICD-10-CM

## 2025-01-07 DIAGNOSIS — F33.1 MAJOR DEPRESSIVE DISORDER, RECURRENT, MODERATE (HCC): ICD-10-CM

## 2025-01-07 NOTE — TELEPHONE ENCOUNTER
Pt called to get her appt for 1/8 at 9 switched to a virtual visit due to patient.  Writer switched appt.

## 2025-01-08 ENCOUNTER — TELEMEDICINE (OUTPATIENT)
Dept: BEHAVIORAL/MENTAL HEALTH CLINIC | Facility: CLINIC | Age: 50
End: 2025-01-08
Payer: COMMERCIAL

## 2025-01-08 DIAGNOSIS — F33.1 MAJOR DEPRESSIVE DISORDER, RECURRENT, MODERATE (HCC): Primary | ICD-10-CM

## 2025-01-08 DIAGNOSIS — F43.10 PTSD (POST-TRAUMATIC STRESS DISORDER): ICD-10-CM

## 2025-01-08 DIAGNOSIS — F41.1 GENERALIZED ANXIETY DISORDER: ICD-10-CM

## 2025-01-08 PROCEDURE — 90834 PSYTX W PT 45 MINUTES: CPT | Performed by: SOCIAL WORKER

## 2025-01-08 RX ORDER — LAMOTRIGINE 100 MG/1
100 TABLET ORAL DAILY
Qty: 30 TABLET | Refills: 0 | Status: SHIPPED | OUTPATIENT
Start: 2025-01-08

## 2025-01-08 NOTE — PSYCH
This note was not shared with the patient due to this is a psychotherapy note    Virtual Regular Visit    Verification of patient location:    Patient is located at Home in the following state in which I hold an active license [place]      Assessment/Plan:    Problem List Items Addressed This Visit       Generalized anxiety disorder    PTSD (post-traumatic stress disorder)    Major depressive disorder, recurrent, moderate (HCC) - Primary       Goals addressed in session: Goal 1 and Goal 2     Depression Follow-up Plan Completed: Not applicable    Reason for visit is   Chief Complaint   Patient presents with    Virtual Regular Visit          Encounter provider Nayely Nuñez      Recent Visits  No visits were found meeting these conditions.  Showing recent visits within past 7 days and meeting all other requirements  Today's Visits  Date Type Provider Dept   01/08/25 Telemedicine [facility]ely Nu[facility]ez  [facility]   Showing today's visits and meeting all other requirements  Future Appointments  No visits were found meeting these conditions.  Showing future appointments within next 150 days and meeting all other requirements       The patient was identified by name and date of birth. Sweta Campbell was informed that this is a telemedicine visit and that the visit is being conducted throughthe Epic Embedded platform. She agrees to proceed..  My office door was closed. No one else was in the room.  She acknowledged consent and understanding of privacy and security of the video platform. The patient has agreed to participate and understands they can discontinue the visit at any time.    Patient is aware this is a billable service.     Behavioral Health Psychotherapy Progress Note    Psychotherapy Provided: Individual Psychotherapy     1. Major depressive disorder, recurrent, moderate (HCC)        2. Generalized anxiety disorder        3. PTSD (post-traumatic stress disorder)            Goals  addressed in session: Goal 1 and Goal 2     DATA: Writer met with Sweta for an individual psychotherapy session. Sweta was quickly tearful, reporting that the past few weeks have been difficult for her. Sweta shared that she has been feeling disconnected from her son and worries that his girlfriend does not like her. Sweta reported that she worries that she will try to keep her grandchild from her. Sweta reported that she is struggling with letting go of her son. Sweta identified that some of her feelings are irrational, anxious and paranoid. Sweta reported that she also worries about her daughter. Sweta shared that she wakes up and the anxious thoughts come in. Sweta reported that she was also disappointed with her job performance review as she did not get a raise - not because of her work but because of the company's financial challenges, she was told it would be revisited. Sweta continued to be tearful as she bounced around various topics. Writer actively listened and spent time exploring and processing thoughts and feelings. Writer assisted Sweta with recognizing that she is struggling to find a sense of identity now that her son has left the home (faster than she anticipated). Writer encouraged Sweta to take this time to focus on herself - something she is uncomfortable with. Writer and Sweta discussed the value of her finding happiness in life that is not dependent on her children. Writer offered emotional validation and interactive feedback. Writer offered thought challenging and encouraged Sweta to be mindful of anxious thoughts.     During this session, this clinician used the following therapeutic modalities: Cognitive Behavioral Therapy and Supportive Psychotherapy    Substance Abuse was not addressed during this session. If the client is diagnosed with a co-occurring substance use disorder, please indicate any changes in the frequency or amount of use: NA. Stage  "of change for addressing substance use diagnoses: No substance use/Not applicable    ASSESSMENT:  Sweta Campbell presents with a Depressed mood.     her affect is Normal range and intensity and Tearful, which is congruent, with her mood and the content of the session. The client has made progress on their goals.    Sweta was oriented X3 and engaged. Eye contact was good, speech was of normal rate and tone. Thought content was normal; insight and judgement were intact. Sweta Campbell presents with a none risk of suicide, none risk of self-harm, and none risk of harm to others.    For any risk assessment that surpasses a \"low\" rating, a safety plan must be developed.    A safety plan was indicated: no  If yes, describe in detail NA    PLAN: Between sessions, Sweta Campbell will focus on herself. At the next session, the therapist will use Cognitive Behavioral Therapy and Supportive Psychotherapy to address stress/depression/anxiety/trauma.    Behavioral Health Treatment Plan and Discharge Planning: Sweta Campbell is aware of and agrees to continue to work on their treatment plan. They have identified and are working toward their discharge goals. yes    Depression Follow-up Plan Completed: Not applicable    Visit start and stop times:    01/08/25  Start Time: 0900  Stop Time: 0945  Total Visit Time: 45 minutes      "

## 2025-01-14 ENCOUNTER — TELEPHONE (OUTPATIENT)
Age: 50
End: 2025-01-14

## 2025-01-14 DIAGNOSIS — E03.9 HYPOTHYROIDISM, UNSPECIFIED TYPE: Primary | ICD-10-CM

## 2025-01-14 NOTE — TELEPHONE ENCOUNTER
Patient returning a call. Reviewed above message with her. She verbalized understanding and stated she will let her PCP know, thank you.   no syncope/no tremors/no vertigo/no loss of sensation/no headache

## 2025-01-14 NOTE — TELEPHONE ENCOUNTER
Patient calling in, states she is a new patient and will be seen 3/18/25 for a dx of hypothyroidism. States PCP has ordered a TSH to be done for the new patient Appointment, but would like to know if office would like any other labs done on patient prior to new pt appointment? Patient is asking for a call back, please advise.

## 2025-01-14 NOTE — TELEPHONE ENCOUNTER
Outgoing Call To: Patient      Doctor: Dr. Wynn      Reason for call: I tried calling back the patient to inform her that our provided would not put in any lab order for a New Patient. The day of the visit the Doctor would put in her lab orders.      Number:  [phone]

## 2025-01-15 ENCOUNTER — TELEMEDICINE (OUTPATIENT)
Dept: PSYCHIATRY | Facility: CLINIC | Age: 50
End: 2025-01-15
Payer: COMMERCIAL

## 2025-01-15 DIAGNOSIS — F33.1 MAJOR DEPRESSIVE DISORDER, RECURRENT, MODERATE (HCC): Primary | ICD-10-CM

## 2025-01-15 DIAGNOSIS — F41.1 GENERALIZED ANXIETY DISORDER: ICD-10-CM

## 2025-01-15 DIAGNOSIS — F43.10 PTSD (POST-TRAUMATIC STRESS DISORDER): ICD-10-CM

## 2025-01-15 PROCEDURE — 99214 OFFICE O/P EST MOD 30 MIN: CPT | Performed by: PHYSICIAN ASSISTANT

## 2025-01-15 RX ORDER — CLONIDINE HYDROCHLORIDE 0.1 MG/1
0.1 TABLET ORAL
Qty: 30 TABLET | Refills: 1 | Status: SHIPPED | OUTPATIENT
Start: 2025-01-15 | End: 2025-03-16

## 2025-01-15 NOTE — PSYCH
This note was not shared with the patient due to reasonable likelihood of causing patient harm    Virtual Regular Visit    Visit Date: 01/15/25     Verification of patient location:    Patient is located at Home in the following state in which I hold an active license [place]    Reason for visit is   Chief Complaint   Patient presents with    Virtual Regular Visit     Encounter provider Aldo Topete    Provider located at [address]  [address]  [facility] [id number] [phone]    Recent Visits  No visits were found meeting these conditions.  Showing recent visits within past 7 days and meeting all other requirements  Today's Visits  Date Type Provider Dept   01/15/25 Telemedicine Aldo Topete [facility]   Showing today's visits and meeting all other requirements  Future Appointments  No visits were found meeting these conditions.  Showing future appointments within next 150 days and meeting all other requirements     The patient was identified by name and date of birth. Sweta Campbell was informed that this is a telemedicine visit and that the visit is being conducted throughthe Epic Embedded platform. She agrees to proceed.  My office door was closed. No one else was in the room. She acknowledged consent and understanding of privacy and security of the video platform. The patient has agreed to participate and understands they can discontinue the visit at any time.    Patient is aware this is a billable service.     Insurance: Payor: [employer] [place] / Plan: ARMANDO MCINTOSHIA / Product Type: Blue Fee for Service /      Assessment & Plan  Major depressive disorder, recurrent, moderate (HCC)  Not at goal - continue lamotrigine 100 mg qd, buspirone 15 mg BID; continue talk therapy; f/u in 1 month  Generalized anxiety disorder  Not at goal - start clonidine 0.1 mg qhs (will take daily BP/HR x 2  weeks); continue lamotrigine 100 mg qd, buspirone 15 mg BID; continue talk therapy; f/u in 1 month  PTSD (post-traumatic stress disorder)  Not at goal - start clonidine 0.1 mg qhs (will take daily BP/HR x 2 weeks); continue lamotrigine 100 mg qd, buspirone 15 mg BID; continue talk therapy; f/u in 1 month    Subjective:    Sweta Campbell is a alina 49 y.o. female with a history of Major Depressive Disorder, Generalized Anxiety Disorder, and PTSD who presents today for follow-up and medication management. Since her last visit she increased the buspirone and is tolerating it well and feels it has been somewhat helpful. Overall she still feels depressed and anxious but she feels it is less intense than previously. She is struggling more with sleep, even with the trazodone. She is thinking about getting a new dog but is not sure if it is the right choice.     She denies any suicidal ideation, intent or plan at present; denies any homicidal ideation, intent or plan at present.    She denies any auditory hallucinations, denies any visual hallucinations, denies any delusions.    She denies any side effects from current psychiatric medications.    HPI ROS Appetite Changes and Sleep:     She reports adequate number of sleep hours, adequate appetite, decreased energy    Current Rating Scores:     None completed today.    Review Of Systems:    Mood anxiety and depression   Behavior appropriate, cooperative, and calm   Thought Content daily worries and negative thoughts   General emotional problems, sleep disturbances, and decreased functioning   Personality no change in personality   Other Psych Symptoms normal   Constitutional as noted in HPI   ENT as noted in HPI   Cardiovascular as noted in HPI   Respiratory as noted in HPI   Gastrointestinal as noted in HPI   Genitourinary as noted in HPI   Musculoskeletal as noted in HPI   Integumentary as noted in HPI   Neurological as noted in HPI   Endocrine negative   Other  Symptoms none, all other systems are negative     Family Psychiatric History:     Family History   Problem Relation Age of Onset    Cancer Mother         Cervical    Cervical cancer Mother     Arthritis Mother     Heart attack Mother     Seizures Mother     Asthma Father     Arthritis Father     Heart attack Father     Migraines Sister     Migraines Brother     Breast cancer Cousin         age unknown     Social/Substance Abuse History:    Social History     Socioeconomic History    Marital status: Legally      Spouse name: Not on file    Number of children: Not on file    Years of education: Not on file    Highest education level: Not on file   Occupational History    Not on file   Tobacco Use    Smoking status: Never     Passive exposure: Never    Smokeless tobacco: Never   Vaping Use    Vaping status: Never Used   Substance and Sexual Activity    Alcohol use: Yes     Alcohol/week: 3.0 standard drinks of alcohol     Types: 3 Glasses of wine per week     Comment: social    Drug use: Yes     Types: Marijuana    Sexual activity: Yes     Partners: Male     Birth control/protection: Other, Surgical     Comment: Hysterectomy   Other Topics Concern    Not on file   Social History Narrative    Not on file     Social Drivers of Health     Financial Resource Strain: Not on file   Food Insecurity: No Food Insecurity (6/27/2023)    Hunger Vital Sign     Worried About Running Out of Food in the Last Year: Never true     Ran Out of Food in the Last Year: Never true   Transportation Needs: No Transportation Needs (6/27/2023)    PRAPARE - Transportation     Lack of Transportation (Medical): No     Lack of Transportation (Non-Medical): No   Physical Activity: Not on file   Stress: Not on file   Social Connections: Not on file   Intimate Partner Violence: Not on file   Housing Stability: Low Risk  (6/27/2023)    Housing Stability Vital Sign     Unable to Pay for Housing in the Last Year: No     Number of Places Lived in  the Last Year: 1     Unstable Housing in the Last Year: No     The following portions of the patient's history were reviewed and updated as appropriate: past family history, past medical history, past social history, past surgical history and problem list.    OBJECTIVE:     Mental Status Evaluation:  Appearance:  dressed appropriately, adequate grooming, looks stated age   Behavior:  pleasant, cooperative, calm, interacts appropriately with this writer   Speech:  normal rate, normal volume, normal pitch   Mood:  slightly less anxious, slightly less depressed   Affect:  mood-congruent   Thought Process:  organized, logical, coherent   Associations: intact associations   Thought Content:  no overt delusions, no paranoia noted on exam   Perceptual Disturbances: no auditory hallucinations, no visual hallucinations   Risk Potential: Suicidal ideation - None  Homicidal ideation - None  Potential for aggression - No   Sensorium:  oriented to person, place, and time/date   Memory:  recent and remote memory grossly intact   Consciousness:  alert and awake   Attention/Concentration: attention span and concentration are age appropriate   Insight:  age appropriate   Judgment: age appropriate   Gait/Station: Unable to assess today due to virtual visit   Motor Activity: unable to assess today due to virtual visit   Video lost part way through visit, audio maintained throughout     Laboratory Results: I have personally reviewed all pertinent laboratory/tests results    Suicide/Homicide Risk Assessment:    Risk of Harm to Self:  The following ratings are based on assessment at the time of the interview  Based on today's assessment, Sweta presents the following risk of harm to self: none    Risk of Harm to Others:  The following ratings are based on assessment at the time of the interview  Based on today's assessment, Sweta presents the following risk of harm to others: none    The following interventions are recommended: no  intervention changes needed     Assessment/Plan:      She feels the increased dose of buspirone has been helpful but she has been struggling more with sleep and is still struggling with focus. Advised trying clonidine 0.1 mg qhs to help with sleep, anxiety, daytime ADHD sx. PARQ completed including sedation, headache, dizziness, hypotension/postural hypotension, and risks associated with sudden discontinuation, among others. She will continue to work with Nayely Nuñez LCSW, as well. We have discussed her safety plan and she agrees that if she experience unsafe thoughts that she will reach out to her supports including this office, the suicide hotline, and emergency services if necessary. Sweta is aware of non-emergent and emergent mental health resources. They are able to contract for their own safety at this time.    Will follow up in 1 months. Patient is aware to call the office if questions or concerns arise sooner.      Diagnoses and all orders for this visit:    Major depressive disorder, recurrent, moderate (HCC)    Generalized anxiety disorder  -     cloNIDine (Catapres) 0.1 mg tablet; Take 1 tablet (0.1 mg total) by mouth daily at bedtime    PTSD (post-traumatic stress disorder)  -     cloNIDine (Catapres) 0.1 mg tablet; Take 1 tablet (0.1 mg total) by mouth daily at bedtime          Treatment Recommendations/Precautions:    Continue current medications:     - lamotrigine 100 mg qd    - buspirone 15 mg BID    Start new medication:    - clonidine 0.1 mg qhs    *lorazepam and trazodone currently rx by PCP    Aware of 24 hour and weekend coverage for urgent situations accessed by calling [facility] main practice number  Medication management every 1 month  Continue psychotherapy with [facility] therapist Nayely Nuñez  I am scheduling this patient out for greater than 3 months: No    Medications Risks/Benefits      Risks, Benefits And Possible Side Effects Of Medications:    Risks,  benefits, and possible side effects of medications explained to Sweta and she verbalizes understanding and agreement for treatment.    Controlled Medication Discussion:     Not applicable - controlled prescriptions are not prescribed by this practice    Psychotherapy Provided:     Individual psychotherapy provided: No     Treatment Plan:    Completed and signed during the session: Not applicable - Treatment Plan to be completed by [facility] therapist     Visit Time    Visit Start Time:  8:30 AM  Visit End Time:  8:50 AM  Total Visit Duration:  20 minutes    Aldo Topete 01/15/25      VIRTUAL VISIT DISCLAIMER    Sweta Campbell verbally agrees to participate in Virtual Care Services. Pt is aware that Virtual Care Services could be limited without vital signs or the ability to perform a full hands-on physical exam. Sweta Campbell understands she or the provider may request at any time to terminate the video visit and request the patient to seek care or treatment in person.

## 2025-01-15 NOTE — ASSESSMENT & PLAN NOTE
Not at goal - continue lamotrigine 100 mg qd, buspirone 15 mg BID; continue talk therapy; f/u in 1 month

## 2025-01-15 NOTE — ASSESSMENT & PLAN NOTE
Not at goal - start clonidine 0.1 mg qhs (will take daily BP/HR x 2 weeks); continue lamotrigine 100 mg qd, buspirone 15 mg BID; continue talk therapy; f/u in 1 month

## 2025-01-22 ENCOUNTER — SOCIAL WORK (OUTPATIENT)
Dept: BEHAVIORAL/MENTAL HEALTH CLINIC | Facility: CLINIC | Age: 50
End: 2025-01-22
Payer: COMMERCIAL

## 2025-01-22 DIAGNOSIS — F33.1 MAJOR DEPRESSIVE DISORDER, RECURRENT, MODERATE (HCC): Primary | ICD-10-CM

## 2025-01-22 DIAGNOSIS — F41.1 GENERALIZED ANXIETY DISORDER: ICD-10-CM

## 2025-01-22 DIAGNOSIS — F43.10 PTSD (POST-TRAUMATIC STRESS DISORDER): ICD-10-CM

## 2025-01-22 PROCEDURE — 90834 PSYTX W PT 45 MINUTES: CPT | Performed by: SOCIAL WORKER

## 2025-01-22 NOTE — PSYCH
"This note was not shared with the patient due to this is a psychotherapy note    Behavioral Health Psychotherapy Progress Note    Psychotherapy Provided: Individual Psychotherapy     1. Major depressive disorder, recurrent, moderate (HCC)        2. Generalized anxiety disorder        3. PTSD (post-traumatic stress disorder)            Goals addressed in session: Goal 1 and Goal 2     DATA: Writer met with Sweta for an individual psychotherapy session. Sweta reported that she has been struggling. Sweta spent time sharing about interactions with her son and his girlfriend who is pregnant. Sweta expressed thoughts that his girlfriend does not like her and will ultimately keep their baby from her once it is born. Sweta reported that she worries for her son. Sweta also expressed worry for her daughter. Sweta was tearful as she expressed her thoughts and feelings about being an \"empty taylor\" and the difficult she is having with letting go of her children knowing the dangers of the world. Sweta reported that she calls herself a weeping willow because she cries off and on. Sweta reported that she is considering getting a dog as it would be a positive distraction for her. Sweta also discussed ideas of traveling to visit family, and other small goals she has in mind. Writer actively listened as Sweta shared and spent time exploring and processing thoughts and feelings. Writer offered emotional validation and provided interactive feedback. Writer normalized Sweta's emotions surrounding her children no longer living in her home and needing her like they once did. Writer redirected Sweta to focus on herself at this time. Writer utilized thought challenging throughout session.     During this session, this clinician used the following therapeutic modalities: Cognitive Behavioral Therapy and Supportive Psychotherapy    Substance Abuse was not addressed during this session. If the client is " "diagnosed with a co-occurring substance use disorder, please indicate any changes in the frequency or amount of use: NA. Stage of change for addressing substance use diagnoses: No substance use/Not applicable    ASSESSMENT:  Sweta Campbell presents with a Depressed mood.     her affect is Normal range and intensity and Tearful, which is congruent, with her mood and the content of the session. The client has made progress on their goals.    Sweta was oriented X3 and engaged. Eye contact was good, speech was of normal rate and tone. Thought content was normal; insight and judgement were intact. Sweta Campbell presents with a none risk of suicide, none risk of self-harm, and none risk of harm to others.    For any risk assessment that surpasses a \"low\" rating, a safety plan must be developed.    A safety plan was indicated: no  If yes, describe in detail NA    PLAN: Between sessions, Sweta Campbell will focus on self-care and positive distraction. At the next session, the therapist will use Cognitive Behavioral Therapy and Supportive Psychotherapy to address stress/depression/anxiety.    Behavioral Health Treatment Plan and Discharge Planning: Sweta Campbell is aware of and agrees to continue to work on their treatment plan. They have identified and are working toward their discharge goals. yes    Depression Follow-up Plan Completed: Not applicable    Visit start and stop times:    01/22/25  Start Time: 0910  Stop Time: 0954  Total Visit Time: 44 minutes  "

## 2025-01-28 DIAGNOSIS — F41.9 ANXIETY: ICD-10-CM

## 2025-01-28 RX ORDER — LORAZEPAM 1 MG/1
1 TABLET ORAL DAILY PRN
Qty: 30 TABLET | Refills: 0 | Status: SHIPPED | OUTPATIENT
Start: 2025-01-28

## 2025-01-31 ENCOUNTER — TELEPHONE (OUTPATIENT)
Age: 50
End: 2025-01-31

## 2025-01-31 NOTE — TELEPHONE ENCOUNTER
Patient called in looking for a sooner talk therapy appointment.    Writer researched and was unable to find an available appointment.    Patient is scheduled on 2/5 @9am and will be keeping that appointment.

## 2025-02-05 ENCOUNTER — SOCIAL WORK (OUTPATIENT)
Dept: BEHAVIORAL/MENTAL HEALTH CLINIC | Facility: CLINIC | Age: 50
End: 2025-02-05
Payer: COMMERCIAL

## 2025-02-05 DIAGNOSIS — F33.1 MAJOR DEPRESSIVE DISORDER, RECURRENT, MODERATE (HCC): Primary | ICD-10-CM

## 2025-02-05 DIAGNOSIS — F43.10 PTSD (POST-TRAUMATIC STRESS DISORDER): ICD-10-CM

## 2025-02-05 DIAGNOSIS — F41.1 GENERALIZED ANXIETY DISORDER: ICD-10-CM

## 2025-02-05 PROCEDURE — 90834 PSYTX W PT 45 MINUTES: CPT | Performed by: SOCIAL WORKER

## 2025-02-05 NOTE — PSYCH
This note was not shared with the patient due to this is a psychotherapy note    Behavioral Health Psychotherapy Progress Note    Psychotherapy Provided: Individual Psychotherapy     1. Major depressive disorder, recurrent, moderate (HCC)        2. Generalized anxiety disorder        3. PTSD (post-traumatic stress disorder)            Goals addressed in session: Goal 1 and Goal 2     DATA: Writer met with Sweta for an individual psychotherapy session. Sweta reported that she and her significant other have decided to separate. Sweta spent time sharing about events that took place that led to this decision. Sweta shared that he has yet to move out but will be in the very near future. Sweta verbalized her thoughts and feelings about this decision which included anger, hurt, relief, grief. Writer offered emotional validation. Sweta expressed stress and concern about her finances and decisions she now has to make regarding her home as she is unsure if she can afford it on her own. Sweta shared that she has been receiving support from friends and family. Writer actively listened as Sweta shared throughout session and spent time processing thoughts and feelings. Writer provided interactive feedback to Sweta and recognized the challenging nature of this change in her life. Writer empowered Sweta to utilize this time to make healthy decisions and changes as she has been considering this decision for some time. Writer encouraged self-care and healthy coping to assist her through her various emotions.     During this session, this clinician used the following therapeutic modalities: Cognitive Behavioral Therapy and Supportive Psychotherapy    Substance Abuse was not addressed during this session. If the client is diagnosed with a co-occurring substance use disorder, please indicate any changes in the frequency or amount of use: NA. Stage of change for addressing substance use diagnoses: No  "substance use/Not applicable    ASSESSMENT:  Sweta Campbell presents with a Depressed mood.     her affect is Normal range and intensity and Tearful, which is congruent, with her mood and the content of the session. The client has made progress on their goals.    Sweta was oriented X3 and well engaged. Eye contact was good, speech was of normal rate and tone. Thought content was normal; insight and judgement were intact. Sweta Campbell presents with a none risk of suicide, none risk of self-harm, and none risk of harm to others.    For any risk assessment that surpasses a \"low\" rating, a safety plan must be developed.    A safety plan was indicated: no  If yes, describe in detail NA    PLAN: Between sessions, Sweta Campbell will focus on having her car repaired and figuring out finances. At the next session, the therapist will use Cognitive Behavioral Therapy and Supportive Psychotherapy to address stress/depression/anxiety/trauma.    Behavioral Health Treatment Plan and Discharge Planning: Sweta Campbell is aware of and agrees to continue to work on their treatment plan. They have identified and are working toward their discharge goals. yes    Depression Follow-up Plan Completed: Not applicable    Visit start and stop times:    02/05/25  Start Time: 0903  Stop Time: 0954  Total Visit Time: 51 minutes  "

## 2025-02-06 DIAGNOSIS — F41.1 GENERALIZED ANXIETY DISORDER: ICD-10-CM

## 2025-02-06 DIAGNOSIS — F33.1 MAJOR DEPRESSIVE DISORDER, RECURRENT, MODERATE (HCC): ICD-10-CM

## 2025-02-06 RX ORDER — LAMOTRIGINE 100 MG/1
100 TABLET ORAL DAILY
Qty: 30 TABLET | Refills: 0 | Status: SHIPPED | OUTPATIENT
Start: 2025-02-06

## 2025-02-13 DIAGNOSIS — T78.40XA ALLERGY, INITIAL ENCOUNTER: ICD-10-CM

## 2025-02-13 RX ORDER — MONTELUKAST SODIUM 10 MG/1
10 TABLET ORAL DAILY
Qty: 90 TABLET | Refills: 0 | Status: SHIPPED | OUTPATIENT
Start: 2025-02-13

## 2025-02-14 ENCOUNTER — TELEMEDICINE (OUTPATIENT)
Dept: PSYCHIATRY | Facility: CLINIC | Age: 50
End: 2025-02-14
Payer: COMMERCIAL

## 2025-02-14 DIAGNOSIS — F43.10 PTSD (POST-TRAUMATIC STRESS DISORDER): ICD-10-CM

## 2025-02-14 DIAGNOSIS — F41.1 GENERALIZED ANXIETY DISORDER: ICD-10-CM

## 2025-02-14 DIAGNOSIS — F33.1 MAJOR DEPRESSIVE DISORDER, RECURRENT, MODERATE (HCC): Primary | ICD-10-CM

## 2025-02-14 PROCEDURE — 99214 OFFICE O/P EST MOD 30 MIN: CPT | Performed by: PHYSICIAN ASSISTANT

## 2025-02-14 NOTE — ASSESSMENT & PLAN NOTE
Not at goal - continue lamotrigine 100 mg qd, buspirone 15 mg BID; continue with talk therapy; f/u in 1 month

## 2025-02-14 NOTE — PSYCH
This note was not shared with the patient due to reasonable likelihood of causing patient harm    Virtual Regular Visit    Visit Date: 02/14/25     Verification of patient location:    Patient is located at Home in the following state in which I hold an active license [place]    Reason for visit is   Chief Complaint   Patient presents with    Virtual Regular Visit    Follow-up    Medication Management     Encounter provider Aldo Topete    Provider located at [address]  [address]  [facility] [id number] [phone]    Recent Visits  No visits were found meeting these conditions.  Showing recent visits within past 7 days and meeting all other requirements  Today's Visits  Date Type Provider Dept   02/14/25 Telemedicine Aldo Topete  [facility]   Showing today's visits and meeting all other requirements  Future Appointments  No visits were found meeting these conditions.  Showing future appointments within next 150 days and meeting all other requirements     The patient was identified by name and date of birth. Sweta Campbell was informed that this is a telemedicine visit and that the visit is being conducted throughthe Epic Embedded platform. She agrees to proceed.  My office door was closed. No one else was in the room. She acknowledged consent and understanding of privacy and security of the video platform. The patient has agreed to participate and understands they can discontinue the visit at any time.    Patient is aware this is a billable service.     Insurance: Payor: [employer] OF [place] / Plan: [employer] APO[employer]VA MINAYA OMNIA / Product Type: Blue Fee for Service /      Assessment & Plan  Major depressive disorder, recurrent, moderate (HCC)  Not at goal - continue lamotrigine 100 mg qd, buspirone 15 mg BID; continue with talk therapy; f/u in 1 month  Generalized anxiety disorder  Not at goal - continue  "lamotrigine 100 mg qd, buspirone 15 mg BID, clonidine 0.1 mg qhs; continue with talk therapy; f/u in 1 month  PTSD (post-traumatic stress disorder)  Not at goal - continue lamotrigine 100 mg qd, buspirone 15 mg BID, clonidine 0.1 mg qhs; continue with talk therapy; f/u in 1 month    Subjective:    Sweta Campbell is a alina 49 y.o. female with a history of Major Depressive Disorder, Generalized Anxiety Disorder, and PTSD who presents today for follow-up and medication management. Since her last visit she has had increased stressors and is feeling more anxious and down. She is \"holding it together, but barely\". She does note the clonidine did help improve sleep and she is tolerating it well, though she isn't sure if it is helping with anxiety or focus yet. She and her boyfriend decided mutually to part ways and they are both moving out so she can sell the house. She is going to be moving in with a friend. She is still struggling with the \"empty nesting\".     She denies any suicidal ideation, intent or plan at present; denies any homicidal ideation, intent or plan at present.    She denies any auditory hallucinations, denies any visual hallucinations, denies any delusions.    She denies any side effects from current psychiatric medications.    HPI ROS Appetite Changes and Sleep:     She reports adequate number of sleep hours, adequate appetite, decreased energy    Current Rating Scores:     None completed today.    Review Of Systems:    Mood anxiety and depression   Behavior appropriate, cooperative, and calm   Thought Content daily worries and negative thoughts   General emotional problems and decreased functioning   Personality no change in personality   Other Psych Symptoms normal   Constitutional as noted in HPI   ENT as noted in HPI   Cardiovascular as noted in HPI   Respiratory as noted in HPI   Gastrointestinal as noted in HPI   Genitourinary as noted in HPI   Musculoskeletal as noted in HPI   Integumentary " as noted in HPI   Neurological as noted in HPI   Endocrine negative   Other Symptoms none, all other systems are negative     Family Psychiatric History:     Family History   Problem Relation Age of Onset    Cancer Mother         Cervical    Cervical cancer Mother     Arthritis Mother     Heart attack Mother     Seizures Mother     Asthma Father     Arthritis Father     Heart attack Father     Migraines Sister     Migraines Brother     Breast cancer Cousin         age unknown     Social/Substance Abuse History:    Social History     Socioeconomic History    Marital status: Legally      Spouse name: Not on file    Number of children: Not on file    Years of education: Not on file    Highest education level: Not on file   Occupational History    Not on file   Tobacco Use    Smoking status: Never     Passive exposure: Never    Smokeless tobacco: Never   Vaping Use    Vaping status: Never Used   Substance and Sexual Activity    Alcohol use: Yes     Alcohol/week: 3.0 standard drinks of alcohol     Types: 3 Glasses of wine per week     Comment: social    Drug use: Yes     Types: Marijuana    Sexual activity: Yes     Partners: Male     Birth control/protection: Other, Surgical     Comment: Hysterectomy   Other Topics Concern    Not on file   Social History Narrative    Not on file     Social Drivers of Health     Financial Resource Strain: Not on file   Food Insecurity: No Food Insecurity (6/27/2023)    Hunger Vital Sign     Worried About Running Out of Food in the Last Year: Never true     Ran Out of Food in the Last Year: Never true   Transportation Needs: No Transportation Needs (6/27/2023)    PRAPARE - Transportation     Lack of Transportation (Medical): No     Lack of Transportation (Non-Medical): No   Physical Activity: Not on file   Stress: Not on file   Social Connections: Not on file   Intimate Partner Violence: Not on file   Housing Stability: Low Risk  (6/27/2023)    Housing Stability Vital Sign      Unable to Pay for Housing in the Last Year: No     Number of Places Lived in the Last Year: 1     Unstable Housing in the Last Year: No     The following portions of the patient's history were reviewed and updated as appropriate: past family history, past medical history, past social history, past surgical history and problem list.    OBJECTIVE:     Mental Status Evaluation:  Appearance:  dressed appropriately, adequate grooming, looks stated age   Behavior:  pleasant, cooperative, calm, interacts appropriately with this writer   Speech:  normal rate, normal volume, normal pitch   Mood:  slightly more anxious, slightly more depressed   Affect:  mood-congruent   Thought Process:  organized, logical, coherent   Associations: intact associations   Thought Content:  no overt delusions, no paranoia noted on exam   Perceptual Disturbances: no auditory hallucinations, no visual hallucinations   Risk Potential: Suicidal ideation - None  Homicidal ideation - None  Potential for aggression - No   Sensorium:  oriented to person, place, and time/date   Memory:  recent and remote memory grossly intact   Consciousness:  alert and awake   Attention/Concentration: attention span and concentration are age appropriate   Insight:  age appropriate   Judgment: age appropriate   Gait/Station: Unable to assess today due to virtual visit   Motor Activity: unable to assess today due to virtual visit   Video lost part way through visit, audio maintained throughout     Laboratory Results: I have personally reviewed all pertinent laboratory/tests results    Suicide/Homicide Risk Assessment:    Risk of Harm to Self:  The following ratings are based on assessment at the time of the interview  Based on today's assessment, Sweta presents the following risk of harm to self: none    Risk of Harm to Others:  The following ratings are based on assessment at the time of the interview  Based on today's assessment, Sweta presents the following risk  of harm to others: none    The following interventions are recommended: no intervention changes needed     Assessment/Plan:      She has had multiple stressors since last visit so she states she cannot gauge how well the clonidine is working for the anxiety or focus. However, the sleep is improved and she is tolerating it well. For now she wants to hold off on further changes which is reasonable. She will continue to work with Nayely Nuñez LCSW, as well. We have discussed her safety plan and she agrees that if she experience unsafe thoughts that she will reach out to her supports including this office, the suicide hotline, and emergency services if necessary. Sweta is aware of non-emergent and emergent mental health resources. They are able to contract for their own safety at this time.    Will follow up in 1 months. Patient is aware to call the office if questions or concerns arise sooner.      Diagnoses and all orders for this visit:    Major depressive disorder, recurrent, moderate (HCC)    Generalized anxiety disorder    PTSD (post-traumatic stress disorder)          Treatment Recommendations/Precautions:    Continue current medications:     - lamotrigine 100 mg qd    - buspirone 15 mg BID    - clonidine 0.1 mg qhs    *lorazepam and trazodone currently rx by PCP    Does not want any medication changes  Aware of 24 hour and weekend coverage for urgent situations accessed by calling [facility] main practice number  Medication management every 1 month  Continue psychotherapy with [facility] therapist Nayely Nuñez  I am scheduling this patient out for greater than 3 months: No    Medications Risks/Benefits      Risks, Benefits And Possible Side Effects Of Medications:    Risks, benefits, and possible side effects of medications explained to Sweta and she verbalizes understanding and agreement for treatment.    Controlled Medication Discussion:     Not applicable - controlled  prescriptions are not prescribed by this practice    Psychotherapy Provided:     Individual psychotherapy provided: No     Treatment Plan:    Completed and signed during the session: Not applicable - Treatment Plan to be completed by  [facility] therapist     Visit Time    Visit Start Time:  8:30 AM  Visit End Time:  8:50 AM  Total Visit Duration:  20 minutes    Aldo Topete 02/14/25      VIRTUAL VISIT DISCLAIMER    Sweta Campbell verbally agrees to participate in Virtual Care Services. Pt is aware that Virtual Care Services could be limited without vital signs or the ability to perform a full hands-on physical exam. Sweta Campbell understands she or the provider may request at any time to terminate the video visit and request the patient to seek care or treatment in person.

## 2025-02-14 NOTE — ASSESSMENT & PLAN NOTE
Not at goal - continue lamotrigine 100 mg qd, buspirone 15 mg BID, clonidine 0.1 mg qhs; continue with talk therapy; f/u in 1 month

## 2025-02-19 ENCOUNTER — TELEMEDICINE (OUTPATIENT)
Dept: BEHAVIORAL/MENTAL HEALTH CLINIC | Facility: CLINIC | Age: 50
End: 2025-02-19
Payer: COMMERCIAL

## 2025-02-19 DIAGNOSIS — F33.1 MAJOR DEPRESSIVE DISORDER, RECURRENT, MODERATE (HCC): Primary | ICD-10-CM

## 2025-02-19 DIAGNOSIS — F43.10 PTSD (POST-TRAUMATIC STRESS DISORDER): ICD-10-CM

## 2025-02-19 DIAGNOSIS — F41.1 GENERALIZED ANXIETY DISORDER: ICD-10-CM

## 2025-02-19 PROCEDURE — 90834 PSYTX W PT 45 MINUTES: CPT | Performed by: SOCIAL WORKER

## 2025-02-19 NOTE — PSYCH
This note was not shared with the patient due to this is a psychotherapy note    Virtual Regular VisitName: Sweta Campbell      : 1975      MRN: [id number]  Encounter Provider: Nayely Nuñez  Encounter Date: 2025   Encounter department: [facility]  :  Assessment & Plan  Major depressive disorder, recurrent, moderate (HCC)         Generalized anxiety disorder         PTSD (post-traumatic stress disorder)             Goals addressed in session: Goal 1 and Goal 2     Depression Follow-up Plan Completed: Not applicable     Recent Visits  No visits were found meeting these conditions.  Showing recent visits within past 7 days and meeting all other requirements  Today's Visits  Date Type Provider Dept   25 Telemedicine Nayely Nuñez [facility]   Showing today's visits and meeting all other requirements  Future Appointments  No visits were found meeting these conditions.  Showing future appointments within next 150 days and meeting all other requirements     History of Present Illness     Sweta Campbell is a 49 y.o. female  .  HPI    Past Medical History:   Diagnosis Date    Abnormal ECG Years ago    Naz pardum cmp    Allergic     Anxiety     Asthma     Cardiomyopathy (HCC)     bradycardia normal 50-55 bpm    Coronary artery disease     Depression     Disease of thyroid gland     Hyperthyroidism     Early 20s    Interstitial cystitis     Migraine N/a    Through teenager to young adult     Past Surgical History:   Procedure Laterality Date    BARIATRIC SURGERY      x2 surgeries lap band and bypass    BREAST BIOPSY Left     usg bx negative     SECTION      x2    EAR RECONSTRUCTION Left     age 6 skin drum skin graft    HYSTERECTOMY       Current Outpatient Medications   Medication Instructions    albuterol (Proventil HFA) 90 mcg/act inhaler 2 puffs, Inhalation, 4 times daily PRN    azelastine (ASTELIN)  0.1 % nasal spray 1 spray, Nasal, 2 times daily, Use in each nostril as directed    busPIRone (BUSPAR) 15 mg, Oral, 2 times daily    cholecalciferol (VITAMIN D3) 25 mcg (1,000 units) tablet No dose, route, or frequency recorded.    cloNIDine (CATAPRES) 0.1 mg, Oral, Daily at bedtime    fluocinolone acetonide (DERMOTIC) 0.01 % otic oil 5 drops, Both Ears, 2 times daily PRN    fluticasone (FLONASE) 50 mcg/act nasal spray 1 spray, Nasal, Daily    lamoTRIgine (LAMICTAL) 100 mg, Oral, Daily    levothyroxine (EUTHYROX) 175 mcg, Oral, Daily (early morning)    liothyronine (CYTOMEL) 5 mcg, Oral, Daily    lisinopril (ZESTRIL) 5 mg, Oral, Daily    LORazepam (ATIVAN) 1 mg, Oral, Daily PRN    montelukast (SINGULAIR) 10 mg, Oral, Daily    Multiple Vitamins-Minerals (Multi For Her 50+) CAPS Take by mouth    traZODone (DESYREL) 100 mg, Oral, Daily    vitamin B-12 (VITAMIN B-12) 1,000 mcg tablet Daily     No Known Allergies    Review of Systems    Objective   There were no vitals taken for this visit.    Video Exam  Physical Exam     Administrative Statements   Encounter provider Nayely Nuñez    The Patient is located at Home and in the following state in which I hold an active license [place].    The patient was identified by name and date of birth. Sweta Campbell was informed that this is a telemedicine visit and that the visit is being conducted through the Epic Embedded platform. She agrees to proceed..  My office door was closed. No one else was in the room.  She acknowledged consent and understanding of privacy and security of the video platform. The patient has agreed to participate and understands they can discontinue the visit at any time.    I have spent a total time of 43 minutes in caring for this patient on the day of the visit/encounter including  psychotherapy .    Behavioral Health Psychotherapy Progress Note    Psychotherapy Provided: Individual Psychotherapy     1. Major depressive disorder, recurrent, moderate  "(HCC)        2. Generalized anxiety disorder        3. PTSD (post-traumatic stress disorder)            Goals addressed in session: Goal 1 and Goal 2     DATA: Writer met with Sweta for an individual psychotherapy session. Sweta reported that she made the decision to move into her friend's home. Sweta shared that she has been working on packing, and has until April 1st to move. Sweta reported that she did speak to her significant other about the potential of reconciling, but the conversation resulted in confirming, for her, that it is not going to work. Sweta identified feeling sure of her decision. Sweta discussed some concerns about moving in with her friend, especially with the potential of co-dependency; however, Sweta expressed motivation to prevent that from happening and awareness of boundaries. Sweta reported that she has been utilizing boundaries with her sister and some other friends and has been seeing the benefits. Sweta went on to share that she spent time with her son and his girlfriend and felt a difference in her communication with his girlfriend. Sweta reported that she believes that first trimester hormones were likely the cause of the change she was feeling as her son's girlfriend seems \"more herself\" now that she is in the second trimester. Sweta shared some of her thinking regarding her children and her adjustment to this new phase of motherhood. Writer recognized the healthy steps Sweta has been taking and reinforced the positive outcomes. Writer praised Sweta and encouraged her to continue with her effort. Writer actively listened, offered emotional validation and processed thoughts and feelings throughout session.     During this session, this clinician used the following therapeutic modalities: Cognitive Behavioral Therapy and Supportive Psychotherapy    Substance Abuse was not addressed during this session. If the client is diagnosed with a " "co-occurring substance use disorder, please indicate any changes in the frequency or amount of use: NA. Stage of change for addressing substance use diagnoses: No substance use/Not applicable    ASSESSMENT:  Sweta Campbell presents with a Euthymic/ normal mood.     her affect is Normal range and intensity, which is congruent, with her mood and the content of the session. The client has made progress on their goals.    Sweta was oriented X3 and well engaged. Eye contact was good; speech was of normal rate and tone. Thought content was normal; insight and judgement were intact. Sweta Campbell presents with a none risk of suicide, none risk of self-harm, and none risk of harm to others.    For any risk assessment that surpasses a \"low\" rating, a safety plan must be developed.    A safety plan was indicated: no  If yes, describe in detail NA    PLAN: Between sessions, Sweta Campbell will continue with her efforts to move forward utilizing learned coping skills and setting boundaries. At the next session, the therapist will use Cognitive Behavioral Therapy and Supportive Psychotherapy to address stress/depression/anxiety/trauma.    Behavioral Health Treatment Plan and Discharge Planning: Sweta Campbell is aware of and agrees to continue to work on their treatment plan. They have identified and are working toward their discharge goals. yes    Depression Follow-up Plan Completed: Not applicable    Visit start and stop times:    02/19/25  Start Time: 0900  Stop Time: 0943  Total Visit Time: 43 minutes      "

## 2025-02-26 DIAGNOSIS — F41.1 GENERALIZED ANXIETY DISORDER: ICD-10-CM

## 2025-02-26 RX ORDER — BUSPIRONE HYDROCHLORIDE 15 MG/1
15 TABLET ORAL 2 TIMES DAILY
Qty: 60 TABLET | Refills: 0 | Status: SHIPPED | OUTPATIENT
Start: 2025-02-26

## 2025-03-04 ENCOUNTER — TELEPHONE (OUTPATIENT)
Age: 50
End: 2025-03-04

## 2025-03-04 NOTE — TELEPHONE ENCOUNTER
Patient is calling regarding cancelling an appointment.    Date/Time: 3/5 @ 9    Reason: pt    Patient was rescheduled: YES [] NO [x]  If yes, when was Patient reschedule for:     Patient requesting call back to reschedule: YES [] NO [x]

## 2025-03-08 DIAGNOSIS — E03.9 HYPOTHYROIDISM, UNSPECIFIED TYPE: ICD-10-CM

## 2025-03-08 DIAGNOSIS — I10 ESSENTIAL HYPERTENSION: ICD-10-CM

## 2025-03-10 RX ORDER — LISINOPRIL 5 MG/1
5 TABLET ORAL DAILY
Qty: 90 TABLET | Refills: 0 | Status: SHIPPED | OUTPATIENT
Start: 2025-03-10

## 2025-03-10 RX ORDER — LIOTHYRONINE SODIUM 5 UG/1
5 TABLET ORAL DAILY
Qty: 90 TABLET | Refills: 0 | Status: SHIPPED | OUTPATIENT
Start: 2025-03-10

## 2025-03-11 DIAGNOSIS — I10 ESSENTIAL HYPERTENSION: ICD-10-CM

## 2025-03-11 DIAGNOSIS — D50.9 IRON DEFICIENCY ANEMIA, UNSPECIFIED IRON DEFICIENCY ANEMIA TYPE: ICD-10-CM

## 2025-03-11 DIAGNOSIS — E03.9 HYPOTHYROIDISM, UNSPECIFIED TYPE: Primary | ICD-10-CM

## 2025-03-11 DIAGNOSIS — F33.1 MAJOR DEPRESSIVE DISORDER, RECURRENT, MODERATE (HCC): ICD-10-CM

## 2025-03-11 DIAGNOSIS — F41.1 GENERALIZED ANXIETY DISORDER: ICD-10-CM

## 2025-03-11 RX ORDER — LAMOTRIGINE 100 MG/1
100 TABLET ORAL DAILY
Qty: 30 TABLET | Refills: 0 | Status: SHIPPED | OUTPATIENT
Start: 2025-03-11 | End: 2025-03-14

## 2025-03-14 ENCOUNTER — TELEMEDICINE (OUTPATIENT)
Dept: PSYCHIATRY | Facility: CLINIC | Age: 50
End: 2025-03-14
Payer: COMMERCIAL

## 2025-03-14 DIAGNOSIS — F33.1 MAJOR DEPRESSIVE DISORDER, RECURRENT, MODERATE (HCC): Primary | ICD-10-CM

## 2025-03-14 DIAGNOSIS — F41.1 GENERALIZED ANXIETY DISORDER: ICD-10-CM

## 2025-03-14 DIAGNOSIS — F43.10 PTSD (POST-TRAUMATIC STRESS DISORDER): ICD-10-CM

## 2025-03-14 PROCEDURE — 99214 OFFICE O/P EST MOD 30 MIN: CPT | Performed by: PHYSICIAN ASSISTANT

## 2025-03-14 RX ORDER — BUSPIRONE HYDROCHLORIDE 15 MG/1
15 TABLET ORAL 2 TIMES DAILY
Qty: 60 TABLET | Refills: 3 | Status: SHIPPED | OUTPATIENT
Start: 2025-03-14 | End: 2025-07-12

## 2025-03-14 RX ORDER — LAMOTRIGINE 150 MG/1
150 TABLET ORAL DAILY
Qty: 30 TABLET | Refills: 1 | Status: SHIPPED | OUTPATIENT
Start: 2025-03-14 | End: 2025-05-13

## 2025-03-14 RX ORDER — CLONIDINE HYDROCHLORIDE 0.1 MG/1
0.1 TABLET ORAL
Qty: 30 TABLET | Refills: 3 | Status: SHIPPED | OUTPATIENT
Start: 2025-03-14 | End: 2025-07-12

## 2025-03-14 NOTE — PSYCH
MEDICATION MANAGEMENT NOTE    Name: Sweta Campbell      : 1975      MRN: [id number]  Encounter Provider: Aldo Topete  Encounter Date: 3/14/2025   Encounter department: [facility]    Insurance: Payor: [employer] [place] / Plan: [employer] [facility] OMNIA / Product Type: Blue Fee for Service /      Reason for Visit:   Chief Complaint   Patient presents with    Virtual Regular Visit    Follow-up    Medication Management    Depression    Anxiety     [unfilled]  Assessment & Plan  Major depressive disorder, recurrent, moderate (HCC)  Not at goal - increase lamotrigine to 150 mg qd; continue buspirone 15 mg BID; continue talk therapy; f/u in 1-2 months       Generalized anxiety disorder  Not at goal - increase lamotrigine to 150 mg qd; continue buspirone 15 mg BID, clonidine 0.1 mg qhs; continue talk therapy; f/u in 1-2 months       PTSD (post-traumatic stress disorder)  Not at goal - increase lamotrigine to 150 mg qd; continue buspirone 15 mg BID, clonidine 0.1 mg qhs; continue talk therapy; f/u in 1-2 months         Unfortunately she has continued stressors that are making it very hard to function. Her mood has been low and she has been much more anxious, though sleep is okay. Advised increasing lamotrigine to 150 mg qd. No other med changes advised. I would prefer she follow up in 1 month but she reports finances are tight and she might not be able to afford the copay so we can follow up in 2 months but she will update me in between via phone call/MyChart.     Treatment Recommendations:    Continue current medications:     - buspirone 15 mg BID     - clonidine 0.1 mg qhs    Increase medication:    - lamotrigine 100 mg qd to 150 mg qd     *lorazepam and trazodone currently rx by PCP    Educated about diagnosis and treatment modalities. Verbalizes understanding and agreement with the treatment plan.  Discussed self monitoring of symptoms, and symptom  monitoring tools.  Discussed medications and if treatment adjustment was needed or desired.  Medication management every 2 months  Aware of 24 hour and weekend coverage for urgent situations accessed by calling [facility] main practice number  Continue psychotherapy with [facility] therapist Nayely Nuñez  I am scheduling this patient out for greater than 3 months: No    Medications Risks/Benefits:      Risks, Benefits And Possible Side Effects Of Medications:    Risks, benefits, and possible side effects of medications explained to Sweta and she (or legal representative) verbalizes understanding and agreement for treatment.    Controlled Medication Discussion:     Not applicable - controlled prescriptions are not prescribed by this practice      @[email or web address]@  Sweta is seen today for a follow up for Virtual Regular Visit, Follow-up, Medication Management, Depression, and Anxiety and PTSD. Unfortunately since her last visit she has continued to have multiple stressors, particularly as her move-out date comes up (4/1). She has been struggling financially so had to miss her last appt with Nayely Nuñez, [facility], but is hoping to be able to go to the next one. She has been struggling with feeling down but more problematic is the low energy and lack of motivation. She also still feels anxious frequently. She has found that the clonidine plus the trazodone (from PCP) have made it so she gets decent sleep most nights.     Past medication trials:  Paxil, Lexapro, Prozac, Zoloft, Cymbalta, Effexor, Wellbutrin     She denies any suicidal ideation, intent or plan at present; denies any homicidal ideation, intent or plan at present.    She denies any auditory hallucinations, denies any visual hallucinations, denies any delusions.    She denies any side effects from current psychiatric medications.    HPI ROS Appetite Changes and Sleep:     She reports adequate number of sleep hours, adequate  appetite, low energy    Review Of Systems: A review of systems is obtained and is negative except for the pertinent positives listed in HPI/Subjective above.      Current Rating Scores:     None completed today.    Areas of Improvement: reviewed in HPI/Subjective Section and reviewed in Assessment and Plan Section      Past Medical History:   Diagnosis Date    Abnormal ECG Years ago    Naz pardum cmp    Allergic     Anxiety     Asthma     Cardiomyopathy (HCC)     bradycardia normal 50-55 bpm    Coronary artery disease     Depression     Disease of thyroid gland     Hyperthyroidism     Early 20s    Interstitial cystitis     Migraine N/a    Through teenager to young adult        Past Surgical History:   Procedure Laterality Date    BARIATRIC SURGERY      x2 surgeries lap band and bypass    BREAST BIOPSY Left     usg bx negative     SECTION      x2    EAR RECONSTRUCTION Left     age 6 skin drum skin graft    HYSTERECTOMY       Allergies: No Known Allergies    Current Outpatient Medications   Medication Sig Dispense Refill    busPIRone (BUSPAR) 15 mg tablet Take 1 tablet (15 mg total) by mouth 2 (two) times a day 60 tablet 3    cloNIDine (Catapres) 0.1 mg tablet Take 1 tablet (0.1 mg total) by mouth daily at bedtime 30 tablet 3    lamoTRIgine (LaMICtal) 150 MG tablet Take 1 tablet (150 mg total) by mouth daily 30 tablet 1    albuterol (Proventil HFA) 90 mcg/act inhaler Inhale 2 puffs 4 (four) times a day as needed for wheezing 1 g 0    azelastine (ASTELIN) 0.1 % nasal spray 1 spray into each nostril 2 (two) times a day Use in each nostril as directed 1 mL 2    cholecalciferol (VITAMIN D3) 25 mcg (1,000 units) tablet       fluocinolone acetonide (DERMOTIC) 0.01 % otic oil Administer 5 drops into both ears 2 (two) times a day as needed (ear itching) 20 mL 3    fluticasone (FLONASE) 50 mcg/act nasal spray 1 spray into each nostril daily 16 g 0    levothyroxine (Euthyrox) 175 mcg tablet Take 1 tablet (175 mcg  total) by mouth daily in the early morning 90 tablet 1    liothyronine (CYTOMEL) 5 mcg tablet Take 1 tablet (5 mcg total) by mouth daily 90 tablet 0    lisinopril (ZESTRIL) 5 mg tablet Take 1 tablet (5 mg total) by mouth daily 90 tablet 0    LORazepam (ATIVAN) 1 mg tablet Take 1 tablet (1 mg total) by mouth daily as needed for anxiety 30 tablet 0    montelukast (SINGULAIR) 10 mg tablet Take 1 tablet by mouth once daily 90 tablet 0    Multiple Vitamins-Minerals (Multi For Her 50+) CAPS Take by mouth      traZODone (DESYREL) 100 mg tablet Take 1 tablet (100 mg total) by mouth daily 30 tablet 3    vitamin B-12 (VITAMIN B-12) 1,000 mcg tablet Take by mouth daily       No current facility-administered medications for this visit.       Substance Abuse History:    Social History     Substance and Sexual Activity   Alcohol Use Yes    Alcohol/week: 3.0 standard drinks of alcohol    Types: 3 Glasses of wine per week    Comment: social     Social History     Substance and Sexual Activity   Drug Use Yes    Types: Marijuana       Social History:    Social History     Socioeconomic History    Marital status: Legally      Spouse name: Not on file    Number of children: Not on file    Years of education: Not on file    Highest education level: Not on file   Occupational History    Not on file   Tobacco Use    Smoking status: Never     Passive exposure: Never    Smokeless tobacco: Never   Vaping Use    Vaping status: Never Used   Substance and Sexual Activity    Alcohol use: Yes     Alcohol/week: 3.0 standard drinks of alcohol     Types: 3 Glasses of wine per week     Comment: social    Drug use: Yes     Types: Marijuana    Sexual activity: Yes     Partners: Male     Birth control/protection: Other, Surgical     Comment: Hysterectomy   Other Topics Concern    Not on file   Social History Narrative    Not on file     Social Drivers of Health     Financial Resource Strain: Not on file   Food Insecurity: No Food Insecurity  (6/27/2023)    Hunger Vital Sign     Worried About Running Out of Food in the Last Year: Never true     Ran Out of Food in the Last Year: Never true   Transportation Needs: No Transportation Needs (6/27/2023)    PRAPARE - Transportation     Lack of Transportation (Medical): No     Lack of Transportation (Non-Medical): No   Physical Activity: Not on file   Stress: Not on file   Social Connections: Not on file   Intimate Partner Violence: Not on file   Housing Stability: Low Risk  (6/27/2023)    Housing Stability Vital Sign     Unable to Pay for Housing in the Last Year: No     Number of Places Lived in the Last Year: 1     Unstable Housing in the Last Year: No       Family Psychiatric History:     Family History   Problem Relation Age of Onset    Cancer Mother         Cervical    Cervical cancer Mother     Arthritis Mother     Heart attack Mother     Seizures Mother     Asthma Father     Arthritis Father     Heart attack Father     Migraines Sister     Migraines Brother     Breast cancer Cousin         age unknown       Medical History Reviewed by provider this encounter:  Tobacco  Allergies  Meds  Problems  Med Hx  Surg Hx  Fam Hx          Objective   There were no vitals taken for this visit.     Mental Status Evaluation:    Appearance age appropriate, casually dressed, looks stated age   Behavior cooperative, calm   Speech normal rate, normal volume, normal pitch, spontaneous   Mood depressed, anxious   Affect tearful   Thought Processes organized, goal directed   Thought Content no overt delusions   Perceptual Disturbances: no auditory hallucinations, no visual hallucinations   Abnormal Thoughts  Risk Potential Suicidal ideation - None  Homicidal ideation - None  Potential for aggression - No   Orientation oriented to person, place, time/date, and situation   Memory recent and remote memory grossly intact   Consciousness alert and awake   Attention Span Concentration Span attention span and concentration  are age appropriate   Intellect appears to be of average intelligence   Insight intact   Judgement intact   Muscle Strength and  Gait unable to assess today due to virtual visit   Motor activity unable to assess today due to virtual visit   Language no difficulty naming common objects, no difficulty repeating a phrase, no difficulty writing a sentence   Fund of Knowledge adequate knowledge of current events  adequate fund of knowledge regarding past history  adequate fund of knowledge regarding vocabulary    Pain none   Pain Scale 0       Laboratory Results: I have personally reviewed all pertinent laboratory/tests results    Suicide/Homicide Risk Assessment:    Risk of Harm to Self:  The following ratings are based on assessment at the time of the interview  Based on today's assessment, Sweta presents the following risk of harm to self: none    Risk of Harm to Others:  The following ratings are based on assessment at the time of the interview  Based on today's assessment, Sweta presents the following risk of harm to others: none    The following interventions are recommended: Continue medication management. No other intervention changes indicated at this time.    Psychotherapy Provided:     Individual psychotherapy provided: No    Treatment Plan:    Completed and signed during the session: Not applicable - Treatment Plan to be completed by  [facility] therapist    Goals: Progress towards Treatment Plan goals - Yes, progressing, as evidenced by subjective findings in HPI/Subjective Section and in Assessment and Plan Section    Depression Follow-up Plan Completed: Not applicable    Note Share:    This note was not shared with the patient due to reasonable likelihood of causing patient harm    Administrative Statements   Encounter provider Aldo Topete    The Patient is located at Home and in the following state in which I hold an active license [place].    The patient was identified by  name and date of birth. Sweta Campbell was informed that this is a telemedicine visit and that the visit is being conducted through the Epic Embedded platform. She agrees to proceed..  My office door was closed. No one else was in the room.  She acknowledged consent and understanding of privacy and security of the video platform. The patient has agreed to participate and understands they can discontinue the visit at any time.    I have spent a total time of 15 minutes in caring for this patient on the day of the visit/encounter including Prognosis, Risks and benefits of tx options, Instructions for management, Patient and family education, Importance of tx compliance, Impressions, Counseling / Coordination of care, Reviewing/placing orders in the medical record (including tests, medications, and/or procedures), and Obtaining or reviewing history  , not including the time spent for establishing the audio/video connection.    Visit Time  Visit Start Time: 8:30 AM  Visit Stop Time: 8:45 AM  Total Visit Duration:  15 minutes    Aldo Topete 03/14/25

## 2025-03-14 NOTE — ASSESSMENT & PLAN NOTE
Not at goal - increase lamotrigine to 150 mg qd; continue buspirone 15 mg BID, clonidine 0.1 mg qhs; continue talk therapy; f/u in 1-2 months

## 2025-03-14 NOTE — ASSESSMENT & PLAN NOTE
Not at goal - increase lamotrigine to 150 mg qd; continue buspirone 15 mg BID; continue talk therapy; f/u in 1-2 months

## 2025-03-19 ENCOUNTER — SOCIAL WORK (OUTPATIENT)
Dept: BEHAVIORAL/MENTAL HEALTH CLINIC | Facility: CLINIC | Age: 50
End: 2025-03-19
Payer: COMMERCIAL

## 2025-03-19 DIAGNOSIS — F43.10 PTSD (POST-TRAUMATIC STRESS DISORDER): ICD-10-CM

## 2025-03-19 DIAGNOSIS — F41.1 GENERALIZED ANXIETY DISORDER: ICD-10-CM

## 2025-03-19 DIAGNOSIS — F33.1 MAJOR DEPRESSIVE DISORDER, RECURRENT, MODERATE (HCC): Primary | ICD-10-CM

## 2025-03-19 PROCEDURE — 90834 PSYTX W PT 45 MINUTES: CPT | Performed by: SOCIAL WORKER

## 2025-03-19 NOTE — PSYCH
This note was not shared with the patient due to this is a psychotherapy note    Behavioral Health Psychotherapy Progress Note    Psychotherapy Provided: Individual Psychotherapy     1. Major depressive disorder, recurrent, moderate (HCC)        2. Generalized anxiety disorder        3. PTSD (post-traumatic stress disorder)            Goals addressed in session: Goal 1 and Goal 2     DATA: Writer met with Sweta for an individual psychotherapy session. Sweta reported that she has been experiencing a lot of stress related to her upcoming move. Sweta spent time sharing about the various challenges that she has been facing related to this move and how she has been navigating. Sweta reported that she has had to set boundaries with family as they have not been cooperative with getting their belongings out of her house. Sweta recognized that there are things out of her control and she will have to make some hard decisions if necessary. Sweta reported that she was in an argument with her son which was very upsetting for her but they were able to resolve it. Sweta spent time sharing her thoughts and feelings related to this new chapter in her life, some of which strikes up anxiety which she is trying to deal with. Sweta also noted some increased depressive symptoms at times which she is hoping will reduce soon as she started taking an increased dose of one of her medications. Writer actively listened as Sweta shared and spent time processing thoughts and feelings. Writer praised Sweta for the healthy steps she has been taking to manage a very challenging time in her life. Writer reinforced the use of boundaries, healthy coping and self-care. Writer provided emotional validation and supportive feedback throughout session.   During this session, this clinician used the following therapeutic modalities: Cognitive Behavioral Therapy and Supportive Psychotherapy    Substance Abuse was not addressed  "during this session. If the client is diagnosed with a co-occurring substance use disorder, please indicate any changes in the frequency or amount of use: NA. Stage of change for addressing substance use diagnoses: No substance use/Not applicable    ASSESSMENT:  Sweta Campbell presents with a Euthymic/ normal mood.     her affect is Normal range and intensity, which is congruent, with her mood and the content of the session. The client has made progress on their goals.    Sweta was oriented X3 and well engaged. Eye contact was good; speech was of normal rate and tone. Thought content was normal; insight and judgement were intact. Sweta Campbell presents with a none risk of suicide, none risk of self-harm, and none risk of harm to others.    For any risk assessment that surpasses a \"low\" rating, a safety plan must be developed.    A safety plan was indicated: no  If yes, describe in detail NA    PLAN: Between sessions, Sweta Campbell will focus on maintaining boundaries with family while moving. At the next session, the therapist will use Cognitive Behavioral Therapy and Supportive Psychotherapy to address stress/depression/anxiety/trauma.    Behavioral Health Treatment Plan and Discharge Planning: Sweta Campbell is aware of and agrees to continue to work on their treatment plan. They have identified and are working toward their discharge goals. yes    Depression Follow-up Plan Completed: Not applicable    Visit start and stop times:    03/19/25  Start Time: 0900  Stop Time: 0950  Total Visit Time: 50 minutes  "

## 2025-03-20 DIAGNOSIS — F41.9 ANXIETY: ICD-10-CM

## 2025-03-21 RX ORDER — TRAZODONE HYDROCHLORIDE 100 MG/1
100 TABLET ORAL DAILY
Qty: 30 TABLET | Refills: 0 | Status: SHIPPED | OUTPATIENT
Start: 2025-03-21

## 2025-04-01 ENCOUNTER — TELEPHONE (OUTPATIENT)
Age: 50
End: 2025-04-01

## 2025-04-01 NOTE — TELEPHONE ENCOUNTER
Patient is calling regarding cancelling an appointment.    Date/Time: 4/2/25 at 9 am.    Reason: Pt had cancelled this appt at her last visit with Nayely Nuñez.    Patient was rescheduled: YES [] NO [x]  If yes, when was Patient reschedule for: Patient was already scheduled for 4/16/25.    Patient requesting call back to reschedule: YES [] NO [x]

## 2025-04-09 ENCOUNTER — TELEPHONE (OUTPATIENT)
Age: 50
End: 2025-04-09

## 2025-04-09 NOTE — TELEPHONE ENCOUNTER
Patient called in to inquire if there was an appointment open for 4.18.25.    Writer advised there were no other appointments available for April, at this time.    The patient will call back, if they would like to change the appointment to virtual.

## 2025-04-11 ENCOUNTER — TELEPHONE (OUTPATIENT)
Age: 50
End: 2025-04-11

## 2025-04-11 ENCOUNTER — RA CDI HCC (OUTPATIENT)
Dept: OTHER | Facility: HOSPITAL | Age: 50
End: 2025-04-11

## 2025-04-11 NOTE — PROGRESS NOTES
HCC coding opportunities       Chart reviewed, no opportunity found: CHART REVIEWED, NO OPPORTUNITY FOUND        Patients Insurance        Commercial Insurance: [employer]

## 2025-04-11 NOTE — TELEPHONE ENCOUNTER
Pt called in seeking to see if there was availability with her therapist on 4/18/2025. Writer checked the schedule . There was no availability. Pt may call in on the day of her appt to switch to virtual.

## 2025-04-16 LAB
ALBUMIN SERPL-MCNC: 4.3 G/DL (ref 3.8–4.9)
ALP SERPL-CCNC: 94 IU/L (ref 44–121)
ALT SERPL-CCNC: 19 IU/L (ref 0–32)
AST SERPL-CCNC: 24 IU/L (ref 0–40)
BILIRUB SERPL-MCNC: 0.6 MG/DL (ref 0–1.2)
BUN SERPL-MCNC: 10 MG/DL (ref 6–24)
BUN/CREAT SERPL: 11 (ref 9–23)
CALCIUM SERPL-MCNC: 9.7 MG/DL (ref 8.7–10.2)
CHLORIDE SERPL-SCNC: 105 MMOL/L (ref 96–106)
CHOLEST SERPL-MCNC: 142 MG/DL (ref 100–199)
CO2 SERPL-SCNC: 24 MMOL/L (ref 20–29)
CREAT SERPL-MCNC: 0.9 MG/DL (ref 0.57–1)
EGFR: 77 ML/MIN/1.73
ERYTHROCYTE [DISTWIDTH] IN BLOOD BY AUTOMATED COUNT: 12.1 % (ref 11.7–15.4)
GLOBULIN SER-MCNC: 2.2 G/DL (ref 1.5–4.5)
GLUCOSE SERPL-MCNC: 90 MG/DL (ref 70–99)
HCT VFR BLD AUTO: 37 % (ref 34–46.6)
HDLC SERPL-MCNC: 61 MG/DL
HGB BLD-MCNC: 12.1 G/DL (ref 11.1–15.9)
LDLC SERPL CALC-MCNC: 69 MG/DL (ref 0–99)
LDLC/HDLC SERPL: 1.1 RATIO (ref 0–3.2)
MCH RBC QN AUTO: 29.5 PG (ref 26.6–33)
MCHC RBC AUTO-ENTMCNC: 32.7 G/DL (ref 31.5–35.7)
MCV RBC AUTO: 90 FL (ref 79–97)
MICRODELETION SYND BLD/T FISH: NORMAL
PLATELET # BLD AUTO: 203 X10E3/UL (ref 150–450)
POTASSIUM SERPL-SCNC: 4.6 MMOL/L (ref 3.5–5.2)
PROT SERPL-MCNC: 6.5 G/DL (ref 6–8.5)
RBC # BLD AUTO: 4.1 X10E6/UL (ref 3.77–5.28)
SL AMB VLDL CHOLESTEROL CALC: 12 MG/DL (ref 5–40)
SODIUM SERPL-SCNC: 141 MMOL/L (ref 134–144)
T4 FREE SERPL-MCNC: 1.29 NG/DL (ref 0.82–1.77)
TRIGL SERPL-MCNC: 56 MG/DL (ref 0–149)
TSH SERPL DL<=0.005 MIU/L-ACNC: 0.12 UIU/ML (ref 0.45–4.5)
WBC # BLD AUTO: 4.8 X10E3/UL (ref 3.4–10.8)